# Patient Record
Sex: FEMALE | Race: WHITE | NOT HISPANIC OR LATINO | Employment: FULL TIME | URBAN - METROPOLITAN AREA
[De-identification: names, ages, dates, MRNs, and addresses within clinical notes are randomized per-mention and may not be internally consistent; named-entity substitution may affect disease eponyms.]

---

## 2017-01-17 ENCOUNTER — OFFICE VISIT (OUTPATIENT)
Dept: MEDICAL GROUP | Facility: MEDICAL CENTER | Age: 22
End: 2017-01-17
Payer: COMMERCIAL

## 2017-01-17 VITALS
DIASTOLIC BLOOD PRESSURE: 76 MMHG | SYSTOLIC BLOOD PRESSURE: 106 MMHG | OXYGEN SATURATION: 99 % | HEART RATE: 80 BPM | TEMPERATURE: 97.6 F | BODY MASS INDEX: 20.57 KG/M2 | HEIGHT: 66 IN | WEIGHT: 128 LBS

## 2017-01-17 DIAGNOSIS — Z30.09 COUNSELING FOR BIRTH CONTROL REGARDING INTRAUTERINE DEVICE (IUD): ICD-10-CM

## 2017-01-17 DIAGNOSIS — N92.0 MENORRHAGIA WITH REGULAR CYCLE: ICD-10-CM

## 2017-01-17 DIAGNOSIS — M25.562 ACUTE PAIN OF LEFT KNEE: ICD-10-CM

## 2017-01-17 DIAGNOSIS — G43.109 MIGRAINE WITH AURA AND WITHOUT STATUS MIGRAINOSUS, NOT INTRACTABLE: ICD-10-CM

## 2017-01-17 PROCEDURE — 99214 OFFICE O/P EST MOD 30 MIN: CPT | Performed by: NURSE PRACTITIONER

## 2017-01-17 RX ORDER — SUMATRIPTAN 50 MG/1
50 TABLET, FILM COATED ORAL
Qty: 9 TAB | Refills: 3
Start: 2017-01-17 | End: 2017-07-19

## 2017-01-17 NOTE — MR AVS SNAPSHOT
"        Kelvin Malagon   2017 11:20 AM   Office Visit   MRN: [id number]    Department:  [address]   Dept Phone:  [phone]    Description:  Female : 1995   Provider:  LARRY Weiner           Reason for Visit     Medication Problem med refilled at a higher dose and would like to talk about bc      Allergies as of 2017     Allergen Noted Reactions    Bactrim 2008       Egg White [Albumin] 2016       Unknown knows through allergy testing      You were diagnosed with     Migraine with aura and without status migrainosus, not intractable   [052797]       Acute pain of left knee   [6699533]       Menorrhagia with regular cycle   [842599]       Counseling for birth control regarding intrauterine device (IUD)   [3168837]         Vital Signs     Blood Pressure Pulse Temperature Height Weight Body Mass Index    106/76 mmHg 80 36.4 °C (97.6 °F) 1.676 m (5' 5.98\") 58.06 kg (128 lb) 20.67 kg/m2    Oxygen Saturation Last Menstrual Period Breastfeeding? Smoking Status          99% 2016 No Never Smoker         Basic Information     Date Of Birth Sex Race Ethnicity Preferred Language    1995 Female White Non- English      Problem List              ICD-10-CM Priority Class Noted - Resolved    Blood clotting disorder (CMS-HCC) D68.9   10/16/2013 - Present    Lactose intolerance E73.9   2014 - Present    Migraine without status migrainosus, not intractable G43.909   2016 - Present    Food allergy Z91.018   2016 - Present      Health Maintenance        Date Due Completion Dates    IMM HEP B VACCINE (1 of 3 - Primary Series) 1995 ---    IMM HEP A VACCINE (1 of 2 - Standard Series) 10/10/1996 ---    IMM VARICELLA (CHICKENPOX) VACCINE (1 of 2 - 2 Dose Adolescent Series) 10/10/2008 ---    IMM MENINGOCOCCAL VACCINE (MCV4) (1 of 1) 10/10/2011 ---    IMM INFLUENZA (1) 2016 ---    PAP SMEAR 10/10/2016 ---    IMM DTaP/Tdap/Td Vaccine (2 - Td) " 5/27/2018 5/27/2008            Current Immunizations     HPV Quadrivalent Vaccine (GARDASIL) 12/4/2008, 8/1/2008, 5/27/2008    Tdap Vaccine 5/27/2008      Below and/or attached are the medications your provider expects you to take. Review all of your home medications and newly ordered medications with your provider and/or pharmacist. Follow medication instructions as directed by your provider and/or pharmacist. Please keep your medication list with you and share with your provider. Update the information when medications are discontinued, doses are changed, or new medications (including over-the-counter products) are added; and carry medication information at all times in the event of emergency situations     Allergies:  BACTRIM - (reactions not documented)     EGG WHITE - (reactions not documented)               Medications  Valid as of: January 17, 2017 -  2:05 PM    Generic Name Brand Name Tablet Size Instructions for use    SUMAtriptan Succinate (Tab) IMITREX 50 MG Take 1 Tab by mouth Once PRN for Migraine for up to 1 dose.        Topiramate (Tab) TOPAMAX 50 MG Take 1 Tab by mouth 2 times a day.        .                 Medicines prescribed today were sent to:     [facility] [address] [place] [address] [place], [place] - [address] AT [facility]. & [place]    [address] [place] [address]    Phone: [phone] Fax: [phone]    Open 24 Hours?: No      Medication refill instructions:       If your prescription bottle indicates you have medication refills left, it is not necessary to call your provider’s office. Please contact your pharmacy and they will refill your medication.    If your prescription bottle indicates you do not have any refills left, you may request refills at any time through one of the following ways: The online leaselock system (except Urgent Care), by calling your provider’s office, or by asking your pharmacy to contact your provider’s office with a refill request. Medication  refills are processed only during regular business hours and may not be available until the next business day. Your provider may request additional information or to have a follow-up visit with you prior to refilling your medication.   *Please Note: Medication refills are assigned a new Rx number when refilled electronically. Your pharmacy may indicate that no refills were authorized even though a new prescription for the same medication is available at the pharmacy. Please request the medicine by name with the pharmacy before contacting your provider for a refill.        Referral     A referral request has been sent to our patient care coordination department. Please allow 3-5 business days for us to process this request and contact you either by phone or mail. If you do not hear from us by the 5th business day, please call us at [phone].           Ionix Medical Access Code: [email or web address]  Expires: 2/16/2017  2:05 PM    Ionix Medical  A secure, online tool to manage your health information     [employer]’s Ionix Medical® is a secure, online tool that connects you to your personalized health information from the privacy of your home -- day or night - making it very easy for you to manage your healthcare. Once the activation process is completed, you can even access your medical information using the Ionix Medical nga, which is available for free in the Apple Nga store or Google Play store.     Ionix Medical provides the following levels of access (as shown below):   My Chart Features   [facility] Doctor [facility]  Urgent  Care Non-[facility]  Primary Care  Doctor   Email your healthcare team securely and privately 24/7 X X X    Manage appointments: schedule your next appointment; view details of past/upcoming appointments X      Request prescription refills. X      View recent personal medical records, including lab and immunizations X X X X   View health record, including health history, allergies,  medications X X X X   Read reports about your outpatient visits, procedures, consult and ER notes X X X X   See your discharge summary, which is a recap of your hospital and/or ER visit that includes your diagnosis, lab results, and care plan. X X       How to register for StoredIQ:  1. Go to  [email or web address].  2. Click on the Sign Up Now box, which takes you to the New Member Sign Up page. You will need to provide the following information:  a. Enter your StoredIQ Access Code exactly as it appears at the top of this page. (You will not need to use this code after you’ve completed the sign-up process. If you do not sign up before the expiration date, you must request a new code.)   b. Enter your date of birth.   c. Enter your home email address.   d. Click Submit, and follow the next screen’s instructions.  3. Create a StoredIQ ID. This will be your StoredIQ login ID and cannot be changed, so think of one that is secure and easy to remember.  4. Create a StoredIQ password. You can change your password at any time.  5. Enter your Password Reset Question and Answer. This can be used at a later time if you forget your password.   6. Enter your e-mail address. This allows you to receive e-mail notifications when new information is available in StoredIQ.  7. Click Sign Up. You can now view your health information.    For assistance activating your StoredIQ account, call [phone]

## 2017-01-17 NOTE — PROGRESS NOTES
"Chief Complaint   Patient presents with   • Medication Problem     med refilled at a higher dose and would like to talk about bc       HPI  Laquita is a 21-year-old established female here to follow-up on a few things:  #1-migraines: Chronic issue. Doing really well with Topamax twice daily and Imitrex for abortive therapy. She states that she has rare migraines as long as she remembers to take her Imitrex. Denies any negative side effects of Topamax or Imitrex. She received a prescription for 100 mg Imitrex and states that she only needs to take 50 mg.  #2-contraception: She is requesting some form of contraception. She is sexually active. She also has very heavy menstrual periods that last up to 2 weeks and cause abdominal cramping - describes as severely painful. Her mother has a blood clotting disorder, we are not certain of exactly which one. The patient states that she has also tested positive for one form of the blood clotting disorder that her mother has.  #3-describes acute onset left knee pain for the past week. Has a history of meniscal tear to her right knee, followed by Dr. Rodrigez.  Denies any injuries or trauma. States her knee just started popping, appearing swollen and feeling weak. 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 She denies any other associated symptoms. She is wearing a knee brace which helps.      Past medical, surgical, family, and social history is reviewed and updated in Epic chart by me today.   Medications and allergies reviewed and updated in Epic chart by me today.     ROS:   As documented in history of present illness above    Exam:  Blood pressure 106/76, pulse 80, temperature 36.4 °C (97.6 °F), height 1.676 m (5' 5.98\"), weight 58.06 kg (128 lb), last menstrual period 12/24/2016, SpO2 99 %, not currently breastfeeding.  Constitutional: Alert, no distress, plus 3 vital signs  Skin:  Warm, dry, no rashes invisible areas  Eye: Equal, round and reactive, " conjunctiva clear  ENMT: Lips without lesions, good dentition, oropharynx clear    Respiratory: Unlabored respiration, lungs clear to auscultation, no wheezes, no rhonchi  Cardiovascular: Normal rate and rhythm  Musculoskeletal: She does have full range of motion to her left knee although it appears slightly swollen without any bruising. No locking or laxity appreciated. She has some tenderness to her patellar tendon area.  Psych: Alert, pleasant, well-groomed, normal affect    A/P:  1. Migraine with aura and without status migrainosus, not intractable  -Stable with daily Topamax for preventative therapy. Changed her Imitrex milligrams to 50 mg. Continue same.  - sumatriptan (IMITREX) 50 MG Tab; Take 1 Tab by mouth Once PRN for Migraine for up to 1 dose.  Dispense: 9 Tab; Refill: 3    2. Acute pain of left knee  -Discussed rest, ice, elevation and quadriceps as well as hamstring strengthening exercises. She'll continue to wear her brace. Encouraged her to make a appointment with Dr. Rodrigez within the next 2-3 weeks if her knee pains not improving.    3. Menorrhagia with regular cycle  -She was referred to gynecology for possible placement of a copper IUD because of her personal and family history of blood clotting disorder. She will check with her mom regarding exact diagnoses of particular blood clotting disorder.  - REFERRAL TO GYNECOLOGY    4. Counseling for birth control regarding intrauterine device (IUD)  - REFERRAL TO GYNECOLOGY

## 2017-01-24 ENCOUNTER — OFFICE VISIT (OUTPATIENT)
Dept: URGENT CARE | Facility: PHYSICIAN GROUP | Age: 22
End: 2017-01-24
Payer: COMMERCIAL

## 2017-01-24 VITALS
SYSTOLIC BLOOD PRESSURE: 110 MMHG | WEIGHT: 126 LBS | HEIGHT: 66 IN | TEMPERATURE: 97.9 F | DIASTOLIC BLOOD PRESSURE: 64 MMHG | RESPIRATION RATE: 16 BRPM | BODY MASS INDEX: 20.25 KG/M2 | HEART RATE: 80 BPM | OXYGEN SATURATION: 99 %

## 2017-01-24 DIAGNOSIS — M25.562 ACUTE PAIN OF LEFT KNEE: ICD-10-CM

## 2017-01-24 PROCEDURE — 99214 OFFICE O/P EST MOD 30 MIN: CPT | Performed by: PHYSICIAN ASSISTANT

## 2017-01-24 NOTE — MR AVS SNAPSHOT
"        Kelvin Malagon   2017 6:15 PM   Office Visit   MRN: [id number]    Department:  [facility]   Dept Phone:  [phone]    Description:  Female : 1995   Provider:  Alondra Sawyer PA-C           Reason for Visit     Knee Pain L knee pain, feels like its going to dislocate      Allergies as of 2017     Allergen Noted Reactions    Bactrim 2008       Egg White [Albumin] 2016       Unknown knows through allergy testing      Vital Signs     Blood Pressure Pulse Temperature Respirations Height Weight    110/64 mmHg 80 36.6 °C (97.9 °F) 16 1.676 m (5' 5.98\") 57.153 kg (126 lb)    Body Mass Index Oxygen Saturation Last Menstrual Period Smoking Status          20.35 kg/m2 99% 2016 Never Smoker         Basic Information     Date Of Birth Sex Race Ethnicity Preferred Language    1995 Female White Non- English      Problem List              ICD-10-CM Priority Class Noted - Resolved    Blood clotting disorder (CMS-HCC) D68.9   10/16/2013 - Present    Lactose intolerance E73.9   2014 - Present    Migraine without status migrainosus, not intractable G43.909   2016 - Present    Food allergy Z91.018   2016 - Present      Health Maintenance        Date Due Completion Dates    IMM HEP B VACCINE (1 of 3 - Primary Series) 1995 ---    IMM HEP A VACCINE (1 of 2 - Standard Series) 10/10/1996 ---    IMM VARICELLA (CHICKENPOX) VACCINE (1 of 2 - 2 Dose Adolescent Series) 10/10/2008 ---    IMM MENINGOCOCCAL VACCINE (MCV4) (1 of 1) 10/10/2011 ---    IMM INFLUENZA (1) 2016 ---    PAP SMEAR 10/10/2016 ---    IMM DTaP/Tdap/Td Vaccine (2 - Td) 2018            Current Immunizations     HPV Quadrivalent Vaccine (GARDASIL) 2008, 2008, 2008    Tdap Vaccine 2008      Below and/or attached are the medications your provider expects you to take. Review all of your home medications and newly ordered medications with your provider and/or " pharmacist. Follow medication instructions as directed by your provider and/or pharmacist. Please keep your medication list with you and share with your provider. Update the information when medications are discontinued, doses are changed, or new medications (including over-the-counter products) are added; and carry medication information at all times in the event of emergency situations     Allergies:  BACTRIM - (reactions not documented)     EGG WHITE - (reactions not documented)               Medications  Valid as of: January 24, 2017 -  7:00 PM    Generic Name Brand Name Tablet Size Instructions for use    SUMAtriptan Succinate (Tab) IMITREX 50 MG Take 1 Tab by mouth Once PRN for Migraine for up to 1 dose.        Topiramate (Tab) TOPAMAX 50 MG Take 1 Tab by mouth 2 times a day.        .                 Medicines prescribed today were sent to:     [facility] [address] [place] [address] [place], [place] - [address] AT [facility] & [place]    [address] [facility] [address]    Phone: [phone] Fax: [phone]    Open 24 Hours?: No      Medication refill instructions:       If your prescription bottle indicates you have medication refills left, it is not necessary to call your provider’s office. Please contact your pharmacy and they will refill your medication.    If your prescription bottle indicates you do not have any refills left, you may request refills at any time through one of the following ways: The online 5 examples system (except Urgent Care), by calling your provider’s office, or by asking your pharmacy to contact your provider’s office with a refill request. Medication refills are processed only during regular business hours and may not be available until the next business day. Your provider may request additional information or to have a follow-up visit with you prior to refilling your medication.   *Please Note: Medication refills are assigned a new Rx number when refilled electronically.  Your pharmacy may indicate that no refills were authorized even though a new prescription for the same medication is available at the pharmacy. Please request the medicine by name with the pharmacy before contacting your provider for a refill.           Evoz Access Code: [email or web address]  Expires: 2/16/2017  2:05 PM    Evoz  A secure, online tool to manage your health information     [employer]’s Evoz® is a secure, online tool that connects you to your personalized health information from the privacy of your home -- day or night - making it very easy for you to manage your healthcare. Once the activation process is completed, you can even access your medical information using the Evoz nga, which is available for free in the Apple Nga store or Google Play store.     Evoz provides the following levels of access (as shown below):   My Chart Features   [facility] Doctor [facility]  Specialists [facility]  Urgent  Care Non-[facility]  Primary Care  Doctor   Email your healthcare team securely and privately 24/7 X X X    Manage appointments: schedule your next appointment; view details of past/upcoming appointments X      Request prescription refills. X      View recent personal medical records, including lab and immunizations X X X X   View health record, including health history, allergies, medications X X X X   Read reports about your outpatient visits, procedures, consult and ER notes X X X X   See your discharge summary, which is a recap of your hospital and/or ER visit that includes your diagnosis, lab results, and care plan. X X       How to register for Evoz:  1. Go to  [email or web address].  2. Click on the Sign Up Now box, which takes you to the New Member Sign Up page. You will need to provide the following information:  a. Enter your Evoz Access Code exactly as it appears at the top of this page. (You will not need to use this code after you’ve completed the sign-up process. If you  do not sign up before the expiration date, you must request a new code.)   b. Enter your date of birth.   c. Enter your home email address.   d. Click Submit, and follow the next screen’s instructions.  3. Create a BioLeap ID. This will be your BioLeap login ID and cannot be changed, so think of one that is secure and easy to remember.  4. Create a BioLeap password. You can change your password at any time.  5. Enter your Password Reset Question and Answer. This can be used at a later time if you forget your password.   6. Enter your e-mail address. This allows you to receive e-mail notifications when new information is available in BioLeap.  7. Click Sign Up. You can now view your health information.    For assistance activating your BioLeap account, call [phone]

## 2017-01-25 ASSESSMENT — ENCOUNTER SYMPTOMS
MUSCULOSKELETAL NEGATIVE: 1
DIZZINESS: 0
FALLS: 0
NAUSEA: 0
BACK PAIN: 0
CHILLS: 0
FEVER: 0
DIARRHEA: 0
SHORTNESS OF BREATH: 0
VOMITING: 0
HEADACHES: 0
ABDOMINAL PAIN: 0
MYALGIAS: 0
NUMBNESS: 0

## 2017-01-25 NOTE — PROGRESS NOTES
"Subjective:      Kelvin Malagon is a 21 y.o. female who presents with Knee Pain            Knee Pain  The current episode started 1 to 4 weeks ago (10 days). The problem has been unchanged. Pertinent negatives include no abdominal pain, chest pain, chills, fever, headaches, myalgias, nausea, numbness or vomiting. The symptoms are aggravated by walking. She has tried NSAIDs for the symptoms. The treatment provided mild relief.   Pt reports knee pain that started about 10 days. Pt believes it is due to walking on all the snow on the ground. States she does not know when she injured the knee, but noticed it gradually getting more painful throughout the day. Since she first noticed the pain, she has started wearing a knee brace, which helps with the pain. She states it the knee feels unstable like it is going to give out if she doesn't wear the brace. States the pain is mostly localized to the medial and anterior aspect of the joint. Denies previous injuries or surgeries. Reports the pain at 6/10. She has been icing, elevating, and taking nsaids since the pain started, without much improvement. States she is already established with ortho for surgery she had on her right knee, and has an appointment with them next week.     Review of Systems   Constitutional: Negative for fever and chills.   Respiratory: Negative for shortness of breath.    Cardiovascular: Negative for chest pain.   Gastrointestinal: Negative for nausea, vomiting, abdominal pain and diarrhea.   Genitourinary: Negative.    Musculoskeletal: Negative.  Negative for myalgias, back pain, joint pain and falls.   Neurological: Negative for dizziness, numbness and headaches.          Objective:     /64 mmHg  Pulse 80  Temp(Src) 36.6 °C (97.9 °F)  Resp 16  Ht 1.676 m (5' 5.98\")  Wt 57.153 kg (126 lb)  BMI 20.35 kg/m2  SpO2 99%  LMP 12/24/2016     Physical Exam   Constitutional: She is oriented to person, place, and time. She appears well-developed " and well-nourished. No distress.   HENT:   Head: Normocephalic and atraumatic.   Eyes: Pupils are equal, round, and reactive to light.   Neck: Normal range of motion.   Cardiovascular: Normal rate, regular rhythm and normal heart sounds.    Pulmonary/Chest: Effort normal and breath sounds normal. No respiratory distress. She has no wheezes.   Musculoskeletal:        Left knee: She exhibits decreased range of motion and MCL laxity. She exhibits no swelling, no effusion, no ecchymosis, no erythema and no bony tenderness. Tenderness found. Medial joint line and MCL tenderness noted.   Unable to flex knee past 90 degrees.      Neurological: She is alert and oriented to person, place, and time.   Skin: Skin is warm and dry. She is not diaphoretic.   Psychiatric: She has a normal mood and affect. Her behavior is normal.   Nursing note and vitals reviewed.         PMH:  has a past medical history of Allergy; ASTHMA; and Clotting disorder (CMS-[facility]). She also has no past medical history of Diabetes.  MEDS:   Current outpatient prescriptions:   •  sumatriptan (IMITREX) 50 MG Tab, Take 1 Tab by mouth Once PRN for Migraine for up to 1 dose., Disp: 9 Tab, Rfl: 3  •  topiramate (TOPAMAX) 50 MG tablet, Take 1 Tab by mouth 2 times a day., Disp: 60 Tab, Rfl: 5  ALLERGIES:   Allergies   Allergen Reactions   • Bactrim    • Egg White [Albumin]      Unknown knows through allergy testing     SURGHX:   Past Surgical History   Procedure Laterality Date   • Knee arthroscopy  1/8/2009     Performed by SHAD CORDOBA at SURGERY [facility]   • Synovectomy  1/8/2009     Performed by SHAD CORDOBA at SURGERY [facility]     SOCHX:  reports that she has never smoked. She has never used smokeless tobacco. She reports that she does not drink alcohol or use illicit drugs.  FH: family history includes Non-contributory in her father and mother.       Assessment/Plan:     1. Acute pain of left knee  - Advised that an X-ray is not  indicated at this times  - Encouraged to continue RICE therapy  - Continue OTC Ibuprofen 400-600 mg q4-6 hours as needed for pain   - Take with food  - Follow up with ortho next week, advised she will most likely need an MRI to determine the cause of her symptoms

## 2017-05-28 ENCOUNTER — HOSPITAL ENCOUNTER (EMERGENCY)
Facility: MEDICAL CENTER | Age: 22
End: 2017-05-29
Attending: EMERGENCY MEDICINE
Payer: COMMERCIAL

## 2017-05-28 DIAGNOSIS — G43.011 INTRACTABLE MIGRAINE WITHOUT AURA AND WITH STATUS MIGRAINOSUS: ICD-10-CM

## 2017-05-28 DIAGNOSIS — R11.2 NAUSEA AND VOMITING, INTRACTABILITY OF VOMITING NOT SPECIFIED, UNSPECIFIED VOMITING TYPE: ICD-10-CM

## 2017-05-28 PROCEDURE — 94760 N-INVAS EAR/PLS OXIMETRY 1: CPT

## 2017-05-28 PROCEDURE — 99284 EMERGENCY DEPT VISIT MOD MDM: CPT

## 2017-05-28 RX ORDER — KETOROLAC TROMETHAMINE 30 MG/ML
30 INJECTION, SOLUTION INTRAMUSCULAR; INTRAVENOUS ONCE
Status: COMPLETED | OUTPATIENT
Start: 2017-05-29 | End: 2017-05-29

## 2017-05-28 RX ORDER — SODIUM CHLORIDE 9 MG/ML
1000 INJECTION, SOLUTION INTRAVENOUS ONCE
Status: COMPLETED | OUTPATIENT
Start: 2017-05-29 | End: 2017-05-29

## 2017-05-28 RX ORDER — DIPHENHYDRAMINE HYDROCHLORIDE 50 MG/ML
50 INJECTION INTRAMUSCULAR; INTRAVENOUS ONCE
Status: COMPLETED | OUTPATIENT
Start: 2017-05-29 | End: 2017-05-29

## 2017-05-28 RX ORDER — SODIUM CHLORIDE 9 MG/ML
INJECTION, SOLUTION INTRAVENOUS CONTINUOUS
Status: DISCONTINUED | OUTPATIENT
Start: 2017-05-29 | End: 2017-05-29 | Stop reason: HOSPADM

## 2017-05-28 RX ORDER — METOCLOPRAMIDE HYDROCHLORIDE 5 MG/ML
10 INJECTION INTRAMUSCULAR; INTRAVENOUS ONCE
Status: COMPLETED | OUTPATIENT
Start: 2017-05-29 | End: 2017-05-29

## 2017-05-28 ASSESSMENT — PAIN SCALES - GENERAL
PAINLEVEL_OUTOF10: 8
PAINLEVEL_OUTOF10: 6

## 2017-05-28 NOTE — ED AVS SNAPSHOT
Home Care Instructions                                                                                                                Kelvin Malagon   MRN: [id number]    Department:  [facility], Emergency Dept   Date of Visit:  5/28/2017            [facility], Emergency Dept    [address]    [place] [address]    Phone:  [phone]      You were seen by     Estefania Trevino D.O.      Your Diagnosis Was     Intractable migraine without aura and with status migrainosus     G43.011       These are the medications you received during your hospitalization from 05/28/2017 2244 to 05/29/2017 0219     Date/Time Order Dose Route Action    05/29/2017 0008 NS infusion 1,000 mL 1,000 mL Intravenous New Bag    05/29/2017 0118 NS infusion 1,000 mL Intravenous New Bag    05/29/2017 0017 ketorolac (TORADOL) injection 30 mg 30 mg Intravenous Given    05/29/2017 0020 metoclopramide (REGLAN) injection 10 mg 10 mg Intravenous Given    05/29/2017 0010 diphenhydrAMINE (BENADRYL) injection 50 mg 50 mg Intravenous Given    05/29/2017 0138 ketorolac (TORADOL) injection 30 mg 30 mg Intravenous Given      Follow-up Information     1. Follow up with LARRY Weiner. Schedule an appointment as soon as possible for a visit in 2 days.    Specialty:  Family Medicine    Why:  As needed, If symptoms worsen    Contact information    [address] [address]  [place] [address] [phone]        Medication Information     Review all of your home medications and newly ordered medications with your primary doctor and/or pharmacist as soon as possible. Follow medication instructions as directed by your doctor and/or pharmacist.     Please keep your complete medication list with you and share with your physician. Update the information when medications are discontinued, doses are changed, or new medications (including over-the-counter products) are added; and carry medication  information at all times in the event of emergency situations.               Medication List      START taking these medications        Instructions    Morning Afternoon Evening Bedtime    ondansetron 4 MG Tbdp   Commonly known as:  ZOFRAN ODT        Take 1 Tab by mouth every 8 hours as needed for Nausea/Vomiting.   Dose:  4 mg                          ASK your doctor about these medications        Instructions    Morning Afternoon Evening Bedtime    sumatriptan 50 MG Tabs   Commonly known as:  IMITREX        Take 1 Tab by mouth Once PRN for Migraine for up to 1 dose.   Dose:  50 mg                        topiramate 50 MG tablet   Commonly known as:  TOPAMAX        Take 1 Tab by mouth 2 times a day.   Dose:  50 mg                             Where to Get Your Medications      These medications were sent to [facility] [address]  [place] [address] [employer] [address] [facility] & [address]  [address] [place] [address]     Phone:  [phone]    - ondansetron 4 MG Tbdp            Procedures and tests performed during your visit     Cardiac Monitoring    IV Saline Lock    Pulse Ox        Discharge Instructions       Migraine Headache  A migraine headache is an intense, throbbing pain on one or both sides of your head. A migraine can last for 30 minutes to several hours.  CAUSES   The exact cause of a migraine headache is not always known. However, a migraine may be caused when nerves in the brain become irritated and release chemicals that cause inflammation. This causes pain.  Certain things may also trigger migraines, such as:  · Alcohol.  · Smoking.  · Stress.  · Menstruation.  · Aged cheeses.  · Foods or drinks that contain nitrates, glutamate, aspartame, or tyramine.  · Lack of sleep.  · Chocolate.  · Caffeine.  · Hunger.  · Physical exertion.  · Fatigue.  · Medicines used to treat chest pain (nitroglycerine), birth control pills, estrogen, and some blood pressure medicines.  SIGNS AND  SYMPTOMS  · Pain on one or both sides of your head.  · Pulsating or throbbing pain.  · Severe pain that prevents daily activities.  · Pain that is aggravated by any physical activity.  · Nausea, vomiting, or both.  · Dizziness.  · Pain with exposure to bright lights, loud noises, or activity.  · General sensitivity to bright lights, loud noises, or smells.  Before you get a migraine, you may get warning signs that a migraine is coming (aura). An aura may include:  · Seeing flashing lights.  · Seeing bright spots, halos, or zigzag lines.  · Having tunnel vision or blurred vision.  · Having feelings of numbness or tingling.  · Having trouble talking.  · Having muscle weakness.  DIAGNOSIS   A migraine headache is often diagnosed based on:  · Symptoms.  · Physical exam.  · A CT scan or MRI of your head. These imaging tests cannot diagnose migraines, but they can help rule out other causes of headaches.  TREATMENT  Medicines may be given for pain and nausea. Medicines can also be given to help prevent recurrent migraines.   HOME CARE INSTRUCTIONS  · Only take over-the-counter or prescription medicines for pain or discomfort as directed by your health care provider. The use of long-term narcotics is not recommended.  · Lie down in a dark, quiet room when you have a migraine.  · Keep a journal to find out what may trigger your migraine headaches. For example, write down:  ¨ What you eat and drink.  ¨ How much sleep you get.  ¨ Any change to your diet or medicines.  · Limit alcohol consumption.  · Quit smoking if you smoke.  · Get 7-9 hours of sleep, or as recommended by your health care provider.  · Limit stress.  · Keep lights dim if bright lights bother you and make your migraines worse.  SEEK IMMEDIATE MEDICAL CARE IF:   · Your migraine becomes severe.  · You have a fever.  · You have a stiff neck.  · You have vision loss.  · You have muscular weakness or loss of muscle control.  · You start losing your balance or have  trouble walking.  · You feel faint or pass out.  · You have severe symptoms that are different from your first symptoms.  MAKE SURE YOU:   · Understand these instructions.  · Will watch your condition.  · Will get help right away if you are not doing well or get worse.     This information is not intended to replace advice given to you by your health care provider. Make sure you discuss any questions you have with your health care provider.     Document Released: 12/18/2006 Document Revised: 01/08/2016 Document Reviewed: 08/25/2014  "[employer]" Interactive Patient Education ©2016 "[employer]" Inc.      Increase clear liquids for the next 24 hours  Take her current medications as directed and return if any problems or changes.          Patient Information     Patient Information    Following emergency treatment: all patient requiring follow-up care must return either to a private physician or a clinic if your condition worsens before you are able to obtain further medical attention, please return to the emergency room.     Billing Information    At [facility], we work to make the billing process streamlined for our patients.  Our Representatives are here to answer any questions you may have regarding your hospital bill.  If you have insurance coverage and have supplied your insurance information to us, we will submit a claim to your insurer on your behalf.  Should you have any questions regarding your bill, we can be reached online or by phone as follows:  Online: You are able pay your bills online or live chat with our representatives about any billing questions you may have. We are here to help Monday - Friday from 8:00am to 7:30pm and 9:00am - 12:00pm on Saturdays.  Please visit [email or web address]  for more information.   Phone:  [phone] or [phone]    Please note that your emergency physician, surgeon, pathologist, radiologist, anesthesiologist, and other specialists are  not employed by [facility] and will therefore bill separately for their services.  Please contact them directly for any questions concerning their bills at the numbers below:     Emergency Physician Services:  [phone]  [facility]:  [phone]  [facility]:  [phone]  [facility]:  [phone]    1. Your final bill may vary from the amount quoted upon discharge if all procedures are not complete at that time, or if your doctor has additional procedures of which we are not aware. You will receive an additional bill if you return to the Emergency Department at [facility] for suture removal regardless of the facility of which the sutures were placed.     2. Please arrange for settlement of this account at the emergency registration.    3. All self-pay accounts are due in full at the time of treatment.  If you are unable to meet this obligation then payment is expected within 4-5 days.     4. If you have had radiology studies (CT, X-ray, Ultrasound, MRI), you have received a preliminary result during your emergency department visit. Please contact the radiology department [phone] to receive a copy of your final result. Please discuss the Final result with your primary physician or with the follow up physician provided.     Crisis Hotline:  [place] Crisis Hotline:  6-202-RYCYGZR or 1-604.665.6608  [place] Crisis Hotline:    1-385.520.2512 or 977-532-5677         ED Discharge Follow Up Questions    1. In order to provide you with very good care, we would like to follow up with a phone call in the next few days.  May we have your permission to contact you?     YES /  NO    2. What is the best phone number to call you? (       )_____-__________    3. What is the best time to call you?      Morning  /  Afternoon  /  Evening                   Patient Signature:  ____________________________________________________________    Date:   ____________________________________________________________

## 2017-05-28 NOTE — ED AVS SNAPSHOT
Topix Access Code: [email or web address]  Expires: 6/28/2017  2:14 AM    Your email address is not on file at [facility].  Email Addresses are required for you to sign up for Topix, please contact [phone] to verify your personal information and to provide your email address prior to attempting to register for Topix.    Kelvin Malagon  [address] [place] [address]    Topix  A secure, online tool to manage your health information     [facility]’s Topix® is a secure, online tool that connects you to your personalized health information from the privacy of your home -- day or night - making it very easy for you to manage your healthcare. Once the activation process is completed, you can even access your medical information using the Topix nga, which is available for free in the Apple Nga store or Google Play store.     To learn more about Topix, visit [email or web address]    There are two levels of access available (as shown below):   My Chart Features  [facility] Primary Care Doctor [facility]  Specialists [facility]  Urgent  Care Non-[facility] Primary Care Doctor   Email your healthcare team securely and privately 24/7 X X X    Manage appointments: schedule your next appointment; view details of past/upcoming appointments X      Request prescription refills. X      View recent personal medical records, including lab and immunizations X X X X   View health record, including health history, allergies, medications X X X X   Read reports about your outpatient visits, procedures, consult and ER notes X X X X   See your discharge summary, which is a recap of your hospital and/or ER visit that includes your diagnosis, lab results, and care plan X X  X     How to register for CollabRxt:  Once your e-mail address has been verified, follow the following steps to sign up for Topix.     1. Go to  [email or web address]  2. Click on the Sign Up Now box, which takes you to the New Member Sign Up page. You will  need to provide the following information:  a. Enter your Onlineprinters Access Code exactly as it appears at the top of this page. (You will not need to use this code after you’ve completed the sign-up process. If you do not sign up before the expiration date, you must request a new code.)   b. Enter your date of birth.   c. Enter your home email address.   d. Click Submit, and follow the next screen’s instructions.  3. Create a Silicor Materialst ID. This will be your Onlineprinters login ID and cannot be changed, so think of one that is secure and easy to remember.  4. Create a Onlineprinters password. You can change your password at any time.  5. Enter your Password Reset Question and Answer. This can be used at a later time if you forget your password.   6. Enter your e-mail address. This allows you to receive e-mail notifications when new information is available in Onlineprinters.  7. Click Sign Up. You can now view your health information.    For assistance activating your Onlineprinters account, call [phone]

## 2017-05-28 NOTE — ED AVS SNAPSHOT
5/29/2017    Kelvin Malagon  [address] [facility] [address]    Dear Kelvin:    Critical access hospital wants to ensure your discharge home is safe and you or your loved ones have had all of your questions answered regarding your care after you leave the hospital.    Below is a list of resources and contact information should you have any questions regarding your hospital stay, follow-up instructions, or active medical symptoms.    Questions or Concerns Regarding… Contact   Medical Questions Related to Your Discharge  (7 days a week, 8am-5pm) Contact a Nurse Care Coordinator   [phone]   Medical Questions Not Related to Your Discharge  (24 hours a day / 7 days a week)  Contact the Nurse Health Line   [phone]    Medications or Discharge Instructions Refer to your discharge packet   or contact your [facility] Primary Care Provider   [phone]   Follow-up Appointment(s) Schedule your appointment via the grafter   or contact Scheduling [phone]   Billing Review your statement via the grafter  or contact Billing [phone]   Medical Records Review your records via the grafter   or contact Medical Records [phone]     You may receive a telephone call within two days of discharge. This call is to make certain you understand your discharge instructions and have the opportunity to have any questions answered. You can also easily access your medical information, test results and upcoming appointments via the the grafter free online health management tool. You can learn more and sign up at Discoverables/the grafter. For assistance setting up your the grafter account, please call [phone].    Once again, we want to ensure your discharge home is safe and that you have a clear understanding of any next steps in your care. If you have any questions or concerns, please do not hesitate to contact us, we are here for you. Thank you for choosing [facility] for your healthcare needs.    Sincerely,    Your [facility] Healthcare Team

## 2017-05-29 VITALS
WEIGHT: 134.7 LBS | DIASTOLIC BLOOD PRESSURE: 59 MMHG | TEMPERATURE: 99.1 F | OXYGEN SATURATION: 98 % | SYSTOLIC BLOOD PRESSURE: 112 MMHG | HEART RATE: 69 BPM | RESPIRATION RATE: 27 BRPM | BODY MASS INDEX: 21.65 KG/M2 | HEIGHT: 66 IN

## 2017-05-29 PROCEDURE — 96374 THER/PROPH/DIAG INJ IV PUSH: CPT

## 2017-05-29 PROCEDURE — 700105 HCHG RX REV CODE 258: Performed by: EMERGENCY MEDICINE

## 2017-05-29 PROCEDURE — 700111 HCHG RX REV CODE 636 W/ 250 OVERRIDE (IP): Performed by: EMERGENCY MEDICINE

## 2017-05-29 PROCEDURE — 96375 TX/PRO/DX INJ NEW DRUG ADDON: CPT

## 2017-05-29 PROCEDURE — 96361 HYDRATE IV INFUSION ADD-ON: CPT

## 2017-05-29 PROCEDURE — 96376 TX/PRO/DX INJ SAME DRUG ADON: CPT

## 2017-05-29 RX ORDER — ONDANSETRON 4 MG/1
4 TABLET, ORALLY DISINTEGRATING ORAL EVERY 8 HOURS PRN
Qty: 10 TAB | Refills: 0 | Status: SHIPPED | OUTPATIENT
Start: 2017-05-29 | End: 2018-05-26

## 2017-05-29 RX ORDER — KETOROLAC TROMETHAMINE 30 MG/ML
30 INJECTION, SOLUTION INTRAMUSCULAR; INTRAVENOUS ONCE
Status: COMPLETED | OUTPATIENT
Start: 2017-05-29 | End: 2017-05-29

## 2017-05-29 RX ADMIN — METOCLOPRAMIDE 10 MG: 5 INJECTION, SOLUTION INTRAMUSCULAR; INTRAVENOUS at 00:20

## 2017-05-29 RX ADMIN — SODIUM CHLORIDE 1000 ML: 9 INJECTION, SOLUTION INTRAVENOUS at 00:08

## 2017-05-29 RX ADMIN — KETOROLAC TROMETHAMINE 30 MG: 30 INJECTION, SOLUTION INTRAMUSCULAR at 01:38

## 2017-05-29 RX ADMIN — KETOROLAC TROMETHAMINE 30 MG: 30 INJECTION, SOLUTION INTRAMUSCULAR at 00:17

## 2017-05-29 RX ADMIN — DIPHENHYDRAMINE HYDROCHLORIDE 50 MG: 50 INJECTION, SOLUTION INTRAMUSCULAR; INTRAVENOUS at 00:10

## 2017-05-29 RX ADMIN — SODIUM CHLORIDE 1000 ML: 9 INJECTION, SOLUTION INTRAVENOUS at 01:18

## 2017-05-29 ASSESSMENT — PAIN SCALES - GENERAL: PAINLEVEL_OUTOF10: 1

## 2017-05-29 NOTE — ED NOTES
Pt bib family with c/o of migraine that started at 1930 Saturday. Pt reports taking her medication with no relief.

## 2017-05-29 NOTE — DISCHARGE INSTRUCTIONS
Migraine Headache  A migraine headache is an intense, throbbing pain on one or both sides of your head. A migraine can last for 30 minutes to several hours.  CAUSES   The exact cause of a migraine headache is not always known. However, a migraine may be caused when nerves in the brain become irritated and release chemicals that cause inflammation. This causes pain.  Certain things may also trigger migraines, such as:  · Alcohol.  · Smoking.  · Stress.  · Menstruation.  · Aged cheeses.  · Foods or drinks that contain nitrates, glutamate, aspartame, or tyramine.  · Lack of sleep.  · Chocolate.  · Caffeine.  · Hunger.  · Physical exertion.  · Fatigue.  · Medicines used to treat chest pain (nitroglycerine), birth control pills, estrogen, and some blood pressure medicines.  SIGNS AND SYMPTOMS  · Pain on one or both sides of your head.  · Pulsating or throbbing pain.  · Severe pain that prevents daily activities.  · Pain that is aggravated by any physical activity.  · Nausea, vomiting, or both.  · Dizziness.  · Pain with exposure to bright lights, loud noises, or activity.  · General sensitivity to bright lights, loud noises, or smells.  Before you get a migraine, you may get warning signs that a migraine is coming (aura). An aura may include:  · Seeing flashing lights.  · Seeing bright spots, halos, or zigzag lines.  · Having tunnel vision or blurred vision.  · Having feelings of numbness or tingling.  · Having trouble talking.  · Having muscle weakness.  DIAGNOSIS   A migraine headache is often diagnosed based on:  · Symptoms.  · Physical exam.  · A CT scan or MRI of your head. These imaging tests cannot diagnose migraines, but they can help rule out other causes of headaches.  TREATMENT  Medicines may be given for pain and nausea. Medicines can also be given to help prevent recurrent migraines.   HOME CARE INSTRUCTIONS  · Only take over-the-counter or prescription medicines for pain or discomfort as directed by your  health care provider. The use of long-term narcotics is not recommended.  · Lie down in a dark, quiet room when you have a migraine.  · Keep a journal to find out what may trigger your migraine headaches. For example, write down:  ¨ What you eat and drink.  ¨ How much sleep you get.  ¨ Any change to your diet or medicines.  · Limit alcohol consumption.  · Quit smoking if you smoke.  · Get 7-9 hours of sleep, or as recommended by your health care provider.  · Limit stress.  · Keep lights dim if bright lights bother you and make your migraines worse.  SEEK IMMEDIATE MEDICAL CARE IF:   · Your migraine becomes severe.  · You have a fever.  · You have a stiff neck.  · You have vision loss.  · You have muscular weakness or loss of muscle control.  · You start losing your balance or have trouble walking.  · You feel faint or pass out.  · You have severe symptoms that are different from your first symptoms.  MAKE SURE YOU:   · Understand these instructions.  · Will watch your condition.  · Will get help right away if you are not doing well or get worse.     This information is not intended to replace advice given to you by your health care provider. Make sure you discuss any questions you have with your health care provider.     Document Released: 12/18/2006 Document Revised: 01/08/2016 Document Reviewed: 08/25/2014  SwitchNote Interactive Patient Education ©2016 [employer].      Increase clear liquids for the next 24 hours  Take her current medications as directed and return if any problems or changes.

## 2017-05-29 NOTE — ED PROVIDER NOTES
ED Provider Note    CHIEF COMPLAINT  Chief Complaint   Patient presents with   • Migraine       HPI  Kelvin Malagon is a 21 y.o. female who presents tonight with her mother with a chief complaint of migraine headache that she has had for over 27 hours. She has tried taking her home medications of Topamax and Imitrex without success. She is now nauseated and vomiting. She denies any fever, chills, head trauma. She states this is typical of her migraines and just has lasted a lot longer. She states she normally can abort the headaches with her medications but it's not working this time. She denies any blurred or double vision.    REVIEW OF SYSTEMS  See HPI for further details. All other system reviews are negative.    PAST MEDICAL HISTORY  Past Medical History   Diagnosis Date   • Allergy    • ASTHMA      child -cifuentes.  controlled without meds since 6 yrs old   • Clotting disorder ([facility])      patient unsure of the specifics       FAMILY HISTORY  Family History   Problem Relation Age of Onset   • Non-contributory Mother    • Non-contributory Father        SOCIAL HISTORY  Social History     Social History   • Marital Status: Single     Spouse Name: N/A   • Number of Children: N/A   • Years of Education: N/A     Social History Main Topics   • Smoking status: Never Smoker    • Smokeless tobacco: Never Used   • Alcohol Use: No   • Drug Use: No   • Sexual Activity: No     Other Topics Concern   • None     Social History Narrative       SURGICAL HISTORY  Past Surgical History   Procedure Laterality Date   • Knee arthroscopy  1/8/2009     Performed by SHAD CORDOBA at SUR[facility]ERY [facility]   • Synovectomy  1/8/2009     Performed by SHAD CORDOBA at SURGERY [facility]       CURRENT MEDICATIONS  See nurses notes    ALLERGIES  Allergies   Allergen Reactions   • Bactrim    • Egg White [Albumin]      Unknown knows through allergy testing       PHYSICAL EXAM  VITAL SIGNS: /59 mmHg  Pulse 69  Temp(Src)  "37.3 °C (99.1 °F)  Resp 27  Ht 1.676 m (5' 6\")  Wt 61.1 kg (134 lb 11.2 oz)  BMI 21.75 kg/m2  SpO2 98%    Constitutional: Patient is well developed, well nourished in moderate distress from her headache.  HENT: Normocephalic, Oropharynx moist without erythema, nose normal with no drainage.   Eyes: PERRL, EOMI, Conjunctiva without erythema.   Neck: Supple with no anterior cervical adenopathy. Normal range of motion in flexion, extension and lateral rotation. No tenderness along the bony prominences or paraspinal muscles.  Lymphatic: No lymphadenopathy noted.   Cardiovascular: Normal heart rate and rhythm. No murmur.  Thorax & Lungs: Clear and equal breath sounds with good excursion. No respiratory distress, no wheezing.  Abdomen: Bowel sounds normal in all four quadrants. Soft,nontender, no rebound , guarding.  Skin: Pale warm, Dry, No rashes.   Back: No cervical, thoracic, or lumbosacral tenderness.   Extremities: Peripheral pulses 4/4 No edema, No tenderness  Musculoskeletal: Normal range of motion in all major joints. No tenderness to palpation.  Neurologic: Alert & oriented x 3, Normal motor function, Normal sensory function, No lateralizing or focal deficits noted. DTR's 4/4 bilaterally.  Psychiatric: Affect normal, Judgment normal, Mood normal.    COURSE & MEDICAL DECISION MAKING  Pertinent Labs & Imaging studies reviewed. (See chart for details)  Patient received an IV of normal saline with Toradol, Reglan, Benadryl and was about 80% improved. I gave her a redo dose of Toradol and she is feeling remarkably better. She states her pain level was down to a 1 out of 10. Plan will be to give her a prescription for Zofran oral dissolving tablets. She is to continue her current medications and follow up with her primary care doctor this week as needed. She can return if elevated fever, uncontrolled vomiting or worsening pain. She is stable upon discharge    FINAL IMPRESSION  1. Acute migraine cephalgia  2. " Nausea with vomiting  3.         Electronically signed by: Estefania Trevino, 5/29/2017 2:08 AM

## 2017-05-29 NOTE — ED NOTES
Pt given discharge instructions; verbalized understanding of instructions. Pt advised that a prescription was sent to her pharmacy.  Ambulated out with mother

## 2017-06-07 ENCOUNTER — OFFICE VISIT (OUTPATIENT)
Dept: MEDICAL GROUP | Facility: LAB | Age: 22
End: 2017-06-07
Payer: COMMERCIAL

## 2017-06-07 VITALS
RESPIRATION RATE: 12 BRPM | WEIGHT: 135 LBS | BODY MASS INDEX: 21.69 KG/M2 | TEMPERATURE: 98.5 F | HEIGHT: 66 IN | OXYGEN SATURATION: 98 % | HEART RATE: 82 BPM | DIASTOLIC BLOOD PRESSURE: 70 MMHG | SYSTOLIC BLOOD PRESSURE: 100 MMHG

## 2017-06-07 DIAGNOSIS — G43.109 MIGRAINE WITH AURA AND WITHOUT STATUS MIGRAINOSUS, NOT INTRACTABLE: ICD-10-CM

## 2017-06-07 DIAGNOSIS — Z91.09 ENVIRONMENTAL ALLERGIES: ICD-10-CM

## 2017-06-07 PROCEDURE — 99214 OFFICE O/P EST MOD 30 MIN: CPT | Performed by: NURSE PRACTITIONER

## 2017-06-07 RX ORDER — PROPRANOLOL HYDROCHLORIDE 20 MG/1
20 TABLET ORAL 2 TIMES DAILY
Qty: 60 TAB | Refills: 5 | Status: SHIPPED | OUTPATIENT
Start: 2017-06-07 | End: 2018-05-26

## 2017-06-07 NOTE — PROGRESS NOTES
"Chief Complaint   Patient presents with   • Migraine     F/V on migraine, pt states meds are not longer helping her, she had to go to ER  on 5/28, with acute migraine        HPI  Laquita is a 22 yo est female here with return of migraines, chronic issue to the since she was a child.  Started with increasing frequency, duration and severity of headaches 1.5 mo ago.  No new stressors - work / school same.  Sleeping well.  No falls or trauma.  Menses are monthly. Taking topiramate 50 mg bid which was previously working well to prevent migraines.  imitrex has stopped helping abort her migraines.  Had to go to ER 5/28 b/c of 28 hour migraine - went away with meds in ER and sent home with zofran which helps.  Generalized HA 4-5 x per week and migraine at least once weekly only on right side of head with nausea and photo/ phonophobia.    Environmental allergies: Chronic issue. Not currently well controlled. Requesting a prescription of Claritin D12 hour which has been helpful for her but she can only get a certain quantity at her pharmacy. Describes her allergy symptoms as congestion, runny nose, sneezing and itchy eyes. Occasional coughing. She does feel that her allergies worsen her migraines.    Past medical, surgical, family, and social history is reviewed and updated in Epic chart by me today.   Medications and allergies reviewed and updated in Epic chart by me today.     ROS:   As documented in history of present illness above    Exam:  Blood pressure 100/70, pulse 82, temperature 36.9 °C (98.5 °F), resp. rate 12, height 1.676 m (5' 5.98\"), weight 61.236 kg (135 lb), SpO2 98 %.  Constitutional: Alert, no distress, plus 3 vital signs  Skin:  Warm, dry, no rashes invisible areas  Eye: Equal, round and reactive, conjunctiva clear  ENMT: Lips without lesions, good dentition, oropharynx clear    Neck: Trachea midline, no masses, no thyromegaly  Respiratory: Unlabored respiration, lungs clear to auscultation, no wheezes, no " rhonchi  Cardiovascular: Normal rate and rhythm  Psych: Alert, pleasant, well-groomed, normal affect    A/P:  1. Migraine with aura and without status migrainosus, not intractable  -if imitrex does not work again, change to relpax or maxalt.   -Add on propanolol 20 mg twice daily. Continue Topamax. Increase water intake, sleep at night and exercise. Discussed certain migraine triggers with the patient. Follow-up 6 weeks with headache diary and discussed that we may potentially be able to reduce her propanolol, eventually getting rid of it with better control of her allergies.   - propranolol (INDERAL) 20 MG Tab; Take 1 Tab by mouth 2 times a day.  Dispense: 60 Tab; Refill: 5    2. Environmental allergies  - loratadine/pseudo SR (CLARITIN D) 5-120 MG TABLET SR 12 HR; Take 1 Tab by mouth 2 times a day.  Dispense: 60 Tab; Refill: 5    Side effects of all medications prescribed today were discussed with the patient including how to take the medications and proper dosage. Discussed repercussions of not taking the medications as prescribed. Instructed to call the office should she have any negative side effects or problems with the medications.

## 2017-06-07 NOTE — MR AVS SNAPSHOT
"        Kelvin Malagon   2017 9:20 AM   Office Visit   MRN: [id number]    Department:  [facility]   Dept Phone:  [phone]    Description:  Female : 1995   Provider:  LARRY Weiner           Reason for Visit     Migraine F/V on migraine, pt states meds are not longer helping her, she had to go to ER  on , with acute migraine       Allergies as of 2017     Allergen Noted Reactions    Bactrim 2008       Egg White [Albumin] 2016       Unknown knows through allergy testing      You were diagnosed with     Migraine with aura and without status migrainosus, not intractable   [324844]       Environmental allergies   [006683]         Vital Signs     Blood Pressure Pulse Temperature Respirations Height Weight    100/70 mmHg 82 36.9 °C (98.5 °F) 12 1.676 m (5' 5.98\") 61.236 kg (135 lb)    Body Mass Index Oxygen Saturation Smoking Status             21.80 kg/m2 98% Never Smoker          Basic Information     Date Of Birth Sex Race Ethnicity Preferred Language    1995 Female White Non- English      Your appointments     2017  9:00 AM   Established Patient with LARRY Weiner   [facility] (--)    [address] [place] [id number] [phone]           You will be receiving a confirmation call a few days before your appointment from our automated call confirmation system.              Problem List              ICD-10-CM Priority Class Noted - Resolved    Blood clotting disorder (CMS-HCC) D68.9   10/16/2013 - Present    Lactose intolerance E73.9   2014 - Present    Migraine without status migrainosus, not intractable G43.909   2016 - Present    Food allergy Z91.018   2016 - Present      Health Maintenance        Date Due Completion Dates    IMM HEP B VACCINE (1 of 3 - Primary Series) 1995 ---    IMM HEP A VACCINE (1 of 2 - Standard Series) 10/10/1996 ---    IMM VARICELLA " (CHICKENPOX) VACCINE (1 of 2 - 2 Dose Adolescent Series) 10/10/2008 ---    IMM MENINGOCOCCAL VACCINE (MCV4) (1 of 1) 10/10/2011 ---    PAP SMEAR 10/10/2016 ---    IMM DTaP/Tdap/Td Vaccine (2 - Td) 5/27/2018 5/27/2008            Current Immunizations     HPV Quadrivalent Vaccine (GARDASIL) 12/4/2008, 8/1/2008, 5/27/2008    Tdap Vaccine 5/27/2008      Below and/or attached are the medications your provider expects you to take. Review all of your home medications and newly ordered medications with your provider and/or pharmacist. Follow medication instructions as directed by your provider and/or pharmacist. Please keep your medication list with you and share with your provider. Update the information when medications are discontinued, doses are changed, or new medications (including over-the-counter products) are added; and carry medication information at all times in the event of emergency situations     Allergies:  BACTRIM - (reactions not documented)     EGG WHITE - (reactions not documented)               Medications  Valid as of: June 07, 2017 -  9:51 AM    Generic Name Brand Name Tablet Size Instructions for use    Loratadine-Pseudoephedrine (TABLET SR 12 HR) CLARITIN D 5-120 MG Take 1 Tab by mouth 2 times a day.        Ondansetron (TABLET DISPERSIBLE) ZOFRAN ODT 4 MG Take 1 Tab by mouth every 8 hours as needed for Nausea/Vomiting.        Propranolol HCl (Tab) INDERAL 20 MG Take 1 Tab by mouth 2 times a day.        SUMAtriptan Succinate (Tab) IMITREX 50 MG Take 1 Tab by mouth Once PRN for Migraine for up to 1 dose.        Topiramate (Tab) TOPAMAX 50 MG Take 1 Tab by mouth 2 times a day.        .                 Medicines prescribed today were sent to:     [id number] - [place] - [address] AT [facility] [address]    [address] [address]    Phone: [phone] Fax: [phone]    Open 24 Hours?: No    [id number] - [place] - [address] [facility]     [address] [facility] [address]    Phone: [phone] Fax: [phone]    Open 24 Hours?: No      Medication refill instructions:       If your prescription bottle indicates you have medication refills left, it is not necessary to call your provider’s office. Please contact your pharmacy and they will refill your medication.    If your prescription bottle indicates you do not have any refills left, you may request refills at any time through one of the following ways: The online [facility] [facility] [employer] system (except Urgent Care), by calling your provider’s office, or by asking your pharmacy to contact your provider’s office with a refill request. Medication refills are processed only during regular business hours and may not be available until the next business day. Your provider may request additional information or to have a follow-up visit with you prior to refilling your medication.   *Please Note: Medication refills are assigned a new Rx number when refilled electronically. Your pharmacy may indicate that no refills were authorized even though a new prescription for the same medication is available at the pharmacy. Please request the medicine by name with the pharmacy before contacting your provider for a refill.           [facility] [facility] [employer] Access Code: Activation code not generated  Current [facility] [facility] [employer] Status: Active

## 2017-06-18 ENCOUNTER — OFFICE VISIT (OUTPATIENT)
Dept: URGENT CARE | Facility: PHYSICIAN GROUP | Age: 22
End: 2017-06-18
Payer: COMMERCIAL

## 2017-06-18 ENCOUNTER — HOSPITAL ENCOUNTER (OUTPATIENT)
Dept: RADIOLOGY | Facility: MEDICAL CENTER | Age: 22
End: 2017-06-18
Attending: FAMILY MEDICINE
Payer: COMMERCIAL

## 2017-06-18 VITALS
RESPIRATION RATE: 14 BRPM | SYSTOLIC BLOOD PRESSURE: 118 MMHG | WEIGHT: 130 LBS | TEMPERATURE: 98.8 F | HEART RATE: 62 BPM | DIASTOLIC BLOOD PRESSURE: 82 MMHG | BODY MASS INDEX: 20.99 KG/M2 | OXYGEN SATURATION: 99 %

## 2017-06-18 DIAGNOSIS — M79.671 RIGHT FOOT PAIN: ICD-10-CM

## 2017-06-18 DIAGNOSIS — S90.454A: Primary | ICD-10-CM

## 2017-06-18 PROCEDURE — 73660 X-RAY EXAM OF TOE(S): CPT | Mod: RT

## 2017-06-18 PROCEDURE — 90715 TDAP VACCINE 7 YRS/> IM: CPT | Performed by: FAMILY MEDICINE

## 2017-06-18 PROCEDURE — 90471 IMMUNIZATION ADMIN: CPT | Performed by: FAMILY MEDICINE

## 2017-06-18 PROCEDURE — 10121 INC&RMVL FB SUBQ TISS COMP: CPT | Performed by: FAMILY MEDICINE

## 2017-06-18 PROCEDURE — L9900 O&P SUPPLY/ACCESSORY/SERVICE: HCPCS | Performed by: FAMILY MEDICINE

## 2017-06-18 RX ORDER — HYDROCODONE BITARTRATE AND ACETAMINOPHEN 7.5; 325 MG/1; MG/1
1 TABLET ORAL EVERY 8 HOURS PRN
Qty: 20 TAB | Refills: 0 | Status: SHIPPED | OUTPATIENT
Start: 2017-06-18 | End: 2018-05-26

## 2017-06-18 RX ORDER — CIPROFLOXACIN 500 MG/1
500 TABLET, FILM COATED ORAL 2 TIMES DAILY
Qty: 10 TAB | Refills: 0 | Status: SHIPPED | OUTPATIENT
Start: 2017-06-18 | End: 2017-06-23

## 2017-06-18 RX ORDER — IBUPROFEN 800 MG/1
800 TABLET ORAL EVERY 8 HOURS PRN
Qty: 20 TAB | Refills: 3 | Status: SHIPPED | OUTPATIENT
Start: 2017-06-18 | End: 2018-05-26

## 2017-06-18 ASSESSMENT — ENCOUNTER SYMPTOMS
DIZZINESS: 0
FEVER: 0
FOCAL WEAKNESS: 0
CHILLS: 0

## 2017-06-18 NOTE — PROGRESS NOTES
Subjective:      Kelvin Malagon is a 21 y.o. female who presents with Foot Pain    Chief Complaint   Patient presents with   • Foot Pain     kicked a shrub last night, right foot pain        - This is a very pleasant 21 y.o. female with complaints of Rt big toe wound/pain. Unsure last td.           ALLERGIES:  Bactrim and Egg white     PMH:  Past Medical History   Diagnosis Date   • Allergy    • ASTHMA      child -cifuentes.  controlled without meds since 6 yrs old   • Clotting disorder (CMS-HCC)      patient unsure of the specifics        MEDS:    Current outpatient prescriptions:   •  ciprofloxacin (CIPRO) 500 MG Tab, Take 1 Tab by mouth 2 times a day for 5 days., Disp: 10 Tab, Rfl: 0  •  ibuprofen (MOTRIN) 800 MG Tab, Take 1 Tab by mouth every 8 hours as needed., Disp: 20 Tab, Rfl: 3  •  hydrocodone-acetaminophen (NORCO) 7.5-325 MG per tablet, Take 1 Tab by mouth every 8 hours as needed., Disp: 20 Tab, Rfl: 0  •  loratadine/pseudo SR (CLARITIN D) 5-120 MG TABLET SR 12 HR, Take 1 Tab by mouth 2 times a day., Disp: 60 Tab, Rfl: 5  •  propranolol (INDERAL) 20 MG Tab, Take 1 Tab by mouth 2 times a day., Disp: 60 Tab, Rfl: 5  •  ondansetron (ZOFRAN ODT) 4 MG TABLET DISPERSIBLE, Take 1 Tab by mouth every 8 hours as needed for Nausea/Vomiting., Disp: 10 Tab, Rfl: 0  •  sumatriptan (IMITREX) 50 MG Tab, Take 1 Tab by mouth Once PRN for Migraine for up to 1 dose., Disp: 9 Tab, Rfl: 3  •  topiramate (TOPAMAX) 50 MG tablet, Take 1 Tab by mouth 2 times a day., Disp: 60 Tab, Rfl: 5    ** I have documented what I find to be significant in regards to past medical, social, family and surgical history  in my HPI or under PMH/PSH/FH review section, otherwise it is contributory **             HPI    Review of Systems   Constitutional: Negative for fever and chills.   Neurological: Negative for dizziness and focal weakness.          Objective:     /82 mmHg  Pulse 62  Temp(Src) 37.1 °C (98.8 °F)  Resp 14  Wt 58.968 kg (130 lb)   SpO2 99%     Physical Exam   Constitutional: She appears well-developed. No distress.   HENT:   Head: Normocephalic and atraumatic.   Eyes: Conjunctivae are normal.   Neck: Neck supple.   Cardiovascular: Regular rhythm.    No murmur heard.  Pulmonary/Chest: Effort normal. No respiratory distress.   Neurological: She is alert. She exhibits normal muscle tone.   Skin: Skin is warm and dry.   Psychiatric: She has a normal mood and affect. Judgment normal.   Nursing note and vitals reviewed.  Rt foot: some superficial abrasions over dorsal foot and great toe. Small PW great toe w/ ?FB. Full srom ankle/foot/toe.             Assessment/Plan:         1. Foreign body of skin of toe, right, initial encounter  ciprofloxacin (CIPRO) 500 MG Tab    ibuprofen (MOTRIN) 800 MG Tab    hydrocodone-acetaminophen (NORCO) 7.5-325 MG per tablet   2. Right foot pain  DX-TOE(S) 2+ RIGHT    TDAP VACCINE =>6YO IM     Xray: no acute findings by MY READ. RADIOLOGY READ PENDING.     * Rt big toe: 2cc lido w/o used for digital block, 0.3cc lido w/ used over PW site. #11 scalpel used to make 0.7cm incision through dermis down to sub-q to get better access to FB. FB removed w/ splinter forceps.     Wound cleaned/dressed  Post-op shoe  Rest/elevate        Dx & d/c instructions discussed w/ patient and/or family members. Follow up w/ Prvt Dr or here in 2 days if not getting better, sooner if needed,  ER if worse and UC/PCP unavailable.        Possible side effects (i.e. Rash, GI upset/constipation, sedation, elevation of BP or sugars) of any medications given discussed.

## 2017-06-18 NOTE — MR AVS SNAPSHOT
Kelvin Malagon   2017 9:15 AM   Office Visit   MRN: [id number]    Department:  [facility]   Dept Phone:  [phone]    Description:  Female : 1995   Provider:  Juan Carlos Irizarry M.D.           Reason for Visit     Foot Pain kicked a shrub last night, right foot pain      Allergies as of 2017     Allergen Noted Reactions    Bactrim 2008       Egg White [Albumin] 2016       Unknown knows through allergy testing      You were diagnosed with     Foreign body of skin of toe, right, initial encounter   [3312111]  -  Primary     Right foot pain   [[id number]]         Vital Signs     Blood Pressure Pulse Temperature Respirations Weight Oxygen Saturation    118/82 mmHg 62 37.1 °C (98.8 °F) 14 58.968 kg (130 lb) 99%    Smoking Status                   Never Smoker            Basic Information     Date Of Birth Sex Race Ethnicity Preferred Language    1995 Female White Non- English      Your appointments     2017  9:00 AM   Established Patient with LARRY Weiner   [facility] (--)    [address] [address] [id number] [phone]           You will be receiving a confirmation call a few days before your appointment from our automated call confirmation system.              Problem List              ICD-10-CM Priority Class Noted - Resolved    Blood clotting disorder (CMS-HCC) D68.9   10/16/2013 - Present    Lactose intolerance E73.9   2014 - Present    Migraine without status migrainosus, not intractable G43.909   2016 - Present    Food allergy Z91.018   2016 - Present      Health Maintenance        Date Due Completion Dates    IMM HEP B VACCINE (1 of 3 - Primary Series) 1995 ---    IMM HEP A VACCINE (1 of 2 - Standard Series) 10/10/1996 ---    IMM VARICELLA (CHICKENPOX) VACCINE (1 of 2 - 2 Dose Adolescent Series) 10/10/2008 ---    IMM MENINGOCOCCAL VACCINE (MCV4) (1 of 1) 10/10/2011 ---       PAP SMEAR 10/10/2016 ---    IMM DTaP/Tdap/Td Vaccine (2 - Td) 5/27/2018 5/27/2008            Current Immunizations     HPV Quadrivalent Vaccine (GARDASIL) 12/4/2008, 8/1/2008, 5/27/2008    Tdap Vaccine 6/18/2017, 5/27/2008      Below and/or attached are the medications your provider expects you to take. Review all of your home medications and newly ordered medications with your provider and/or pharmacist. Follow medication instructions as directed by your provider and/or pharmacist. Please keep your medication list with you and share with your provider. Update the information when medications are discontinued, doses are changed, or new medications (including over-the-counter products) are added; and carry medication information at all times in the event of emergency situations     Allergies:  BACTRIM - (reactions not documented)     EGG WHITE - (reactions not documented)               Medications  Valid as of: June 18, 2017 - 11:12 AM    Generic Name Brand Name Tablet Size Instructions for use    Ciprofloxacin HCl (Tab) CIPRO 500 MG Take 1 Tab by mouth 2 times a day for 5 days.        Hydrocodone-Acetaminophen (Tab) NORCO 7.5-325 MG Take 1 Tab by mouth every 8 hours as needed.        Ibuprofen (Tab) MOTRIN 800 MG Take 1 Tab by mouth every 8 hours as needed.        Loratadine-Pseudoephedrine (TABLET SR 12 HR) CLARITIN D 5-120 MG Take 1 Tab by mouth 2 times a day.        Mupirocin (Ointment) BACTROBAN 2 % Apply 1 Application to affected area(s) 2 times a day.        Ondansetron (TABLET DISPERSIBLE) ZOFRAN ODT 4 MG Take 1 Tab by mouth every 8 hours as needed for Nausea/Vomiting.        Propranolol HCl (Tab) INDERAL 20 MG Take 1 Tab by mouth 2 times a day.        SUMAtriptan Succinate (Tab) IMITREX 50 MG Take 1 Tab by mouth Once PRN for Migraine for up to 1 dose.        Topiramate (Tab) TOPAMAX 50 MG Take 1 Tab by mouth 2 times a day.        .                 Medicines prescribed today were sent to:     [id number] - [place] - [address] AT [facility] OF [address]    [address] [place] [address]    Phone: [phone] Fax: [phone]    Open 24 Hours?: No    [id number] - [place] - [address] [facility]    [address] [facility] [place] [address]    Phone: [phone] Fax: [phone]    Open 24 Hours?: No      Medication refill instructions:       If your prescription bottle indicates you have medication refills left, it is not necessary to call your provider’s office. Please contact your pharmacy and they will refill your medication.    If your prescription bottle indicates you do not have any refills left, you may request refills at any time through one of the following ways: The online [facility] system (except Urgent Care), by calling your provider’s office, or by asking your pharmacy to contact your provider’s office with a refill request. Medication refills are processed only during regular business hours and may not be available until the next business day. Your provider may request additional information or to have a follow-up visit with you prior to refilling your medication.   *Please Note: Medication refills are assigned a new Rx number when refilled electronically. Your pharmacy may indicate that no refills were authorized even though a new prescription for the same medication is available at the pharmacy. Please request the medicine by name with the pharmacy before contacting your provider for a refill.        Your To Do List     Future Labs/Procedures Complete By Expires    DX-TOE(S) 2+ RIGHT  As directed 6/18/2018         [facility] Access Code: Activation code not generated  Current [facility] Status: Active

## 2017-06-18 NOTE — Clinical Note
June 18, 2017         Patient: Kelvin Malagon   YOB: 1995   Date of Visit: 6/18/2017           To Whom it May Concern:    Kelvin Malagon was seen in my clinic on 6/18/2017. She may return to work today with use of post-op shoe due to toe injury. May use post-op shoe up to 1 week.     If you have any questions or concerns, please don't hesitate to call.        Sincerely,           Juan Carlos Irizarry M.D.  Electronically Signed

## 2017-07-19 ENCOUNTER — OFFICE VISIT (OUTPATIENT)
Dept: MEDICAL GROUP | Facility: LAB | Age: 22
End: 2017-07-19
Payer: COMMERCIAL

## 2017-07-19 VITALS
SYSTOLIC BLOOD PRESSURE: 92 MMHG | BODY MASS INDEX: 21.98 KG/M2 | TEMPERATURE: 98.8 F | RESPIRATION RATE: 12 BRPM | HEIGHT: 66 IN | WEIGHT: 136.8 LBS | DIASTOLIC BLOOD PRESSURE: 60 MMHG

## 2017-07-19 DIAGNOSIS — G43.109 MIGRAINE WITH AURA AND WITHOUT STATUS MIGRAINOSUS, NOT INTRACTABLE: ICD-10-CM

## 2017-07-19 PROCEDURE — 99213 OFFICE O/P EST LOW 20 MIN: CPT | Performed by: NURSE PRACTITIONER

## 2017-07-19 RX ORDER — ELETRIPTAN HYDROBROMIDE 20 MG/1
20 TABLET, FILM COATED ORAL
Qty: 10 TAB | Refills: 3 | Status: SHIPPED
Start: 2017-07-19 | End: 2018-01-28 | Stop reason: SDUPTHER

## 2017-07-19 NOTE — PROGRESS NOTES
"Chief Complaint   Patient presents with   • Migraine     F/V on migraines, pt states meds work some of the time        HPI\  Laquita is a 21-year-old established female here to follow-up on migraines:  #1-Migraine without status migrainosus, not intractable  Here to f/u on chronic migraines.  Has had migraines since 7 yrs old.  At our last visit propanolol was added on as preventative therapy but she feels has made improvement in terms of decreasing the frequency of her severe migraines. Denies any negative side effects of propanolol with the exception of occasional dizziness when she goes from sitting to standing. Has been charting migraine frequency - 6/18 she had a migraine that she describes as sharp knives in-between eyes / back of head (imitrex helped), 6/23/2017 (imitrex helped).  Went camping the last week of June and had a moderate headache every other day - only utilized motrin.  7/6/2017 - (imitrex helped after a few hours).  Reports mild headaches over the past week but not a migraine.   Taking propranolol 20 mg twice daily and Topamax 50 mg bid as preventative therapy.  Has not seen neurology since she was a child - interested in this.       Past medical, surgical, family, and social history is reviewed and updated in Epic chart by me today.   Medications and allergies reviewed and updated in Epic chart by me today.     ROS:   As documented in history of present illness above    Exam:  Blood pressure 92/60, temperature 37.1 °C (98.8 °F), resp. rate 12, height 1.676 m (5' 5.98\"), weight 62.052 kg (136 lb 12.8 oz).  Gen. appears healthy in no distress   Skin appropriate for sex and age   HEENT unremarkable   Lungs clear   Heart regular   Neuro gait and station normal   Psych appropriate      A/P:  1. Migraine with aura and without status migrainosus, not intractable  -Trial of changing her Imitrex to Relpax as it does appear that her abortive therapy is taking a bit longer. Refer to neurology but discussed " that this may take several months to be seen there. She'll continue propanolol and Topamax as she is tolerating these well. Discussed importance of her water intake, exercise and avoiding migraine triggers.  - REFERRAL TO NEUROLOGY  - eletriptan (RELPAX) 20 MG Tab; Take 1 Tab by mouth Once PRN for Migraine for up to 1 dose.  Dispense: 10 Tab; Refill: 3

## 2017-07-19 NOTE — ASSESSMENT & PLAN NOTE
Here to f/u on chronic migraines.  Has had migraines since 7 yrs old.  Has been charting migraine frequency - 6/18 in-between eyes / back of head (imitrex helped), 6/23/2017 (imitrex helped).  Went camping the last week of June and had a moderate headache every other day - only utilized motrin.  7/6/2017 - (imitrex helped after a few hours).  Reports mild headaches over the past week but not a migraine.    Taking propranolol 20 mg twice daily and Topamax 50 mg bid as preventative therapy.  Occasional associated dizziness.

## 2017-07-19 NOTE — MR AVS SNAPSHOT
"        Kelvin Malagon   2017 9:00 AM   Office Visit   MRN: [id number]    Department:  [facility]   Dept Phone:  [phone]    Description:  Female : 1995   Provider:  LARRY Weiner           Reason for Visit     Migraine F/V on migraines, pt states meds work some of the time       Allergies as of 2017     Allergen Noted Reactions    Bactrim 2008       Egg White [Albumin] 2016       Unknown knows through allergy testing      You were diagnosed with     Migraine with aura and without status migrainosus, not intractable   [[id number]]         Vital Signs     Blood Pressure Temperature Respirations Height Weight Body Mass Index    92/60 mmHg 37.1 °C (98.8 °F) 12 1.676 m (5' 5.98\") 62.052 kg (136 lb 12.8 oz) 22.09 kg/m2    Smoking Status                   Never Smoker            Basic Information     Date Of Birth Sex Race Ethnicity Preferred Language    1995 Female White Non- English      Problem List              ICD-10-CM Priority Class Noted - Resolved    Blood clotting disorder (CMS-HCC) D68.9   10/16/2013 - Present    Lactose intolerance E73.9   2014 - Present    Migraine without status migrainosus, not intractable G43.909   2016 - Present    Food allergy Z91.018   2016 - Present      Health Maintenance        Date Due Completion Dates    IMM HEP B VACCINE (1 of 3 - Primary Series) 1995 ---    IMM HEP A VACCINE (1 of 2 - Standard Series) 10/10/1996 ---    IMM VARICELLA (CHICKENPOX) VACCINE (1 of 2 - 2 Dose Adolescent Series) 10/10/2008 ---    IMM MENINGOCOCCAL VACCINE (MCV4) (1 of 1) 10/10/2011 ---    PAP SMEAR 10/10/2016 ---    IMM INFLUENZA (1) 2017 ---    IMM DTaP/Tdap/Td Vaccine (3 - Td) 2027, 2008            Current Immunizations     HPV Quadrivalent Vaccine (GARDASIL) 2008, 2008, 2008    Tdap Vaccine 2017, 2008      Below and/or attached are the medications your provider expects " you to take. Review all of your home medications and newly ordered medications with your provider and/or pharmacist. Follow medication instructions as directed by your provider and/or pharmacist. Please keep your medication list with you and share with your provider. Update the information when medications are discontinued, doses are changed, or new medications (including over-the-counter products) are added; and carry medication information at all times in the event of emergency situations     Allergies:  BACTRIM - (reactions not documented)     EGG WHITE - (reactions not documented)               Medications  Valid as of: July 19, 2017 -  9:26 AM    Generic Name Brand Name Tablet Size Instructions for use    Eletriptan Hydrobromide (Tab) RELPAX 20 MG Take 1 Tab by mouth Once PRN for Migraine for up to 1 dose.        Hydrocodone-Acetaminophen (Tab) NORCO 7.5-325 MG Take 1 Tab by mouth every 8 hours as needed.        Ibuprofen (Tab) MOTRIN 800 MG Take 1 Tab by mouth every 8 hours as needed.        Loratadine-Pseudoephedrine (TABLET SR 12 HR) CLARITIN D 5-120 MG Take 1 Tab by mouth 2 times a day.        Mupirocin (Ointment) BACTROBAN 2 % Apply 1 Application to affected area(s) 2 times a day.        Ondansetron (TABLET DISPERSIBLE) ZOFRAN ODT 4 MG Take 1 Tab by mouth every 8 hours as needed for Nausea/Vomiting.        Propranolol HCl (Tab) INDERAL 20 MG Take 1 Tab by mouth 2 times a day.        Topiramate (Tab) TOPAMAX 50 MG Take 1 Tab by mouth 2 times a day.        .                 Medicines prescribed today were sent to:     [facility] [address] [facility] [place] - [address] AT [facility] [address] [facility] [facility]    [address] [facility] [address]    Phone: [phone] Fax: [phone]    Open 24 Hours?: No    [id number] - [place], [place] - [address]    [address] [facility] [address]    Phone: [phone] Fax: [phone]    Open 24 Hours?: No      Medication refill  instructions:       If your prescription bottle indicates you have medication refills left, it is not necessary to call your provider’s office. Please contact your pharmacy and they will refill your medication.    If your prescription bottle indicates you do not have any refills left, you may request refills at any time through one of the following ways: The online echoecho system (except Urgent Care), by calling your provider’s office, or by asking your pharmacy to contact your provider’s office with a refill request. Medication refills are processed only during regular business hours and may not be available until the next business day. Your provider may request additional information or to have a follow-up visit with you prior to refilling your medication.   *Please Note: Medication refills are assigned a new Rx number when refilled electronically. Your pharmacy may indicate that no refills were authorized even though a new prescription for the same medication is available at the pharmacy. Please request the medicine by name with the pharmacy before contacting your provider for a refill.        Referral     A referral request has been sent to our patient care coordination department. Please allow 3-5 business days for us to process this request and contact you either by phone or mail. If you do not hear from us by the 5th business day, please call us at [phone].           echoecho Access Code: Activation code not generated  Current echoecho Status: Active

## 2017-09-01 DIAGNOSIS — G43.909 MIGRAINE WITHOUT STATUS MIGRAINOSUS, NOT INTRACTABLE, UNSPECIFIED MIGRAINE TYPE: ICD-10-CM

## 2017-09-01 RX ORDER — TOPIRAMATE 50 MG/1
TABLET, FILM COATED ORAL
Qty: 60 TAB | Refills: 0 | Status: SHIPPED | OUTPATIENT
Start: 2017-09-01 | End: 2017-09-24 | Stop reason: SDUPTHER

## 2017-09-01 NOTE — TELEPHONE ENCOUNTER
Was the patient seen in the last year in this department? Yes     Does patient have an active prescription for medications requested? No     Received Request Via: Pharmacy     Last visit:7/19/17

## 2017-11-16 ENCOUNTER — OFFICE VISIT (OUTPATIENT)
Dept: URGENT CARE | Facility: PHYSICIAN GROUP | Age: 22
End: 2017-11-16
Payer: COMMERCIAL

## 2017-11-16 VITALS
HEART RATE: 80 BPM | BODY MASS INDEX: 25.55 KG/M2 | DIASTOLIC BLOOD PRESSURE: 74 MMHG | WEIGHT: 159 LBS | HEIGHT: 66 IN | RESPIRATION RATE: 16 BRPM | OXYGEN SATURATION: 96 % | SYSTOLIC BLOOD PRESSURE: 112 MMHG | TEMPERATURE: 98.1 F

## 2017-11-16 DIAGNOSIS — M25.532 LEFT WRIST PAIN: ICD-10-CM

## 2017-11-16 PROCEDURE — 99213 OFFICE O/P EST LOW 20 MIN: CPT | Performed by: FAMILY MEDICINE

## 2017-11-16 ASSESSMENT — ENCOUNTER SYMPTOMS
WEIGHT LOSS: 0
ROS SKIN COMMENTS: NO ABRASION OR LACERATION

## 2017-11-16 NOTE — PROGRESS NOTES
"Subjective:      Kelvin Malagon is a 22 y.o. female who presents with Wrist Pain (left wrist pain, no injury x 3 days )            Recurrent problem. 4 days dorsal wrist pain suspects due to waxing and waning ganglion cyst. No new trauma. Pain is worse with extension. No fever. No muscle weakness or sensory loss.           Review of Systems   Constitutional: Negative for malaise/fatigue and weight loss.   Skin:        No abrasion or laceration       .  Medications, Allergies, and current problem list reviewed today in Epic       Objective:     /74   Pulse 80   Temp 36.7 °C (98.1 °F)   Resp 16   Ht 1.676 m (5' 6\")   Wt 72.1 kg (159 lb)   LMP 10/26/2017   SpO2 96%   BMI 25.66 kg/m²      Physical Exam   Constitutional: She appears well-developed and well-nourished. No distress.   HENT:   Head: Normocephalic and atraumatic.   Musculoskeletal:        Arms:  Neurological:   Speech is clear. Patient is appropriate and cooperative.                 Assessment/Plan:     1. Left wrist pain     Recurrent/chronic  History of ganglion cyst    Differential diagnosis, natural history, supportive care, and indications for immediate follow-up discussed at length.   Splint as needed  F/u ortho    "

## 2017-11-16 NOTE — LETTER
November 16, 2017         Patient: Kelvin Malagon   YOB: 1995   Date of Visit: 11/16/2017           To Whom it May Concern:    Kelvin Malagon was seen in my clinic on 11/16/2017. Please allow left wrist splint as needed through 2017 or until discontinued by orthopedic surgeon.       Sincerely,           Manny Bryson M.D.  Electronically Signed

## 2017-11-25 ENCOUNTER — HOSPITAL ENCOUNTER (EMERGENCY)
Facility: MEDICAL CENTER | Age: 22
End: 2017-11-26
Attending: EMERGENCY MEDICINE
Payer: COMMERCIAL

## 2017-11-25 DIAGNOSIS — R51.9 ACUTE INTRACTABLE HEADACHE, UNSPECIFIED HEADACHE TYPE: ICD-10-CM

## 2017-11-25 PROCEDURE — 99284 EMERGENCY DEPT VISIT MOD MDM: CPT

## 2017-11-25 ASSESSMENT — PAIN SCALES - GENERAL: PAINLEVEL_OUTOF10: 8

## 2017-11-26 VITALS
RESPIRATION RATE: 14 BRPM | BODY MASS INDEX: 24.7 KG/M2 | OXYGEN SATURATION: 98 % | HEART RATE: 77 BPM | SYSTOLIC BLOOD PRESSURE: 116 MMHG | DIASTOLIC BLOOD PRESSURE: 79 MMHG | HEIGHT: 66 IN | WEIGHT: 153.66 LBS | TEMPERATURE: 98 F

## 2017-11-26 PROCEDURE — 96374 THER/PROPH/DIAG INJ IV PUSH: CPT

## 2017-11-26 PROCEDURE — 700105 HCHG RX REV CODE 258: Performed by: EMERGENCY MEDICINE

## 2017-11-26 PROCEDURE — 99284 EMERGENCY DEPT VISIT MOD MDM: CPT

## 2017-11-26 PROCEDURE — 700111 HCHG RX REV CODE 636 W/ 250 OVERRIDE (IP): Performed by: EMERGENCY MEDICINE

## 2017-11-26 PROCEDURE — 96375 TX/PRO/DX INJ NEW DRUG ADDON: CPT

## 2017-11-26 RX ORDER — DIPHENHYDRAMINE HYDROCHLORIDE 50 MG/ML
25 INJECTION INTRAMUSCULAR; INTRAVENOUS ONCE
Status: COMPLETED | OUTPATIENT
Start: 2017-11-26 | End: 2017-11-26

## 2017-11-26 RX ORDER — KETOROLAC TROMETHAMINE 30 MG/ML
30 INJECTION, SOLUTION INTRAMUSCULAR; INTRAVENOUS ONCE
Status: COMPLETED | OUTPATIENT
Start: 2017-11-26 | End: 2017-11-26

## 2017-11-26 RX ORDER — SODIUM CHLORIDE 9 MG/ML
1000 INJECTION, SOLUTION INTRAVENOUS ONCE
Status: COMPLETED | OUTPATIENT
Start: 2017-11-26 | End: 2017-11-26

## 2017-11-26 RX ORDER — METOCLOPRAMIDE HYDROCHLORIDE 5 MG/ML
10 INJECTION INTRAMUSCULAR; INTRAVENOUS ONCE
Status: COMPLETED | OUTPATIENT
Start: 2017-11-26 | End: 2017-11-26

## 2017-11-26 RX ADMIN — DIPHENHYDRAMINE HYDROCHLORIDE 25 MG: 50 INJECTION, SOLUTION INTRAMUSCULAR; INTRAVENOUS at 00:38

## 2017-11-26 RX ADMIN — SODIUM CHLORIDE 1000 ML: 9 INJECTION, SOLUTION INTRAVENOUS at 00:37

## 2017-11-26 RX ADMIN — KETOROLAC TROMETHAMINE 30 MG: 30 INJECTION, SOLUTION INTRAMUSCULAR at 00:37

## 2017-11-26 RX ADMIN — METOCLOPRAMIDE 10 MG: 5 INJECTION, SOLUTION INTRAMUSCULAR; INTRAVENOUS at 00:38

## 2017-11-26 ASSESSMENT — PAIN SCALES - GENERAL
PAINLEVEL_OUTOF10: 2
PAINLEVEL_OUTOF10: 8

## 2017-11-26 NOTE — ED PROVIDER NOTES
ED Provider Note    CHIEF COMPLAINT  Chief Complaint   Patient presents with   • Headache       HPI  Kelvin Malagon is a 22 y.o. female who presentsComplaining of headache. She had taken Relpax at the onset and tried to sleep in the past that has aborted the headache. She does state this is same type of headache although worse than some. She was seen in the emergency room 6 months ago with the similar presentation at which time she was given IV fluids and several IV medications that are resolved the headache. There are no other unusual symptoms    REVIEW OF SYSTEMS  See HPI for further details.     PAST MEDICAL HISTORY  Past Medical History:   Diagnosis Date   • Allergy    • ASTHMA     child -cifuentes.  controlled without meds since 6 yrs old   • Clotting disorder (CMS-[facility])     patient unsure of the specifics       FAMILY HISTORY  Family History   Problem Relation Age of Onset   • Non-contributory Mother    • Non-contributory Father        SOCIAL HISTORY  Social History     Social History   • Marital status: Single     Spouse name: N/A   • Number of children: N/A   • Years of education: N/A     Social History Main Topics   • Smoking status: Never Smoker   • Smokeless tobacco: Never Used   • Alcohol use No   • Drug use: No   • Sexual activity: No     Other Topics Concern   • Not on file     Social History Narrative   • No narrative on file       SURGICAL HISTORY  Past Surgical History:   Procedure Laterality Date   • KNEE ARTHROSCOPY  1/8/2009    Performed by SHAD CORDOBA at SURGERY [facility]S   • SYNOVECTOMY  1/8/2009    [facility]       CURRENT MEDICATIONS  Home Medications    **Home medications have not yet been reviewed for this encounter**         ALLERGIES  Allergies   Allergen Reactions   • Bactrim    • Egg White [Albumin]      Unknown knows through allergy testing       PHYSICAL EXAM  VITAL SIGNS: /79   Pulse 84   Temp 36.7 °C (98 °F)   Resp 16   " Ht 1.676 m (5' 6\")   Wt 69.7 kg (153 lb 10.6 oz)   SpO2 99%   BMI 24.80 kg/m²      Constitutional: Well developed, Well nourished,Mild distress, Non-toxic appearance.   HENT: Normocephalic, Atraumatic, Bilateral external ears normal, mucous membranes moist, No oral exudates, Nose normal.   Eyes: PERRLA,  Conjunctiva and lids normal, No discharge.      Neck supple  Abdomen: Bowel sounds normal, Soft, No tenderness, No masses, No pulsatile masses. No guarding.  Skin: Warm, Dry, No erythema, No rash.   Back: No tenderness, No CVA tenderness.  Extremities: Intact distal pulses, No edema, No tenderness, No cyanosis, No clubbing.   Musculoskeletal: Good range of motion in all major joints. No tenderness to palpation or major deformities, or injuries noted.   Neurologic: Alert & oriented x 3, Normal motor function, Normal sensory function, No focal deficits noted.            COURSE & MEDICAL DECISION MAKING  Pertinent Labs & Imaging studies reviewed. (See chart for details)  We'll give IV fluids and Reglan and Benadryl Toradol which is what she received a few months with a similar presentation in which resolved the pain. Patient was feeling remarkably better after the medications and will be discharged home to continue her home medications and follow up with her primary care doctor this week for recheck. Return if any elevated fever or worsening symptoms. She is stable and improved.    FINAL IMPRESSION  1. Acute migraine headache  2.   3.       Electronically signed by: Everett Mondragon, 11/26/2017 12:06 AM    Patient's care was transferred to me at shift change pending clinical improvement. She has been rechecked and is resting comfortably with her headache resolved. Her vital signs are stable and she will be discharged in stable and improved condition.  Estefania Trevino DO  1:47 am  "

## 2017-11-26 NOTE — ED NOTES
Patient stated she feels much better. Patient provided printed discharge instructions which included signs and symptoms to look out for, why to return to ER, and other follow up appointments to make. Patient stated they had no further questions or concerns at this time. Patient discharged to home with mother. Patient ambulated out of ER with stable gait.

## 2017-11-26 NOTE — ED NOTES
Patient felt anxious after Reglan given, but symptoms subsided with in 5 mins. Mother stated she felt like this last time she had the medication.

## 2017-11-26 NOTE — DISCHARGE INSTRUCTIONS
General Headache Without Cause  A general headache is pain or discomfort felt around the head or neck area. The cause may not be found.   HOME CARE   · Keep all doctor visits.  · Only take medicines as told by your doctor.  · Lie down in a dark, quiet room when you have a headache.  · Keep a journal to find out if certain things bring on headaches. For example, write down:  ¨ What you eat and drink.  ¨ How much sleep you get.  ¨ Any change to your diet or medicines.  · Relax by getting a massage or doing other relaxing activities.  · Put ice or heat packs on the head and neck area as told by your doctor.  · Lessen stress.  · Sit up straight. Do not tighten (tense) your muscles.  · Quit smoking if you smoke.  · Lessen how much alcohol you drink.  · Lessen how much caffeine you drink, or stop drinking caffeine.  · Eat and sleep on a regular schedule.  · Get 7 to 9 hours of sleep, or as told by your doctor.  · Keep lights dim if bright lights bother you or make your headaches worse.  GET HELP RIGHT AWAY IF:   · Your headache becomes really bad.  · You have a fever.  · You have a stiff neck.  · You have trouble seeing.  · Your muscles are weak, or you lose muscle control.  · You lose your balance or have trouble walking.  · You feel like you will pass out (faint), or you pass out.  · You have really bad symptoms that are different than your first symptoms.  · You have problems with the medicines given to you by your doctor.  · Your medicines do not work.  · Your headache feels different than the other headaches.  · You feel sick to your stomach (nauseous) or throw up (vomit).     This information is not intended to replace advice given to you by your health care provider. Make sure you discuss any questions you have with your health care provider.     Document Released: 09/26/2009 Document Revised: 05/03/2016 Document Reviewed: 12/07/2012  Elsevier Interactive Patient Education ©2016 [employer]

## 2017-11-26 NOTE — ED NOTES
Patient has headache and nausea since yesterday, feels like headache she has head before. Patient tried her medications but did not help.

## 2017-11-26 NOTE — ED NOTES
Head ache for approx 36 hours, usually can take her relapax and it gets better after sleeping, woke up today with headache still present.

## 2017-11-28 ENCOUNTER — SUPERVISING PHYSICIAN REVIEW (OUTPATIENT)
Dept: URGENT CARE | Facility: CLINIC | Age: 22
End: 2017-11-28

## 2017-11-28 ENCOUNTER — APPOINTMENT (OUTPATIENT)
Dept: RADIOLOGY | Facility: IMAGING CENTER | Age: 22
End: 2017-11-28
Attending: PHYSICIAN ASSISTANT
Payer: COMMERCIAL

## 2017-11-28 ENCOUNTER — OFFICE VISIT (OUTPATIENT)
Dept: URGENT CARE | Facility: CLINIC | Age: 22
End: 2017-11-28
Payer: COMMERCIAL

## 2017-11-28 VITALS
WEIGHT: 152 LBS | RESPIRATION RATE: 20 BRPM | TEMPERATURE: 97.6 F | BODY MASS INDEX: 24.43 KG/M2 | OXYGEN SATURATION: 99 % | HEIGHT: 66 IN | DIASTOLIC BLOOD PRESSURE: 80 MMHG | SYSTOLIC BLOOD PRESSURE: 120 MMHG | HEART RATE: 78 BPM

## 2017-11-28 DIAGNOSIS — M25.532 PAIN AND SWELLING OF LEFT WRIST: ICD-10-CM

## 2017-11-28 DIAGNOSIS — M25.532 PAIN AND SWELLING OF LEFT WRIST: Primary | ICD-10-CM

## 2017-11-28 DIAGNOSIS — M25.432 PAIN AND SWELLING OF LEFT WRIST: Primary | ICD-10-CM

## 2017-11-28 DIAGNOSIS — M25.432 PAIN AND SWELLING OF LEFT WRIST: ICD-10-CM

## 2017-11-28 PROCEDURE — 73110 X-RAY EXAM OF WRIST: CPT | Mod: TC,LT | Performed by: PHYSICIAN ASSISTANT

## 2017-11-28 PROCEDURE — 99213 OFFICE O/P EST LOW 20 MIN: CPT | Performed by: PHYSICIAN ASSISTANT

## 2017-11-28 NOTE — PATIENT INSTRUCTIONS
RICE for Routine Care of Injuries  The routine care of many injuries includes rest, ice, compression, and elevation (RICE). The RICE strategy is often recommended for injuries to soft tissues, such as a muscle strain, ligament injuries, bruises, and overuse injuries. It can also be used for some bony injuries. Using the RICE strategy can help to relieve pain, lessen swelling, and enable your body to heal.  Rest  Rest is required to allow your body to heal. This usually involves reducing your normal activities and avoiding use of the injured part of your body. Generally, you can return to your normal activities when you are comfortable and have been given permission by your health care provider.  Ice  Icing your injury helps to keep the swelling down, and it lessens pain. Do not apply ice directly to your skin.  · Put ice in a plastic bag.  · Place a towel between your skin and the bag.  · Leave the ice on for 20 minutes, 2-3 times a day.  Do this for as long as you are directed by your health care provider.  Compression  Compression means putting pressure on the injured area. Compression helps to keep swelling down, gives support, and helps with discomfort. Compression may be done with an elastic bandage. If an elastic bandage has been applied, follow these general tips:  · Remove and reapply the bandage every 3-4 hours or as directed by your health care provider.  · Make sure the bandage is not wrapped too tightly, because this can cut off circulation. If part of your body beyond the bandage becomes blue, numb, cold, swollen, or more painful, your bandage is most likely too tight. If this occurs, remove your bandage and reapply it more loosely.  · See your health care provider if the bandage seems to be making your problems worse rather than better.  Elevation  Elevation means keeping the injured area raised. This helps to lessen swelling and decrease pain. If possible, your injured area should be elevated at or  above the level of your heart or the center of your chest.  WHEN SHOULD I SEEK MEDICAL CARE?  You should seek medical care if:  · Your pain and swelling continue.  · Your symptoms are getting worse rather than improving.  These symptoms may indicate that further evaluation or further X-rays are needed. Sometimes, X-rays may not show a small broken bone (fracture) until a number of days later. Make a follow-up appointment with your health care provider.  WHEN SHOULD I SEEK IMMEDIATE MEDICAL CARE?  You should seek immediate medical care if:  · You have sudden severe pain at or below the area of your injury.  · You have redness or increased swelling around your injury.  · You have tingling or numbness at or below the area of your injury that does not improve after you remove the elastic bandage.     This information is not intended to replace advice given to you by your health care provider. Make sure you discuss any questions you have with your health care provider.     Document Released: 04/01/2002 Document Revised: 12/23/2014 Document Reviewed: 11/25/2015  [employer] Interactive Patient Education ©2016 Elsevier Inc.

## 2017-11-28 NOTE — PROGRESS NOTES
Subjective:      Pt is a 22 y.o. female who presents with Wrist Pain (Couple wks left wrist pain .)            HPI  Pt notes 2 weeks of new mass/swelling and worsening 36 hrs of left wrist swelling with a lateral mass next to the ulna with no known trauma or injury. Pt has not taken any Rx medications for this condition. Pt states the pain is a 3-4/10, aching in nature and worse at night. Pt denies CP, SOB, NVD, paresthesias, headaches, dizziness, change in vision, hives, or other joint pain. The pt's medication list, problem list, and allergies have been evaluated and reviewed during today's visit.    PMH:  Past Medical History:   Diagnosis Date   • Allergy    • ASTHMA     child -cifuentes.  controlled without meds since 6 yrs old   • Clotting disorder (CMS-[facility])     patient unsure of the specifics       PSH:  Past Surgical History:   Procedure Laterality Date   • KNEE ARTHROSCOPY  1/8/2009    Performed by SHAD CORDOBA at SURGERY [facility]   • SYNOVECTOMY  1/8/2009    Performed by SHAD CORDOBA at SURGERY [facility] ORS       Fam Hx:    family history includes Non-contributory in her father and mother.  Family Status   Relation Status   • Mother Alive   • Father Alive   • Sister Alive   • Brother Alive   • Maternal Grandmother Alive       Soc HX:  Social History     Social History   • Marital status: Single     Spouse name: N/A   • Number of children: N/A   • Years of education: N/A     Occupational History   • Not on file.     Social History Main Topics   • Smoking status: Never Smoker   • Smokeless tobacco: Never Used   • Alcohol use No   • Drug use: No   • Sexual activity: No     Other Topics Concern   • Not on file     Social History Narrative   • No narrative on file         Medications:    Current Outpatient Prescriptions:   •  topiramate (TOPAMAX) 50 MG tablet, TAKE 1 TABLET BY MOUTH TWICE DAILY, Disp: 60 Tab, Rfl: 5  •  eletriptan (RELPAX) 20 MG Tab, Take 1 Tab by mouth Once PRN for Migraine  "for up to 1 dose., Disp: 10 Tab, Rfl: 3  •  ibuprofen (MOTRIN) 800 MG Tab, Take 1 Tab by mouth every 8 hours as needed., Disp: 20 Tab, Rfl: 3  •  propranolol (INDERAL) 20 MG Tab, Take 1 Tab by mouth 2 times a day., Disp: 60 Tab, Rfl: 5  •  hydrocodone-acetaminophen (NORCO) 7.5-325 MG per tablet, Take 1 Tab by mouth every 8 hours as needed., Disp: 20 Tab, Rfl: 0  •  mupirocin (BACTROBAN) 2 % Ointment, Apply 1 Application to affected area(s) 2 times a day., Disp: 15 g, Rfl: 1  •  loratadine/pseudo SR (CLARITIN D) 5-120 MG TABLET SR 12 HR, Take 1 Tab by mouth 2 times a day., Disp: 60 Tab, Rfl: 5  •  ondansetron (ZOFRAN ODT) 4 MG TABLET DISPERSIBLE, Take 1 Tab by mouth every 8 hours as needed for Nausea/Vomiting., Disp: 10 Tab, Rfl: 0      Allergies:  Bactrim and Egg white [albumin]    ROS  Constitutional: Negative for fever, chills and malaise/fatigue.   HENT: Negative for congestion and sore throat.    Eyes: Negative for blurred vision, double vision and photophobia.   Respiratory: Negative for cough and shortness of breath.    Cardiovascular: Negative for chest pain and palpitations.   Gastrointestinal: Negative for heartburn, nausea, vomiting, abdominal pain, diarrhea and constipation.   Genitourinary: Negative for dysuria and flank pain.   Musculoskeletal: POS for left wrist joint pain and myalgias.   Skin: Negative for itching and rash.   Neurological: Negative for dizziness, tingling and headaches.   Endo/Heme/Allergies: Does not bruise/bleed easily.   Psychiatric/Behavioral: Negative for depression. The patient is not nervous/anxious.           Objective:     /80   Pulse 78   Temp 36.4 °C (97.6 °F)   Resp 20   Ht 1.676 m (5' 6\")   Wt 68.9 kg (152 lb)   SpO2 99%   BMI 24.53 kg/m²      Physical Exam   Musculoskeletal:        Left wrist: She exhibits tenderness, bony tenderness, swelling and deformity. She exhibits normal range of motion, no effusion, no crepitus and no laceration.        Arms:        " Constitutional: PT is oriented to person, place, and time. PT appears well-developed and well-nourished. No distress.   HENT:   Head: Normocephalic and atraumatic.   Mouth/Throat: Oropharynx is clear and moist. No oropharyngeal exudate.   Eyes: Conjunctivae normal and EOM are normal. Pupils are equal, round, and reactive to light.   Neck: Normal range of motion. Neck supple. No thyromegaly present.   Cardiovascular: Normal rate, regular rhythm, normal heart sounds and intact distal pulses.  Exam reveals no gallop and no friction rub.    No murmur heard.  Pulmonary/Chest: Effort normal and breath sounds normal. No respiratory distress. PT has no wheezes. PT has no rales. Pt exhibits no tenderness.   Abdominal: Soft. Bowel sounds are normal. PT exhibits no distension and no mass. There is no tenderness. There is no rebound and no guarding.   Neurological: PT is alert and oriented to person, place, and time. PT has normal reflexes. No cranial nerve deficit.   Skin: Skin is warm and dry. No rash noted. PT is not diaphoretic. No erythema.       Psychiatric: PT has a normal mood and affect. PT behavior is normal. Judgment and thought content normal.     Rads:  Narrative     11/28/2017 12:38 PM    HISTORY/REASON FOR EXAM:  Atraumatic Pain/Swelling/Deformity      TECHNIQUE/EXAM DESCRIPTION AND NUMBER OF VIEWS:  4 views of the LEFT wrist.    COMPARISON:  Plain films of the left hand 12/14/2013    FINDINGS:  There is no evidence of fracture or dislocation. Alignment is maintained.  No osseous erosion is identified.   Impression     No evidence of acute fracture or dislocation.      Reading Provider Reading Date   Sena Russell M.D. Nov 28, 2017      Signing Provider Signing Date Signing Time   Sena Russell M.D. Nov 28, 2017 12:50 PM          Assessment/Plan:     1. Pain and swelling of left wrist    - DX-WRIST-COMPLETE 3+ LEFT; Future    Likely hematoma from contusion to that area but pt wishes to see  specialist  Referral sports medicine  RICE therapy discussed  Gentle ROM exercises discussed  WBAT left wrist  Ice/heat therapy discussed  NSAIDs for pain control  Continue left wrist splint (pt's own)  Rest, fluids encouraged.  AVS with medical info given.  Pt was in full understanding and agreement with the plan.  Follow-up as needed if symptoms worsen or fail to improve.

## 2018-01-28 DIAGNOSIS — G43.109 MIGRAINE WITH AURA AND WITHOUT STATUS MIGRAINOSUS, NOT INTRACTABLE: ICD-10-CM

## 2018-01-29 RX ORDER — ELETRIPTAN HYDROBROMIDE 20 MG/1
TABLET, FILM COATED ORAL
Qty: 10 TAB | Refills: 3 | Status: SHIPPED | OUTPATIENT
Start: 2018-01-29 | End: 2018-05-26

## 2018-05-26 ENCOUNTER — OFFICE VISIT (OUTPATIENT)
Dept: URGENT CARE | Facility: PHYSICIAN GROUP | Age: 23
End: 2018-05-26
Payer: COMMERCIAL

## 2018-05-26 VITALS
HEART RATE: 83 BPM | HEIGHT: 66 IN | OXYGEN SATURATION: 97 % | BODY MASS INDEX: 27.61 KG/M2 | DIASTOLIC BLOOD PRESSURE: 70 MMHG | WEIGHT: 171.8 LBS | SYSTOLIC BLOOD PRESSURE: 120 MMHG | TEMPERATURE: 98.5 F

## 2018-05-26 DIAGNOSIS — H66.001 ACUTE SUPPURATIVE OTITIS MEDIA OF RIGHT EAR WITHOUT SPONTANEOUS RUPTURE OF TYMPANIC MEMBRANE, RECURRENCE NOT SPECIFIED: ICD-10-CM

## 2018-05-26 PROCEDURE — 99214 OFFICE O/P EST MOD 30 MIN: CPT | Performed by: PHYSICIAN ASSISTANT

## 2018-05-26 RX ORDER — AMOXICILLIN AND CLAVULANATE POTASSIUM 875; 125 MG/1; MG/1
1 TABLET, FILM COATED ORAL 2 TIMES DAILY
Qty: 20 TAB | Refills: 0 | Status: SHIPPED | OUTPATIENT
Start: 2018-05-26 | End: 2018-06-11

## 2018-05-26 RX ORDER — ACETAMINOPHEN 500 MG
500-1000 TABLET ORAL EVERY 6 HOURS PRN
COMMUNITY
End: 2018-06-11

## 2018-05-26 RX ORDER — IBUPROFEN 800 MG/1
800 TABLET ORAL EVERY 8 HOURS PRN
Qty: 30 TAB | Refills: 0 | Status: SHIPPED | OUTPATIENT
Start: 2018-05-26 | End: 2019-01-24

## 2018-05-26 NOTE — LETTER
May 26, 2018         Patient: Kelvin Malagon   YOB: 1995   Date of Visit: 5/26/2018           To Whom it May Concern:    Kelvin Malagon was seen in my clinic on 5/26/2018. She is to be off work tomorrow to recover.     If you have any questions or concerns, please don't hesitate to call.        Sincerely,           Joana Voss P.A.-C.  Electronically Signed

## 2018-05-26 NOTE — PROGRESS NOTES
Chief Complaint   Patient presents with   • Earache     poss ear infection, bilateral ear pain, mostly R ear, feels like under water, x3 days        HISTORY OF PRESENT ILLNESS: Patient is a 22 y.o. female who presents today for 3 days of worsening right ear pressure, fullness, hearing decrease and pain.   She notes that the pain was worse waking up this morning and has decreased slightly.  She notes some sore throat and sensation of swollen glands on the right.  Some slight coughing that makes her throat hurt. Has had tactile fevers and chills.   She usually takes Sudafed and this helps however this time it has not      Patient Active Problem List    Diagnosis Date Noted   • Food allergy 09/22/2016   • Migraine without status migrainosus, not intractable 08/25/2016   • Lactose intolerance 07/25/2014   • Blood clotting disorder (HCC) 10/16/2013       Allergies:Bactrim and Egg white [albumin]    Current Outpatient Prescriptions Ordered in [facility]   Medication Sig Dispense Refill   • Pseudoephedrine-APAP-DM (DAYQUIL PO) Take  by mouth.     • acetaminophen (TYLENOL) 500 MG Tab Take 500-1,000 mg by mouth every 6 hours as needed.     • amoxicillin-clavulanate (AUGMENTIN) 875-125 MG Tab Take 1 Tab by mouth 2 times a day. 20 Tab 0   • ibuprofen (MOTRIN) 800 MG Tab Take 1 Tab by mouth every 8 hours as needed. 30 Tab 0     No current Epic-ordered facility-administered medications on file.        Past Medical History:   Diagnosis Date   • Allergy    • ASTHMA     child -cifuentes.  controlled without meds since 6 yrs old   • Clotting disorder (HCC)     patient unsure of the specifics       Social History   Substance Use Topics   • Smoking status: Never Smoker   • Smokeless tobacco: Never Used   • Alcohol use Yes      Comment: Occ       Family Status   Relation Status   • Mother Alive   • Father Alive   • Sister Alive   • Brother Alive   • Maternal Grandmother Alive     Family History   Problem Relation Age of Onset   • Non-contributory  "Mother    • Non-contributory Father        ROS:  Review of Systems   Constitutional: SEE HPI  HENT: SEE HPI  Eyes: Negative for blurred vision.   Respiratory: Negative for cough, sputum production, shortness of breath and wheezing.    Cardiovascular: Negative for chest pain, palpitations, orthopnea and leg swelling.   Gastrointestinal: Negative for heartburn, nausea, vomiting and abdominal pain.       Exam:  Blood pressure 120/70, pulse 83, temperature 36.9 °C (98.5 °F), height 1.676 m (5' 6\"), weight 77.9 kg (171 lb 12.8 oz), SpO2 97 %.  General:  Well nourished, well developed female in NAD  Eyes: PERRLA, EOM within normal limits, no conjunctival injection, no scleral icterus, visual fields and acuity grossly intact.  Ears: Normal shape and symmetry, no tenderness, no discharge. External canals are without any significant edema or erythema. TM right bulging, erythematous suppurative effusion.  Left WNL. Gross auditory acuity is intact.  No mastoid tenderness, no periauricular lymphadenopathy or tenderness  Nose: Symmetrical, sinuses without tenderness, clear rhinorrhea.    Mouth: reasonable hygiene, no erythema exudates or tonsillar enlargement.  Neck: no masses, range of motion within normal limits, no tracheal deviation. No lymphadenopathy  Pulmonary: Normal respiratory effort, no wheezes, crackles, or rhonchi.  Cardiovascular: regular rate and rhythm without murmurs, rubs, or gallops.  Skin: No visible rashes or lesion. Warm, pink, dry.   Extremities: no clubbing, cyanosis, or edema.  Neuro: A&O x 3. Speech normal/clear.  Normal gait.         Assessment/Plan:  1. Acute suppurative otitis media of right ear without spontaneous rupture of tympanic membrane, recurrence not specified  amoxicillin-clavulanate (AUGMENTIN) 875-125 MG Tab    ibuprofen (MOTRIN) 800 MG Tab      -consistent with bacterial otitis media.   -fluids emphasized. Alternating Tylenol/Motrin prn pain/inflammation/fever  -ok to continue Sudafed " prn.   -RTC precautions discussed such as worsening  despite abx, worsening fevers.       Supportive care, differential diagnoses, and indications for immediate follow-up discussed with patient.   Pathogenesis of diagnosis discussed including typical length and natural progression.   Instructed to return to clinic or nearest emergency department for any change in condition, further concerns, or worsening of symptoms.  Patient states understanding of the plan of care and discharge instructions.        Joana Voss P.A.-C.

## 2018-06-04 ENCOUNTER — OFFICE VISIT (OUTPATIENT)
Dept: URGENT CARE | Facility: PHYSICIAN GROUP | Age: 23
End: 2018-06-04
Payer: COMMERCIAL

## 2018-06-04 VITALS
BODY MASS INDEX: 27.8 KG/M2 | OXYGEN SATURATION: 97 % | SYSTOLIC BLOOD PRESSURE: 120 MMHG | RESPIRATION RATE: 12 BRPM | TEMPERATURE: 98.1 F | HEART RATE: 76 BPM | DIASTOLIC BLOOD PRESSURE: 68 MMHG | WEIGHT: 173 LBS | HEIGHT: 66 IN

## 2018-06-04 DIAGNOSIS — L50.9 URTICARIA: ICD-10-CM

## 2018-06-04 PROCEDURE — 99213 OFFICE O/P EST LOW 20 MIN: CPT | Performed by: EMERGENCY MEDICINE

## 2018-06-04 RX ORDER — EPINEPHRINE 0.3 MG/.3ML
0.3 INJECTION SUBCUTANEOUS ONCE
Qty: 0.3 ML | Refills: 0 | Status: SHIPPED | OUTPATIENT
Start: 2018-06-04 | End: 2018-06-04

## 2018-06-04 RX ORDER — METHYLPREDNISOLONE 4 MG/1
4 TABLET ORAL DAILY
Qty: 1 KIT | Refills: 0 | Status: SHIPPED | OUTPATIENT
Start: 2018-06-04 | End: 2018-06-11

## 2018-06-04 RX ORDER — HYDROXYZINE PAMOATE 25 MG/1
25 CAPSULE ORAL 3 TIMES DAILY PRN
Qty: 15 CAP | Refills: 0 | Status: SHIPPED | OUTPATIENT
Start: 2018-06-04 | End: 2018-06-11

## 2018-06-04 ASSESSMENT — PAIN SCALES - GENERAL: PAINLEVEL: NO PAIN

## 2018-06-04 NOTE — LETTER
June 4, 2018        Kelvin Malagon  [address] [facility] [address]        Dear Kelvin:    Please ask for the next 1-2 days off from work for medical reasons.    If you have any questions or concerns, please don't hesitate to call.        Sincerely,        Jose Montez M.D.    Electronically Signed

## 2018-06-04 NOTE — PROGRESS NOTES
Subjective:      Kelvin Malagon is a 22 y.o. female who presents with Rash (Rashes/itchiness on ribs, legs, arms,and inner thighs x1day )            HPI  Pt with a diffuse puritic rash to her groin, upper arms for the past few hours, has been on Amoxicillin for the past 9 days for BOM. Patient no longer having symptoms of otitis media has no stridor no shortness of breath is having diffuse rash localized to her upper extremities.    Allergies   Allergen Reactions   • Bactrim    • Egg White [Albumin]      Unknown knows through allergy testing   • Pcn [Penicillins]      Urticarial rash     Social History     Social History   • Marital status: Single     Spouse name: N/A   • Number of children: N/A   • Years of education: N/A     Occupational History   • Not on file.     Social History Main Topics   • Smoking status: Never Smoker   • Smokeless tobacco: Never Used   • Alcohol use Yes      Comment: Occ   • Drug use: No   • Sexual activity: No     Other Topics Concern   • Not on file     Social History Narrative   • No narrative on file     Past Medical History:   Diagnosis Date   • Allergy    • ASTHMA     child -cifuentes.  controlled without meds since 6 yrs old   • Clotting disorder (HCC)     patient unsure of the specifics     Current Outpatient Prescriptions on File Prior to Visit   Medication Sig Dispense Refill   • Pseudoephedrine-APAP-DM (DAYQUIL PO) Take  by mouth.     • acetaminophen (TYLENOL) 500 MG Tab Take 500-1,000 mg by mouth every 6 hours as needed.     • amoxicillin-clavulanate (AUGMENTIN) 875-125 MG Tab Take 1 Tab by mouth 2 times a day. 20 Tab 0   • ibuprofen (MOTRIN) 800 MG Tab Take 1 Tab by mouth every 8 hours as needed. 30 Tab 0     No current facility-administered medications on file prior to visit.      Family History   Problem Relation Age of Onset   • Non-contributory Mother    • Non-contributory Father      Review of Systems   Constitutional: Negative for fever.   HENT: Positive for ear pain. Negative  "for congestion and sinus pain.    Eyes: Negative for discharge and redness.   Respiratory: Negative for shortness of breath.    Cardiovascular: Negative for chest pain.   Gastrointestinal: Negative for abdominal pain, diarrhea, nausea and vomiting.   Musculoskeletal: Negative for neck pain.   Skin: Positive for itching and rash.   Neurological: Negative for sensory change.          Objective:     /68   Pulse 76   Temp 36.7 °C (98.1 °F)   Resp 12   Ht 1.676 m (5' 6\")   Wt 78.5 kg (173 lb)   LMP 05/21/2018   SpO2 97%   Breastfeeding? No   BMI 27.92 kg/m²      Physical Exam   Constitutional: She appears well-developed and well-nourished. No distress.       HENT:   Head: Normocephalic and atraumatic.   Right Ear: External ear normal.   Bilateral fluid behind both eardrums no inflammation or erythema.   Eyes: Conjunctivae are normal. Left eye exhibits no discharge.   Cardiovascular: Normal rate.    No murmur heard.  Pulmonary/Chest: No stridor. No respiratory distress. She has no wheezes.   Abdominal: She exhibits no distension. There is no tenderness.   Musculoskeletal: Normal range of motion.   Skin: Skin is warm and dry. There is erythema.   She has diffuse bilateral symmetric rash localized to her upper half of each extremity. See diagram.   Psychiatric: She has a normal mood and affect.               Assessment/Plan:     1. Urticaria    2. Serous otitis     Patient will be placed on Medrol Dosepak as well as hydroxyzine for pruritus. She's been given an EpiPen to have in case of recurrence she's aware if she uses she needs to notify 911 emergently.    I am recommending the patient initiate/ continue hydration efforts including the use of a vaporizer/humidifier/ netti pot. I also recommend the pt, initiate Mucinex and Sudafed or Dayquil... If the patient's condition exacerbates with worsening dysphagia, shortness of breath, uncontrolled fever, headache or chest pressure he/she will return immediately " to the urgent care or go to  the emergency department for further evaluation.    Jose Montez    - MethylPREDNISolone (MEDROL DOSEPAK) 4 MG Tablet Therapy Pack; Take 1 Tab by mouth every day.  Dispense: 1 Kit; Refill: 0  - hydrOXYzine pamoate (VISTARIL) 25 MG Cap; Take 1 Cap by mouth 3 times a day as needed for Itching.  Dispense: 15 Cap; Refill: 0  - EPINEPHrine (EPIPEN 2-ESTEBAN) 0.3 MG/0.3ML Solution Auto-injector solution for injection; 0.3 mL by Intramuscular route Once for 1 dose.  Dispense: 0.3 mL; Refill: 0

## 2018-06-05 ASSESSMENT — ENCOUNTER SYMPTOMS
SHORTNESS OF BREATH: 0
EYE REDNESS: 0
DIARRHEA: 0
FEVER: 0
ABDOMINAL PAIN: 0
NAUSEA: 0
SINUS PAIN: 0
SENSORY CHANGE: 0
EYE DISCHARGE: 0
VOMITING: 0
NECK PAIN: 0

## 2018-06-11 ENCOUNTER — OFFICE VISIT (OUTPATIENT)
Dept: MEDICAL GROUP | Facility: LAB | Age: 23
End: 2018-06-11
Payer: COMMERCIAL

## 2018-06-11 VITALS
TEMPERATURE: 99.5 F | HEART RATE: 96 BPM | BODY MASS INDEX: 27.8 KG/M2 | DIASTOLIC BLOOD PRESSURE: 62 MMHG | RESPIRATION RATE: 12 BRPM | WEIGHT: 173 LBS | HEIGHT: 66 IN | SYSTOLIC BLOOD PRESSURE: 110 MMHG | OXYGEN SATURATION: 98 %

## 2018-06-11 DIAGNOSIS — H66.001 ACUTE SUPPURATIVE OTITIS MEDIA OF RIGHT EAR WITHOUT SPONTANEOUS RUPTURE OF TYMPANIC MEMBRANE, RECURRENCE NOT SPECIFIED: ICD-10-CM

## 2018-06-11 PROCEDURE — 99214 OFFICE O/P EST MOD 30 MIN: CPT | Performed by: NURSE PRACTITIONER

## 2018-06-11 RX ORDER — CLINDAMYCIN HYDROCHLORIDE 300 MG/1
300 CAPSULE ORAL 3 TIMES DAILY
Qty: 21 CAP | Refills: 0 | Status: SHIPPED | OUTPATIENT
Start: 2018-06-11 | End: 2018-06-18

## 2018-06-11 ASSESSMENT — PATIENT HEALTH QUESTIONNAIRE - PHQ9: CLINICAL INTERPRETATION OF PHQ2 SCORE: 0

## 2018-06-11 NOTE — PROGRESS NOTES
"Chief Complaint   Patient presents with   • Otalgia     pt went to  & was given meds for ear infection, she has allergic reaction to meds, both ears still have pain        HPI  Laquita is a 21 yo est female here with c/o 2.5 weeks of bilateral ear pain, congestion and not feeling well - new issue to me today.  She presented to urgent care approximately 3 days after her symptoms started and was told that she had an ear infection.  Given augmentin and rx motrin - had rash all over by day 9 - returned to  and was placed on medrol pack with hydroxyzine at night - rash resolved.  Now feeling worn down and still has pain to left ear.  Taking mucinex D without improvement.  No other associated symptoms such as shortness of breath, wheezing, fevers or chills.  Caregiver for elderly patients.    Nonsmoker.        Past medical, surgical, family, and social history is reviewed and updated in Epic chart by me today.   Medications and allergies reviewed and updated in Epic chart by me today.     ROS:   As documented in history of present illness above    Exam:  Blood pressure 110/62, pulse 96, temperature 37.5 °C (99.5 °F), resp. rate 12, height 1.676 m (5' 6\"), weight 78.5 kg (173 lb), last menstrual period 05/21/2018, SpO2 98 %.  Constitutional: Alert, no distress, plus 3 vital signs  Skin:  Warm, dry, no rashes invisible areas  Eye: Equal, round and reactive, conjunctiva clear  ENMT: Her right tympanic membrane is erythematous and bulging but not perforated.  Left tympanic membrane is translucent and without any abnormalities.  No sinus tenderness.  Oropharynx is slightly injected without exudate. No tonsillar enlargement.    Neck: Mild anterior cervical, nontender lymphadenopathy  Respiratory: Unlabored respiration, lungs clear to auscultation, no wheezes, no rhonchi  Cardiovascular: Normal rate and rhythm, no murmur, no edema  Psych: Alert, pleasant, well-groomed, normal affect        A/P:  1. Acute suppurative otitis " media of right ear without spontaneous rupture of tympanic membrane, recurrence not specified  -Start clindamycin 300 mg 3 times daily for 7 days.  Discussed Flonase, appropriate amounts of ibuprofen and high water intake.  Follow-up within a week if not resolved, sooner with worsening.

## 2018-08-06 ENCOUNTER — OFFICE VISIT (OUTPATIENT)
Dept: URGENT CARE | Facility: PHYSICIAN GROUP | Age: 23
End: 2018-08-06
Payer: COMMERCIAL

## 2018-08-06 VITALS
HEART RATE: 90 BPM | DIASTOLIC BLOOD PRESSURE: 56 MMHG | BODY MASS INDEX: 28.45 KG/M2 | WEIGHT: 177 LBS | SYSTOLIC BLOOD PRESSURE: 110 MMHG | HEIGHT: 66 IN | OXYGEN SATURATION: 96 % | TEMPERATURE: 98.6 F

## 2018-08-06 DIAGNOSIS — H66.004 RECURRENT ACUTE SUPPURATIVE OTITIS MEDIA OF RIGHT EAR WITHOUT SPONTANEOUS RUPTURE OF TYMPANIC MEMBRANE: ICD-10-CM

## 2018-08-06 PROCEDURE — 99214 OFFICE O/P EST MOD 30 MIN: CPT | Performed by: FAMILY MEDICINE

## 2018-08-06 RX ORDER — EPINEPHRINE 1 MG/ML(1)
0.5 AMPUL (ML) INJECTION ONCE
Status: ON HOLD | COMMUNITY
End: 2023-01-06

## 2018-08-06 RX ORDER — CLINDAMYCIN HYDROCHLORIDE 300 MG/1
300 CAPSULE ORAL 3 TIMES DAILY
Qty: 21 CAP | Refills: 0 | Status: SHIPPED | OUTPATIENT
Start: 2018-08-06 | End: 2018-08-13

## 2018-08-08 ASSESSMENT — ENCOUNTER SYMPTOMS
SINUS PAIN: 0
MYALGIAS: 0
WEIGHT LOSS: 0
HEADACHES: 0
COUGH: 0
EYE DISCHARGE: 0

## 2018-08-08 NOTE — PROGRESS NOTES
"Subjective:      Kelvin Malagon is a 22 y.o. female who presents with Otalgia (right ear pain,  x 3days)            3 days right ear pain.  No drainage.  No fever.  She does have a past medical history of recurrent otitis media.  Hearing is muffled.  Mild sore throat.  No recent swimming.  No trauma/barotrauma.  Minimal relief with OTC medications.  Symptoms are moderate and progressively worse.  No aggravating or alleviating factors.        Review of Systems   Constitutional: Negative for malaise/fatigue and weight loss.   HENT: Negative for sinus pain.    Eyes: Negative for discharge.   Respiratory: Negative for cough.    Musculoskeletal: Negative for joint pain and myalgias.   Skin: Negative for itching and rash.   Neurological: Negative for headaches.       .  Medications, Allergies, and current problem list reviewed today in Epic     Objective:     /56   Pulse 90   Temp 37 °C (98.6 °F)   Ht 1.676 m (5' 6\")   Wt 80.3 kg (177 lb)   SpO2 96%   BMI 28.57 kg/m²      Physical Exam   Constitutional: She is oriented to person, place, and time. She appears well-developed and well-nourished. No distress.   HENT:   Head: Normocephalic and atraumatic.   Left Ear: External ear normal.   Right TM red and bulging.  Light reflex is preserved.   Eyes: Conjunctivae are normal.   Neck: Neck supple.   Cardiovascular: Normal rate, regular rhythm and normal heart sounds.    Pulmonary/Chest: Effort normal and breath sounds normal.   Lymphadenopathy:     She has no cervical adenopathy.   Neurological: She is alert and oriented to person, place, and time.   Skin: Skin is warm and dry.               Assessment/Plan:     1. Recurrent acute suppurative otitis media of right ear without spontaneous rupture of tympanic membrane    - clindamycin (CLEOCIN) 300 MG Cap; Take 1 Cap by mouth 3 times a day for 7 days.  Dispense: 21 Cap; Refill: 0  - REFERRAL TO ENT    Differential diagnosis, natural history, supportive care, and " indications for immediate follow-up discussed at length.

## 2018-10-24 ENCOUNTER — HOSPITAL ENCOUNTER (OUTPATIENT)
Dept: LAB | Facility: MEDICAL CENTER | Age: 23
End: 2018-10-24
Attending: NURSE PRACTITIONER
Payer: COMMERCIAL

## 2018-10-24 ENCOUNTER — OFFICE VISIT (OUTPATIENT)
Dept: MEDICAL GROUP | Facility: LAB | Age: 23
End: 2018-10-24
Payer: COMMERCIAL

## 2018-10-24 VITALS
TEMPERATURE: 98.7 F | BODY MASS INDEX: 27.97 KG/M2 | WEIGHT: 174 LBS | SYSTOLIC BLOOD PRESSURE: 108 MMHG | HEART RATE: 80 BPM | HEIGHT: 66 IN | RESPIRATION RATE: 12 BRPM | DIASTOLIC BLOOD PRESSURE: 66 MMHG | OXYGEN SATURATION: 97 %

## 2018-10-24 DIAGNOSIS — F41.9 ANXIETY: ICD-10-CM

## 2018-10-24 DIAGNOSIS — R63.5 WEIGHT GAIN: ICD-10-CM

## 2018-10-24 DIAGNOSIS — F32.89 OTHER DEPRESSION: ICD-10-CM

## 2018-10-24 DIAGNOSIS — R51.9 GENERALIZED HEADACHES: ICD-10-CM

## 2018-10-24 LAB
BASOPHILS # BLD AUTO: 0.6 % (ref 0–1.8)
BASOPHILS # BLD: 0.03 K/UL (ref 0–0.12)
EOSINOPHIL # BLD AUTO: 0.06 K/UL (ref 0–0.51)
EOSINOPHIL NFR BLD: 1.1 % (ref 0–6.9)
ERYTHROCYTE [DISTWIDTH] IN BLOOD BY AUTOMATED COUNT: 41.2 FL (ref 35.9–50)
HCT VFR BLD AUTO: 42.7 % (ref 37–47)
HGB BLD-MCNC: 14 G/DL (ref 12–16)
IMM GRANULOCYTES # BLD AUTO: 0.01 K/UL (ref 0–0.11)
IMM GRANULOCYTES NFR BLD AUTO: 0.2 % (ref 0–0.9)
LYMPHOCYTES # BLD AUTO: 1.93 K/UL (ref 1–4.8)
LYMPHOCYTES NFR BLD: 35.9 % (ref 22–41)
MCH RBC QN AUTO: 27.9 PG (ref 27–33)
MCHC RBC AUTO-ENTMCNC: 32.8 G/DL (ref 33.6–35)
MCV RBC AUTO: 85.1 FL (ref 81.4–97.8)
MONOCYTES # BLD AUTO: 0.49 K/UL (ref 0–0.85)
MONOCYTES NFR BLD AUTO: 9.1 % (ref 0–13.4)
NEUTROPHILS # BLD AUTO: 2.86 K/UL (ref 2–7.15)
NEUTROPHILS NFR BLD: 53.1 % (ref 44–72)
NRBC # BLD AUTO: 0 K/UL
NRBC BLD-RTO: 0 /100 WBC
PLATELET # BLD AUTO: 287 K/UL (ref 164–446)
PMV BLD AUTO: 10.7 FL (ref 9–12.9)
RBC # BLD AUTO: 5.02 M/UL (ref 4.2–5.4)
WBC # BLD AUTO: 5.4 K/UL (ref 4.8–10.8)

## 2018-10-24 PROCEDURE — 85025 COMPLETE CBC W/AUTO DIFF WBC: CPT

## 2018-10-24 PROCEDURE — 36415 COLL VENOUS BLD VENIPUNCTURE: CPT

## 2018-10-24 PROCEDURE — 80053 COMPREHEN METABOLIC PANEL: CPT

## 2018-10-24 PROCEDURE — 99214 OFFICE O/P EST MOD 30 MIN: CPT | Performed by: NURSE PRACTITIONER

## 2018-10-24 PROCEDURE — 84443 ASSAY THYROID STIM HORMONE: CPT

## 2018-10-24 RX ORDER — VENLAFAXINE HYDROCHLORIDE 37.5 MG/1
37.5 CAPSULE, EXTENDED RELEASE ORAL DAILY
Qty: 30 CAP | Refills: 0 | Status: SHIPPED | OUTPATIENT
Start: 2018-10-24 | End: 2018-11-14 | Stop reason: SDUPTHER

## 2018-10-24 RX ORDER — MELOXICAM 7.5 MG/1
7.5 TABLET ORAL DAILY
COMMUNITY
End: 2019-01-24

## 2018-10-24 NOTE — PROGRESS NOTES
"Chief Complaint   Patient presents with   • Headache     daily/ X 3 weeks stopped both maintenance meds       HPI  Laquita is a 23-year-old established female here with complaint of headaches, anxiety and depression.  Headaches have been a chronic issue for her for several years but fortunately improved with the addition of Topamax and propanolol last year.  Unfortunately she comes in today because her headaches returned about a month ago.  She is having a generalized headache everyday x the past 3-4 weeks.  Prior to 4 weeks ago, had one migraine in 3 months.  Stopped topamax and propranolol 4 mo ago b/c they made her feel dizzy and fatigued.    She also describes increase in her chronic anxiety and new onset depression.  Tells me that she has had anxiety since she was in middle school and has a family history of depression.  She is experiencing anxiety and depression with tearfulness and lack of desire to get out of bed which started about a month ago, seems to be worsening.  Denies any suicidal or homicidal ideations.  Denies any self-harm behaviors.   Lower appetite and libido.    Living with parents but moving out 11/28.   Grades are doing great and she loves school  Good relationship.      Past medical, surgical, family, and social history is reviewed and updated in Epic chart by me today.   Medications and allergies reviewed and updated in Epic chart by me today.     ROS:   As documented in history of present illness above    Exam:  Blood pressure 108/66, pulse 80, temperature 37.1 °C (98.7 °F), resp. rate 12, height 1.676 m (5' 6\"), weight 78.9 kg (174 lb), last menstrual period 10/06/2018, SpO2 97 %, not currently breastfeeding.  Constitutional: Alert, no distress, plus 3 vital signs  Skin:  Warm, dry, no rashes invisible areas  Eye: Equal, round and reactive, conjunctiva clear  ENMT: Lips without lesions, good dentition, oropharynx clear    Neck: Trachea midline, no masses, no thyromegaly  Respiratory: " Unlabored respiration, lungs clear to auscultation, no wheezes, no rhonchi  Cardiovascular: Normal rate and rhythm  Psych: Alert, pleasant, well-groomed, easily tearful.  Makes good eye contact.  No hallucinations and she is not delusional.  Neurologic: Alert and oriented. Cranial nerves II-XII intact, EOMs intact, no tongue deviation, PERRL, no facial asymmetry to motor or sensation, symmetric palate, normal finger-to-nose test, no pronator drift. No focal motor deficits. Symmetric reflexes. Normal station and gait, normal tandem walk. Coordination normal    A/P:  1. Anxiety  - TSH; Future  - CBC WITH DIFFERENTIAL; Future  - COMP METABOLIC PANEL; Future    2. Other depression  - TSH; Future  - CBC WITH DIFFERENTIAL; Future  - COMP METABOLIC PANEL; Future    3. Weight gain  -had bagel with cream cheese this morning  - TSH; Future  - CBC WITH DIFFERENTIAL; Future  - COMP METABOLIC PANEL; Future    Plan: Recommend labs as above.  Encouraged her to start venlafaxine which should help with anxiety, depression and possible migraine prevention.  Encouraged her to start exercising, eat healthy and see a therapist.  She declines a therapist referral.  She again denies any suicidal or homicidal ideations.  Discussed potential side effects of Effexor as well as length of onset efficacy.  Recommend she follow-up here in 3-1/2 weeks.

## 2018-10-25 LAB
ALBUMIN SERPL BCP-MCNC: 4.7 G/DL (ref 3.2–4.9)
ALBUMIN/GLOB SERPL: 1.3 G/DL
ALP SERPL-CCNC: 68 U/L (ref 30–99)
ALT SERPL-CCNC: 13 U/L (ref 2–50)
ANION GAP SERPL CALC-SCNC: 7 MMOL/L (ref 0–11.9)
AST SERPL-CCNC: 12 U/L (ref 12–45)
BILIRUB SERPL-MCNC: 0.5 MG/DL (ref 0.1–1.5)
BUN SERPL-MCNC: 14 MG/DL (ref 8–22)
CALCIUM SERPL-MCNC: 10 MG/DL (ref 8.5–10.5)
CHLORIDE SERPL-SCNC: 104 MMOL/L (ref 96–112)
CO2 SERPL-SCNC: 27 MMOL/L (ref 20–33)
CREAT SERPL-MCNC: 0.7 MG/DL (ref 0.5–1.4)
FASTING STATUS PATIENT QL REPORTED: NORMAL
GLOBULIN SER CALC-MCNC: 3.5 G/DL (ref 1.9–3.5)
GLUCOSE SERPL-MCNC: 83 MG/DL (ref 65–99)
POTASSIUM SERPL-SCNC: 4.1 MMOL/L (ref 3.6–5.5)
PROT SERPL-MCNC: 8.2 G/DL (ref 6–8.2)
SODIUM SERPL-SCNC: 138 MMOL/L (ref 135–145)
TSH SERPL DL<=0.005 MIU/L-ACNC: 1.47 UIU/ML (ref 0.38–5.33)

## 2018-11-14 ENCOUNTER — OFFICE VISIT (OUTPATIENT)
Dept: MEDICAL GROUP | Facility: LAB | Age: 23
End: 2018-11-14
Payer: COMMERCIAL

## 2018-11-14 VITALS
SYSTOLIC BLOOD PRESSURE: 98 MMHG | WEIGHT: 172 LBS | BODY MASS INDEX: 27.64 KG/M2 | DIASTOLIC BLOOD PRESSURE: 68 MMHG | HEIGHT: 66 IN | HEART RATE: 70 BPM | TEMPERATURE: 97.4 F | OXYGEN SATURATION: 96 % | RESPIRATION RATE: 12 BRPM

## 2018-11-14 DIAGNOSIS — G43.109 MIGRAINE WITH AURA AND WITHOUT STATUS MIGRAINOSUS, NOT INTRACTABLE: ICD-10-CM

## 2018-11-14 DIAGNOSIS — F32.A MILD DEPRESSION: ICD-10-CM

## 2018-11-14 DIAGNOSIS — F41.9 ANXIETY: ICD-10-CM

## 2018-11-14 PROCEDURE — 99214 OFFICE O/P EST MOD 30 MIN: CPT | Performed by: NURSE PRACTITIONER

## 2018-11-14 RX ORDER — ELETRIPTAN HYDROBROMIDE 20 MG/1
20 TABLET, FILM COATED ORAL
Qty: 9 TAB | Refills: 2 | Status: SHIPPED | OUTPATIENT
Start: 2018-11-14 | End: 2019-01-24

## 2018-11-14 RX ORDER — VENLAFAXINE HYDROCHLORIDE 75 MG/1
75 CAPSULE, EXTENDED RELEASE ORAL DAILY
Qty: 30 CAP | Refills: 5 | Status: SHIPPED | OUTPATIENT
Start: 2018-11-14 | End: 2019-02-13 | Stop reason: SDUPTHER

## 2018-11-14 NOTE — PROGRESS NOTES
"Chief Complaint   Patient presents with   • Anxiety     follow up       HPI  Laquita is a 23-year-old established female here to follow-up on her visit from October 24 in which we discussed headaches, anxiety and depression.  Prior to October she was only having one migraine every 3 months, these had increased in frequency prior to our last visit - occurring daily for 4 weeks.  She had stopped Topamax and propanolol over the summer because of dizziness and fatigue.  In terms of her anxiety and depression, she was feeling tearful, low motivation and just overall pretty anxious.  We did lab work and started venlafaxine.  She declined seeing a therapist at our last visit.  She tells me that her anxiety is dramatically improved with Effexor, depression is now mild but still present.  Her motivation has not really improved very much although she is doing well in school.  She is looking forward to moving into an apartment with her boyfriend in 2 weeks.  She is looking forward to the holidays.  She denies any suicidal or homicidal ideations.  She is having much more vivid dreams than she ever used to.  Her appetite is good.  She is exercising.  She is requesting a refill of Relpax which has been helpful for her in the past to abort migraines when a significantly more painful migraine comes on.  She denies any previous negative side effects of Relpax.      Past medical, surgical, family, and social history is reviewed and updated in Epic chart by me today.   Medications and allergies reviewed and updated in Epic chart by me today.     ROS:   Denies self harm.  No n/v/d.    Exam:  Blood pressure (!) 98/68, pulse 70, temperature 36.3 °C (97.4 °F), resp. rate 12, height 1.676 m (5' 6\"), weight 78 kg (172 lb), last menstrual period 10/31/2018, SpO2 96 %, not currently breastfeeding.  Gen. ; appears moderately overweight but otherwise healthy   Skin; appropriate for sex and age no significant lesions.   EYE general inspection " unremarkable  ENT; general inspection unremarkable   Lungs; clear to auscultation no rales no wheezes   Heart; regular rhythm  Neurologic; gait and station normal   Psych alert, oriented, appropriate    Assessment:  1. Migraine with aura and without status migrainosus, not intractable  venlafaxine XR (EFFEXOR XR) 75 MG CAPSULE SR 24 HR   2. Mild depression (HCC)  venlafaxine XR (EFFEXOR XR) 75 MG CAPSULE SR 24 HR   3. Anxiety  venlafaxine XR (EFFEXOR XR) 75 MG CAPSULE SR 24 HR     Plan / Medical Decision making:   Recommend increasing her Effexor to 75 mg in hopes of decreasing her headache frequency, improving her depression and motivation.  Her anxiety is doing well.  Refilled Relpax for abortive migraine therapy.  Discussed potential side effects of increasing her Effexor and reminded her of how to take Relpax.  Encouraged her to continue with good nutrition and daily physical activity.  Again she declines a referral to see a therapist.  I encouraged her to follow-up here in 3 months

## 2019-01-03 ENCOUNTER — OFFICE VISIT (OUTPATIENT)
Dept: URGENT CARE | Facility: PHYSICIAN GROUP | Age: 24
End: 2019-01-03
Payer: COMMERCIAL

## 2019-01-03 VITALS
WEIGHT: 170 LBS | TEMPERATURE: 97.9 F | BODY MASS INDEX: 27.32 KG/M2 | HEART RATE: 74 BPM | OXYGEN SATURATION: 98 % | RESPIRATION RATE: 16 BRPM | SYSTOLIC BLOOD PRESSURE: 112 MMHG | HEIGHT: 66 IN | DIASTOLIC BLOOD PRESSURE: 82 MMHG

## 2019-01-03 DIAGNOSIS — Z28.21 INFLUENZA VACCINATION DECLINED BY PATIENT: ICD-10-CM

## 2019-01-03 DIAGNOSIS — J06.9 UPPER RESPIRATORY TRACT INFECTION, UNSPECIFIED TYPE: ICD-10-CM

## 2019-01-03 DIAGNOSIS — J04.0 LARYNGITIS, ACUTE: ICD-10-CM

## 2019-01-03 PROCEDURE — 99214 OFFICE O/P EST MOD 30 MIN: CPT | Performed by: NURSE PRACTITIONER

## 2019-01-03 RX ORDER — FLUTICASONE PROPIONATE 50 MCG
2 SPRAY, SUSPENSION (ML) NASAL DAILY
Qty: 1 BOTTLE | Refills: 0 | Status: SHIPPED | OUTPATIENT
Start: 2019-01-03 | End: 2019-02-01

## 2019-01-03 RX ORDER — BENZONATATE 200 MG/1
200 CAPSULE ORAL 3 TIMES DAILY PRN
Qty: 30 CAP | Refills: 0 | Status: SHIPPED | OUTPATIENT
Start: 2019-01-03 | End: 2019-01-24

## 2019-01-03 ASSESSMENT — ENCOUNTER SYMPTOMS
RHINORRHEA: 0
HEADACHES: 1
WHEEZING: 0
HEARTBURN: 0
SORE THROAT: 1
HEMOPTYSIS: 0
SHORTNESS OF BREATH: 0
COUGH: 1
SWEATS: 0
CHILLS: 1
MYALGIAS: 0
FEVER: 1
WEIGHT LOSS: 0

## 2019-01-03 ASSESSMENT — COPD QUESTIONNAIRES: COPD: 0

## 2019-01-03 NOTE — PROGRESS NOTES
"Subjective:      Kelvin Malagon is a 23 y.o. female who presents with Cough (1.5 weeks// chest congestion// no voice)    Denies past medical, surgical or family history that is significant to today's problem.   RX or OTC medications-- reviewed with patient today.   Allergies   Allergen Reactions   • Bactrim    • Pcn [Penicillins]      Urticarial rash     Did not get an influenza vaccination this year.         Cough   This is a new problem. The current episode started 1 to 4 weeks ago. The problem has been gradually worsening. The problem occurs constantly. The cough is productive of purulent sputum and productive of sputum. Associated symptoms include chills, ear congestion, ear pain, a fever, headaches, nasal congestion and a sore throat. Pertinent negatives include no chest pain, heartburn, hemoptysis, myalgias, postnasal drip, rash, rhinorrhea, shortness of breath, sweats, weight loss or wheezing. Associated symptoms comments: Hoarse voice  . Nothing aggravates the symptoms. She has tried OTC cough suppressant (OTC decongestants, cold medications) for the symptoms. The treatment provided mild relief. There is no history of asthma, bronchiectasis, bronchitis, COPD, emphysema, environmental allergies or pneumonia.       Review of Systems   Constitutional: Positive for chills and fever. Negative for weight loss.   HENT: Positive for ear pain and sore throat. Negative for postnasal drip and rhinorrhea.    Respiratory: Positive for cough. Negative for hemoptysis, shortness of breath and wheezing.    Cardiovascular: Negative for chest pain.   Gastrointestinal: Negative for heartburn.   Musculoskeletal: Negative for myalgias.   Skin: Negative for rash.   Neurological: Positive for headaches.   Endo/Heme/Allergies: Negative for environmental allergies.          Objective:     /82 (BP Location: Left arm, Patient Position: Sitting)   Pulse 74   Temp 36.6 °C (97.9 °F) (Temporal)   Resp 16   Ht 1.676 m (5' 6\")   Wt " 77.1 kg (170 lb)   SpO2 98%   BMI 27.44 kg/m²      Physical Exam   Constitutional: She is oriented to person, place, and time. Vital signs are normal. She appears well-developed and well-nourished.  Non-toxic appearance. She does not have a sickly appearance. She does not appear ill. No distress.   HENT:   Head: Normocephalic.   Right Ear: Hearing, external ear and ear canal normal. Tympanic membrane is injected. Tympanic membrane is not erythematous. No middle ear effusion.   Left Ear: Hearing, external ear and ear canal normal. Tympanic membrane is injected. Tympanic membrane is not erythematous.  No middle ear effusion.   Nose: Rhinorrhea present. No mucosal edema. Right sinus exhibits no maxillary sinus tenderness and no frontal sinus tenderness. Left sinus exhibits no maxillary sinus tenderness and no frontal sinus tenderness.   Mouth/Throat: Uvula is midline. Posterior oropharyngeal erythema present. No oropharyngeal exudate, posterior oropharyngeal edema or tonsillar abscesses.   Eyes: Pupils are equal, round, and reactive to light.   Neck: Trachea normal, normal range of motion and full passive range of motion without pain. Neck supple.   Cardiovascular: Normal rate, regular rhythm and normal pulses.  PMI is not displaced.    Pulmonary/Chest: Effort normal and breath sounds normal. No accessory muscle usage. No tachypnea. No respiratory distress. She has no decreased breath sounds. She has no wheezes. She has no rhonchi. She has no rales.   Abdominal: Soft. Normal appearance. There is no tenderness.   Musculoskeletal: Normal range of motion.   Lymphadenopathy:     She has no cervical adenopathy.        Right: No supraclavicular adenopathy present.        Left: No supraclavicular adenopathy present.   Neurological: She is alert and oriented to person, place, and time. She has normal strength. Gait normal.   Skin: Skin is warm and dry. Capillary refill takes less than 2 seconds.   Psychiatric: She has a  normal mood and affect. Her speech is normal and behavior is normal.   Nursing note and vitals reviewed.              Assessment/Plan:     1. Upper respiratory tract infection, unspecified type  benzonatate (TESSALON) 200 MG capsule    fluticasone (FLONASE) 50 MCG/ACT nasal spray   2. Laryngitis, acute     3. Influenza vaccination declined by patient           Discussed that I felt this was viral in nature. Did not see any evidence of a bacterial process. Discussed natural progression and sx care.    Educated in natural progression of disease with supportive care and symptomatic relief of symptoms. Educated in s/s that would need further evaluation and management in ER, UC or by PCP. Follow up prn for new or worsening symptoms.   Keep well hydrated- Increase rest  Treat fever > 101.5 with OTC ibuprofen or acetaminophen. Follow Dosage and safety directions of the .   Educated in infection control practices.   Do not return to work until fever free for 24 hours.   OTC decongestant of choice. Follow manufacturers guidelines for dosing and safety precautions.   OTC antihistamine of choice. Follow manufactures dosing and safety guidelines.     Recommend influenza vaccination.  Patient declines it today.    Patient was in agreement with this treatment plan and seemed to understand without barriers. All questions were encouraged and answered

## 2019-01-24 ENCOUNTER — OFFICE VISIT (OUTPATIENT)
Dept: MEDICAL GROUP | Facility: LAB | Age: 24
End: 2019-01-24
Payer: COMMERCIAL

## 2019-01-24 VITALS
DIASTOLIC BLOOD PRESSURE: 74 MMHG | TEMPERATURE: 98.3 F | HEIGHT: 66 IN | WEIGHT: 173 LBS | RESPIRATION RATE: 12 BRPM | BODY MASS INDEX: 27.8 KG/M2 | SYSTOLIC BLOOD PRESSURE: 104 MMHG | OXYGEN SATURATION: 96 % | HEART RATE: 74 BPM

## 2019-01-24 DIAGNOSIS — J02.9 SORE THROAT: ICD-10-CM

## 2019-01-24 DIAGNOSIS — J01.00 ACUTE NON-RECURRENT MAXILLARY SINUSITIS: ICD-10-CM

## 2019-01-24 LAB
INT CON NEG: NEGATIVE
INT CON POS: POSITIVE
S PYO AG THROAT QL: NEGATIVE

## 2019-01-24 PROCEDURE — 99214 OFFICE O/P EST MOD 30 MIN: CPT | Performed by: NURSE PRACTITIONER

## 2019-01-24 PROCEDURE — 87880 STREP A ASSAY W/OPTIC: CPT | Performed by: NURSE PRACTITIONER

## 2019-01-24 RX ORDER — CLARITHROMYCIN 250 MG/1
250 TABLET, FILM COATED ORAL 2 TIMES DAILY
Qty: 14 TAB | Refills: 0 | Status: SHIPPED | OUTPATIENT
Start: 2019-01-24 | End: 2019-01-31

## 2019-01-24 ASSESSMENT — PATIENT HEALTH QUESTIONNAIRE - PHQ9: CLINICAL INTERPRETATION OF PHQ2 SCORE: 0

## 2019-01-24 NOTE — PROGRESS NOTES
"Chief Complaint   Patient presents with   • Pharyngitis     X 3 weeks/ no fever       HPI  Laquita is a 23-year-old established female here with complaint of sore throat, facial pressure, ear discomfort and congestion for the past 3 weeks -new issue to me today.  Tells me her symptoms initially involved a cough and not feeling well although the symptoms resolved about a week ago.  She feels her throat pain and sinus congestion is worsening.  She is producing very minimal mucus.  Non-smoker, no history of asthma and is not immunocompromised.  She has tried ibuprofen, Tylenol, Flonase, D and a lot of water intake without improvement.  Positive sick contacts at work.  Denies body aches or feeling extremely fatigued.    Past medical, surgical, family, and social history is reviewed and updated in Epic chart by me today.   Medications and allergies reviewed and updated in Epic chart by me today.     Review Of Systems  Skin: negative for rash, changing moles, abnormal pigmentation, hair or nail changes.  Gastrointestinal: Negative for abdominal pain, hemorrhoids, constipation or diarrhea, black stool or bloody stool  Genitourinary: negative for dysuria, frequency, incontinence  Musculoskeletal: negative for joint swelling and muscle pain/ soreness    Exam:  Blood pressure 104/74, pulse 74, temperature 36.8 °C (98.3 °F), resp. rate 12, height 1.676 m (5' 6\"), weight 78.5 kg (173 lb), last menstrual period 01/24/2019, SpO2 96 %, not currently breastfeeding.  Constitutional: Alert, no distress, plus 3 vital signs  Skin:  Warm, dry, no rashes invisible areas  Eye: Equal, round and reactive, conjunctiva clear  ENMT: Lips without lesions, good dentition, oropharynx clear    Neck: Trachea midline, no masses, no thyromegaly  Respiratory: Unlabored respiration, lungs clear to auscultation, no wheezes, no rhonchi  Cardiovascular: Normal rate and rhythm, no murmur, no edema  Abdomen: Soft, nontender, no masses or " hepatosplenomegaly  Psych: Alert, pleasant, well-groomed, normal affect    Assessment / Plan / Medical Decision makin. Acute non-recurrent maxillary sinusitis  -Suspect sinusitis related to duration and lack of improvement in symptoms.  Discontinued Flonase because it was not helpful and she may stay off of this as she did give it a 2-week trial.  Discussed high water intake, decongestants, NSAIDs and following up here in 10 days if symptoms have not resolved.  Discussed potential side effects of clarithromycin such as a metallic taste and GI upset.  - clarithromycin (BIAXIN) 250 MG Tab; Take 1 Tab by mouth 2 times a day for 7 days.  Dispense: 14 Tab; Refill: 0    2. Sore throat  -Negative point-of-care strep  - POCT Rapid Strep A

## 2019-02-01 ENCOUNTER — HOSPITAL ENCOUNTER (EMERGENCY)
Facility: MEDICAL CENTER | Age: 24
End: 2019-02-02
Attending: EMERGENCY MEDICINE
Payer: COMMERCIAL

## 2019-02-01 DIAGNOSIS — R20.2 PARESTHESIA: ICD-10-CM

## 2019-02-01 PROCEDURE — 96375 TX/PRO/DX INJ NEW DRUG ADDON: CPT

## 2019-02-01 PROCEDURE — 96374 THER/PROPH/DIAG INJ IV PUSH: CPT

## 2019-02-01 PROCEDURE — 99284 EMERGENCY DEPT VISIT MOD MDM: CPT

## 2019-02-01 PROCEDURE — 700111 HCHG RX REV CODE 636 W/ 250 OVERRIDE (IP): Performed by: EMERGENCY MEDICINE

## 2019-02-01 RX ORDER — LORAZEPAM 2 MG/ML
0.5 INJECTION INTRAMUSCULAR ONCE
Status: COMPLETED | OUTPATIENT
Start: 2019-02-02 | End: 2019-02-01

## 2019-02-01 RX ORDER — ONDANSETRON 2 MG/ML
4 INJECTION INTRAMUSCULAR; INTRAVENOUS ONCE
Status: COMPLETED | OUTPATIENT
Start: 2019-02-02 | End: 2019-02-01

## 2019-02-01 RX ADMIN — LORAZEPAM 0.5 MG: 2 INJECTION INTRAMUSCULAR; INTRAVENOUS at 23:57

## 2019-02-01 RX ADMIN — ONDANSETRON 4 MG: 2 INJECTION INTRAMUSCULAR; INTRAVENOUS at 23:57

## 2019-02-02 VITALS
OXYGEN SATURATION: 99 % | TEMPERATURE: 97.6 F | HEIGHT: 66 IN | DIASTOLIC BLOOD PRESSURE: 81 MMHG | WEIGHT: 179.23 LBS | HEART RATE: 75 BPM | SYSTOLIC BLOOD PRESSURE: 129 MMHG | BODY MASS INDEX: 28.81 KG/M2 | RESPIRATION RATE: 16 BRPM

## 2019-02-02 LAB
ALBUMIN SERPL BCP-MCNC: 4.2 G/DL (ref 3.2–4.9)
ALBUMIN/GLOB SERPL: 1.2 G/DL
ALP SERPL-CCNC: 74 U/L (ref 30–99)
ALT SERPL-CCNC: 7 U/L (ref 2–50)
ANION GAP SERPL CALC-SCNC: 9 MMOL/L (ref 0–11.9)
APPEARANCE UR: CLEAR
APTT PPP: 30.9 SEC (ref 24.7–36)
AST SERPL-CCNC: 12 U/L (ref 12–45)
BASOPHILS # BLD AUTO: 0.2 % (ref 0–1.8)
BASOPHILS # BLD: 0.02 K/UL (ref 0–0.12)
BILIRUB SERPL-MCNC: 0.3 MG/DL (ref 0.1–1.5)
BILIRUB UR QL STRIP.AUTO: NEGATIVE
BUN SERPL-MCNC: 18 MG/DL (ref 8–22)
CALCIUM SERPL-MCNC: 9.4 MG/DL (ref 8.5–10.5)
CHLORIDE SERPL-SCNC: 106 MMOL/L (ref 96–112)
CO2 SERPL-SCNC: 25 MMOL/L (ref 20–33)
COLOR UR: YELLOW
CREAT SERPL-MCNC: 0.79 MG/DL (ref 0.5–1.4)
EOSINOPHIL # BLD AUTO: 0 K/UL (ref 0–0.51)
EOSINOPHIL NFR BLD: 0 % (ref 0–6.9)
ERYTHROCYTE [DISTWIDTH] IN BLOOD BY AUTOMATED COUNT: 42.5 FL (ref 35.9–50)
GLOBULIN SER CALC-MCNC: 3.4 G/DL (ref 1.9–3.5)
GLUCOSE SERPL-MCNC: 83 MG/DL (ref 65–99)
GLUCOSE UR STRIP.AUTO-MCNC: NEGATIVE MG/DL
HCG UR QL: NEGATIVE
HCT VFR BLD AUTO: 41.4 % (ref 37–47)
HGB BLD-MCNC: 13.3 G/DL (ref 12–16)
IMM GRANULOCYTES # BLD AUTO: 0.02 K/UL (ref 0–0.11)
IMM GRANULOCYTES NFR BLD AUTO: 0.2 % (ref 0–0.9)
INR PPP: 1 (ref 0.87–1.13)
KETONES UR STRIP.AUTO-MCNC: NEGATIVE MG/DL
LEUKOCYTE ESTERASE UR QL STRIP.AUTO: NEGATIVE
LYMPHOCYTES # BLD AUTO: 3.6 K/UL (ref 1–4.8)
LYMPHOCYTES NFR BLD: 44.3 % (ref 22–41)
MCH RBC QN AUTO: 27.8 PG (ref 27–33)
MCHC RBC AUTO-ENTMCNC: 32.1 G/DL (ref 33.6–35)
MCV RBC AUTO: 86.4 FL (ref 81.4–97.8)
MICRO URNS: NORMAL
MONOCYTES # BLD AUTO: 0.53 K/UL (ref 0–0.85)
MONOCYTES NFR BLD AUTO: 6.5 % (ref 0–13.4)
NEUTROPHILS # BLD AUTO: 3.95 K/UL (ref 2–7.15)
NEUTROPHILS NFR BLD: 48.8 % (ref 44–72)
NITRITE UR QL STRIP.AUTO: NEGATIVE
NRBC # BLD AUTO: 0 K/UL
NRBC BLD-RTO: 0 /100 WBC
PH UR STRIP.AUTO: 5.5 [PH]
PLATELET # BLD AUTO: 297 K/UL (ref 164–446)
PMV BLD AUTO: 10.2 FL (ref 9–12.9)
POTASSIUM SERPL-SCNC: 3.8 MMOL/L (ref 3.6–5.5)
PROT SERPL-MCNC: 7.6 G/DL (ref 6–8.2)
PROT UR QL STRIP: NEGATIVE MG/DL
PROTHROMBIN TIME: 13.3 SEC (ref 12–14.6)
RBC # BLD AUTO: 4.79 M/UL (ref 4.2–5.4)
RBC UR QL AUTO: NEGATIVE
SODIUM SERPL-SCNC: 140 MMOL/L (ref 135–145)
SP GR UR REFRACTOMETRY: >=1.03
SP GR UR STRIP.AUTO: 1.03
UROBILINOGEN UR STRIP.AUTO-MCNC: 0.2 MG/DL
WBC # BLD AUTO: 8.1 K/UL (ref 4.8–10.8)

## 2019-02-02 PROCEDURE — 80053 COMPREHEN METABOLIC PANEL: CPT

## 2019-02-02 PROCEDURE — 85025 COMPLETE CBC W/AUTO DIFF WBC: CPT

## 2019-02-02 PROCEDURE — 85610 PROTHROMBIN TIME: CPT

## 2019-02-02 PROCEDURE — 81025 URINE PREGNANCY TEST: CPT

## 2019-02-02 PROCEDURE — 85730 THROMBOPLASTIN TIME PARTIAL: CPT

## 2019-02-02 PROCEDURE — 81003 URINALYSIS AUTO W/O SCOPE: CPT

## 2019-02-02 NOTE — ED TRIAGE NOTES
"Kelvin Malagon  23 y.o. female  Chief Complaint   Patient presents with   • Numbness     N/T x1 day in fingers and toes.    • Tingling   • Dizziness     x1 day       Pt ambulated to triage with steady gait for above complaint. Pt states the N/T has gotten worse over the past day, and now is almost like a \"burning.\"  Pt is alert and oriented, speaking in full sentences, follows commands and responds appropriately to questions. NAD. Resp are even and unlabored.     Pt returned to [employer], educated on triage process, and to inform staff of any changes or concerns.    Blood Pressure: 137/94, Pulse: 91, Respiration: 16, Temperature: 36.4 °C (97.6 °F), Height: 167.6 cm (5' 6\"), Weight: 81.3 kg (179 lb 3.7 oz), Pulse Oximetry: 98 %    "

## 2019-02-02 NOTE — ED PROVIDER NOTES
ED Provider Note    ED Provider Note      Primary care provider: LARRY Modi    CHIEF COMPLAINT  Chief Complaint   Patient presents with   • Numbness     N/T x1 day in fingers and toes.    • Tingling   • Dizziness     x1 day       HPI  Kelvin Malagon is a 23 y.o. female who presents to the Emergency Department with chief complaint of numbness and tingling.  Patient reports that throughout the day today she said some numbness tingling bilateral and she reports occasional muscular spasming in her legs as well.  No perioral numbness or tingling no altered vision feeling occasionally lightheaded/dizzy without description of any near syncope or vertigo no description of any spinning of the room she has no headache no neck pain no cough congestion chest pain shortness of breath no abdominal pain no constipation diarrhea dysuria hematuria melena hematochezia.  She does state that she was just recently treated with unknown antibiotic for sinus infection.  No chance of pregnancy at this time no urinary complaints no other symptoms or concerns.    REVIEW OF SYSTEMS  10 systems reviewed and otherwise negative, pertinent positives and negatives listed in the history of present illness.    PAST MEDICAL HISTORY   has a past medical history of Allergy; ASTHMA; and Clotting disorder (HCC).    SURGICAL HISTORY   has a past surgical history that includes knee arthroscopy (1/8/2009) and synovectomy (1/8/2009).    SOCIAL HISTORY  Social History   Substance Use Topics   • Smoking status: Never Smoker   • Smokeless tobacco: Never Used   • Alcohol use Yes      Comment: Occ      History   Drug Use No       FAMILY HISTORY  Non-Contributory    CURRENT MEDICATIONS  Home Medications     Reviewed by Kelsie Javier R.N. (Registered Nurse) on 02/01/19 at 2210  Med List Status: Partial   Medication Last Dose Status   EPINEPHrine HCl (EPINEPHRINE 1 MG/ML,1:1000,) 1 MG/ML Solution  Active   venlafaxine XR (EFFEXOR XR) 75 MG  "CAPSULE SR 24 HR  Active                ALLERGIES  Allergies   Allergen Reactions   • Bactrim    • Broccoli [Brassica Oleracea Italica]      \"upset stomach\"   • Crab      \"upset stomach\"   • Tuna      \"upset stomach\"   • Pcn [Penicillins]      Urticarial rash       PHYSICAL EXAM  VITAL SIGNS: /94   Pulse 91   Temp 36.4 °C (97.6 °F) (Temporal)   Resp 16   Ht 1.676 m (5' 6\")   Wt 81.3 kg (179 lb 3.7 oz)   LMP 01/24/2019   SpO2 98%   BMI 28.93 kg/m²   Pulse ox interpretation: I interpret this pulse ox as normal.  Constitutional: Alert and oriented x 3, no acute distress  HEENT: Atraumatic normocephalic, pupils are equal round reactive to light extraocular movements are intact. The nares is clear, external ears are normal, mouth shows moist mucous membranes  Neck: Supple, no JVD no tracheal deviation  Cardiovascular: Regular rate and rhythm no murmur rub or gallop 2+ pulses peripherally x4  Thorax & Lungs: No respiratory distress, no wheezes rales or rhonchi, No chest tenderness.   GI: Soft nontender nondistended positive bowel sounds, no peritoneal signs  Skin: Warm dry no acute rash or lesion  Musculoskeletal: Moving all extremities with full range and 5 of 5 strength, no acute  deformity  Neurologic: Cranial nerves III through XII are grossly intact, no sensory deficit, no cerebellar dysfunction   Psychiatric: Appropriate affect for situation at this time      DIAGNOSTIC STUDIES / PROCEDURES  LABS      Results for orders placed or performed during the hospital encounter of 02/01/19   CBC WITH DIFFERENTIAL   Result Value Ref Range    WBC 8.1 4.8 - 10.8 K/uL    RBC 4.79 4.20 - 5.40 M/uL    Hemoglobin 13.3 12.0 - 16.0 g/dL    Hematocrit 41.4 37.0 - 47.0 %    MCV 86.4 81.4 - 97.8 fL    MCH 27.8 27.0 - 33.0 pg    MCHC 32.1 (L) 33.6 - 35.0 g/dL    RDW 42.5 35.9 - 50.0 fL    Platelet Count 297 164 - 446 K/uL    MPV 10.2 9.0 - 12.9 fL    Neutrophils-Polys 48.80 44.00 - 72.00 %    Lymphocytes 44.30 (H) 22.00 - " 41.00 %    Monocytes 6.50 0.00 - 13.40 %    Eosinophils 0.00 0.00 - 6.90 %    Basophils 0.20 0.00 - 1.80 %    Immature Granulocytes 0.20 0.00 - 0.90 %    Nucleated RBC 0.00 /100 WBC    Neutrophils (Absolute) 3.95 2.00 - 7.15 K/uL    Lymphs (Absolute) 3.60 1.00 - 4.80 K/uL    Monos (Absolute) 0.53 0.00 - 0.85 K/uL    Eos (Absolute) 0.00 0.00 - 0.51 K/uL    Baso (Absolute) 0.02 0.00 - 0.12 K/uL    Immature Granulocytes (abs) 0.02 0.00 - 0.11 K/uL    NRBC (Absolute) 0.00 K/uL   COMP METABOLIC PANEL   Result Value Ref Range    Sodium 140 135 - 145 mmol/L    Potassium 3.8 3.6 - 5.5 mmol/L    Chloride 106 96 - 112 mmol/L    Co2 25 20 - 33 mmol/L    Anion Gap 9.0 0.0 - 11.9    Glucose 83 65 - 99 mg/dL    Bun 18 8 - 22 mg/dL    Creatinine 0.79 0.50 - 1.40 mg/dL    Calcium 9.4 8.5 - 10.5 mg/dL    AST(SGOT) 12 12 - 45 U/L    ALT(SGPT) 7 2 - 50 U/L    Alkaline Phosphatase 74 30 - 99 U/L    Total Bilirubin 0.3 0.1 - 1.5 mg/dL    Albumin 4.2 3.2 - 4.9 g/dL    Total Protein 7.6 6.0 - 8.2 g/dL    Globulin 3.4 1.9 - 3.5 g/dL    A-G Ratio 1.2 g/dL   PROTHROMBIN TIME   Result Value Ref Range    PT 13.3 12.0 - 14.6 sec    INR 1.00 0.87 - 1.13   APTT   Result Value Ref Range    APTT 30.9 24.7 - 36.0 sec   URINALYSIS,CULTURE IF INDICATED   Result Value Ref Range    Color Yellow     Character Clear     Specific Gravity 1.032 <1.035    Ph 5.5 5.0 - 8.0    Glucose Negative Negative mg/dL    Ketones Negative Negative mg/dL    Protein Negative Negative mg/dL    Bilirubin Negative Negative    Urobilinogen, Urine 0.2 Negative    Nitrite Negative Negative    Leukocyte Esterase Negative Negative    Occult Blood Negative Negative    Micro Urine Req see below    BETA-HCG QUALITATIVE URINE   Result Value Ref Range    Beta-Hcg Urine Negative Negative   REFRACTOMETER SG   Result Value Ref Range    Specific Gravity >=1.030    ESTIMATED GFR   Result Value Ref Range    GFR If African American >60 >60 mL/min/1.73 m 2    GFR If Non African American >60  ">60 mL/min/1.73 m 2       All labs reviewed by me.  COURSE & MEDICAL DECISION MAKING  Pertinent Labs & Imaging studies reviewed. (See chart for details)    10:52 PM - Patient seen and examined at bedside.       Patient noted to have slightly elevated blood pressure likely circumstantial secondary to presenting complaint. Referred to primary care physician for further evaluation.      Medical Decision Making: Patient does have a history of severe anxiety currently taking Effexor.  Labs as above are unremarkable.  Patient was given 1 dose of half a milligram of Ativan 4 mg of vision of all of her symptoms she still reports minor cramping in her extremities minor tingling however definitely improving.  Given instructions to return should she have any paresthesias worsening spasm cramping any other acute symptoms or changes otherwise follow-up with primary care at the next available time discharged home in stable and improved condition.    /94   Pulse 91   Temp 36.4 °C (97.6 °F) (Temporal)   Resp 16   Ht 1.676 m (5' 6\")   Wt 81.3 kg (179 lb 3.7 oz)   LMP 01/24/2019   SpO2 98%   BMI 28.93 kg/m²     Marlee Mora, A.P.N.  [id number] [address] [phone]    Schedule an appointment as soon as possible for a visit       [facility], Emergency Dept  [address] [facility] [address] [phone]    in 12-24 hours if symptoms persist,, immediately if symptoms worsen      New Prescriptions    No medications on file       FINAL IMPRESSION  1. Paresthesia Active          This dictation has been created using voice recognition software and/or scribes. The accuracy of the dictation is limited by the abilities of the software and the expertise of the scribes. I expect there may be some errors of grammar and possibly content. I made every attempt to manually correct the errors within my dictation. However, errors related to voice recognition software and/or " scribes may still exist and should be interpreted within the appropriate context.

## 2019-02-10 ENCOUNTER — HOSPITAL ENCOUNTER (OUTPATIENT)
Facility: MEDICAL CENTER | Age: 24
End: 2019-02-10
Attending: PHYSICIAN ASSISTANT
Payer: COMMERCIAL

## 2019-02-10 ENCOUNTER — OFFICE VISIT (OUTPATIENT)
Dept: URGENT CARE | Facility: PHYSICIAN GROUP | Age: 24
End: 2019-02-10
Payer: COMMERCIAL

## 2019-02-10 VITALS
BODY MASS INDEX: 28.77 KG/M2 | OXYGEN SATURATION: 97 % | HEIGHT: 66 IN | DIASTOLIC BLOOD PRESSURE: 74 MMHG | SYSTOLIC BLOOD PRESSURE: 118 MMHG | HEART RATE: 95 BPM | WEIGHT: 179 LBS | TEMPERATURE: 98.2 F

## 2019-02-10 DIAGNOSIS — N30.01 ACUTE CYSTITIS WITH HEMATURIA: ICD-10-CM

## 2019-02-10 DIAGNOSIS — R30.0 DYSURIA: ICD-10-CM

## 2019-02-10 DIAGNOSIS — R31.0 GROSS HEMATURIA: ICD-10-CM

## 2019-02-10 LAB
APPEARANCE UR: NORMAL
BILIRUB UR STRIP-MCNC: NEGATIVE MG/DL
COLOR UR AUTO: NORMAL
GLUCOSE UR STRIP.AUTO-MCNC: NEGATIVE MG/DL
INT CON NEG: NORMAL
INT CON POS: NORMAL
KETONES UR STRIP.AUTO-MCNC: NEGATIVE MG/DL
LEUKOCYTE ESTERASE UR QL STRIP.AUTO: NORMAL
NITRITE UR QL STRIP.AUTO: POSITIVE
PH UR STRIP.AUTO: 6 [PH] (ref 5–8)
POC URINE PREGNANCY TEST: NEGATIVE
PROT UR QL STRIP: 300 MG/DL
RBC UR QL AUTO: NORMAL
SP GR UR STRIP.AUTO: 1.03
UROBILINOGEN UR STRIP-MCNC: 0.2 MG/DL

## 2019-02-10 PROCEDURE — 87086 URINE CULTURE/COLONY COUNT: CPT

## 2019-02-10 PROCEDURE — 87077 CULTURE AEROBIC IDENTIFY: CPT

## 2019-02-10 PROCEDURE — 81002 URINALYSIS NONAUTO W/O SCOPE: CPT | Performed by: PHYSICIAN ASSISTANT

## 2019-02-10 PROCEDURE — 81025 URINE PREGNANCY TEST: CPT | Performed by: PHYSICIAN ASSISTANT

## 2019-02-10 PROCEDURE — 87186 SC STD MICRODIL/AGAR DIL: CPT

## 2019-02-10 PROCEDURE — 99214 OFFICE O/P EST MOD 30 MIN: CPT | Performed by: PHYSICIAN ASSISTANT

## 2019-02-10 RX ORDER — PHENAZOPYRIDINE HYDROCHLORIDE 200 MG/1
200 TABLET, FILM COATED ORAL 3 TIMES DAILY
Qty: 6 TAB | Refills: 0 | Status: SHIPPED | OUTPATIENT
Start: 2019-02-10 | End: 2019-02-12

## 2019-02-10 RX ORDER — NITROFURANTOIN 25; 75 MG/1; MG/1
100 CAPSULE ORAL EVERY 12 HOURS
Qty: 10 CAP | Refills: 0 | Status: SHIPPED | OUTPATIENT
Start: 2019-02-10 | End: 2019-02-15

## 2019-02-10 ASSESSMENT — ENCOUNTER SYMPTOMS
FLANK PAIN: 0
VOMITING: 0
CHILLS: 0
NAUSEA: 0

## 2019-02-10 NOTE — PROGRESS NOTES
"Subjective:   Kelvin Malagon is a 23 y.o. female who presents for UTI (x 2 days)        Dysuria    This is a new problem. The current episode started in the past 7 days. The problem occurs every urination. The problem has been gradually worsening. The quality of the pain is described as burning. There has been no fever. She is sexually active. There is no history of pyelonephritis. Associated symptoms include frequency and urgency. Pertinent negatives include no chills, flank pain, nausea or vomiting. Treatments tried: cranberry pills. The treatment provided mild relief. There is no history of kidney stones or recurrent UTIs.     Review of Systems   Constitutional: Negative for chills.   Gastrointestinal: Negative for nausea and vomiting.   Genitourinary: Positive for dysuria, frequency and urgency. Negative for flank pain.       PMH:  has a past medical history of Allergy; ASTHMA; and Clotting disorder (HCC). She also has no past medical history of Diabetes.  MEDS:   Current Outpatient Prescriptions:   •  nitrofurantoin monohydr macro (MACROBID) 100 MG Cap, Take 1 Cap by mouth every 12 hours for 5 days., Disp: 10 Cap, Rfl: 0  •  phenazopyridine (PYRIDIUM) 200 MG Tab, Take 1 Tab by mouth 3 times a day for 2 days., Disp: 6 Tab, Rfl: 0  •  venlafaxine XR (EFFEXOR XR) 75 MG CAPSULE SR 24 HR, Take 1 Cap by mouth every day., Disp: 30 Cap, Rfl: 5  •  EPINEPHrine HCl (EPINEPHRINE 1 MG/ML,1:1000,) 1 MG/ML Solution, 0.5 mg by Intramuscular route Once., Disp: , Rfl:   ALLERGIES:   Allergies   Allergen Reactions   • Bactrim    • Broccoli [Brassica Oleracea Italica]      \"upset stomach\"   • Crab      \"upset stomach\"   • Tuna      \"upset stomach\"   • Pcn [Penicillins]      Urticarial rash     SURGHX:   Past Surgical History:   Procedure Laterality Date   • KNEE ARTHROSCOPY  1/8/2009    Performed by SHAD CORDOBA at [facility] ORS   • SYNOVECTOMY  1/8/2009    Performed by SHAD CORDOBA at [facility] " "ORS     SOCHX:  reports that she has never smoked. She has never used smokeless tobacco. She reports that she drinks alcohol. She reports that she does not use drugs.  FH: Family history was reviewed, no pertinent findings to report   Objective:   /74   Pulse 95   Temp 36.8 °C (98.2 °F) (Temporal)   Ht 1.676 m (5' 6\")   Wt 81.2 kg (179 lb)   LMP 01/24/2019   SpO2 97%   BMI 28.89 kg/m²   Physical Exam   Constitutional: She appears well-developed and well-nourished.   HENT:   Head: Normocephalic and atraumatic.   Neck: Neck supple.   Pulmonary/Chest: Effort normal. No respiratory distress.   Abdominal: There is tenderness in the suprapubic area. There is no CVA tenderness.   Neurological: She is alert.   Skin: Skin is warm and dry.   Psychiatric: She has a normal mood and affect. Her behavior is normal. Thought content normal.   Vitals reviewed.        Assessment/Plan:   1. Acute cystitis with hematuria  - nitrofurantoin monohydr macro (MACROBID) 100 MG Cap; Take 1 Cap by mouth every 12 hours for 5 days.  Dispense: 10 Cap; Refill: 0  - phenazopyridine (PYRIDIUM) 200 MG Tab; Take 1 Tab by mouth 3 times a day for 2 days.  Dispense: 6 Tab; Refill: 0    2. Dysuria  - POCT Urinalysis  - POCT PREGNANCY  - Urine Culture; Future    3. Gross hematuria    UA and PE consistent with acute cystitis. Pyelonephritis unlikely as pt has no flank pain, CVA tenderness, N/V, F/C.     Macrobid 100mg, twice a day for 5 days.  Finish entire course of antibiotics even if symptoms resolve earlier.  Take a probiotic or eat Elana’s yogurt or kefir while taking the medication.  Drink 8, 8oz glasses of water every day.  If you have frequent UTIs or develop them after sexual intercourse try taking D-Mannose 1000mg, two or three times a day or for 3 days following intercourse.  If symptoms fail to improve in 48 hours, worsen or you develop fever, flank pain, nausea, vomiting, or other new symptoms return to the clinic immediately or go " the ER.    Differential diagnosis, natural history, supportive care, and indications for immediate follow-up discussed.

## 2019-02-13 ENCOUNTER — OFFICE VISIT (OUTPATIENT)
Dept: MEDICAL GROUP | Facility: LAB | Age: 24
End: 2019-02-13
Payer: COMMERCIAL

## 2019-02-13 VITALS
BODY MASS INDEX: 28.93 KG/M2 | WEIGHT: 180 LBS | HEART RATE: 98 BPM | HEIGHT: 66 IN | OXYGEN SATURATION: 98 % | SYSTOLIC BLOOD PRESSURE: 112 MMHG | TEMPERATURE: 98 F | RESPIRATION RATE: 12 BRPM | DIASTOLIC BLOOD PRESSURE: 62 MMHG

## 2019-02-13 DIAGNOSIS — G43.109 MIGRAINE WITH AURA AND WITHOUT STATUS MIGRAINOSUS, NOT INTRACTABLE: ICD-10-CM

## 2019-02-13 DIAGNOSIS — F41.9 ANXIETY: ICD-10-CM

## 2019-02-13 DIAGNOSIS — Z23 NEED FOR MENINGITIS VACCINATION: ICD-10-CM

## 2019-02-13 DIAGNOSIS — R20.2 PARESTHESIAS: ICD-10-CM

## 2019-02-13 DIAGNOSIS — F32.A MILD DEPRESSION: ICD-10-CM

## 2019-02-13 DIAGNOSIS — N39.0 ACUTE UTI: ICD-10-CM

## 2019-02-13 LAB
BACTERIA UR CULT: ABNORMAL
BACTERIA UR CULT: ABNORMAL
SIGNIFICANT IND 70042: ABNORMAL
SITE SITE: ABNORMAL
SOURCE SOURCE: ABNORMAL

## 2019-02-13 PROCEDURE — 99214 OFFICE O/P EST MOD 30 MIN: CPT | Mod: 25 | Performed by: NURSE PRACTITIONER

## 2019-02-13 PROCEDURE — 90471 IMMUNIZATION ADMIN: CPT | Performed by: NURSE PRACTITIONER

## 2019-02-13 PROCEDURE — 90621 MENB-FHBP VACC 2/3 DOSE IM: CPT | Performed by: NURSE PRACTITIONER

## 2019-02-13 RX ORDER — VENLAFAXINE HYDROCHLORIDE 150 MG/1
150 CAPSULE, EXTENDED RELEASE ORAL DAILY
Qty: 30 CAP | Refills: 5 | Status: SHIPPED | OUTPATIENT
Start: 2019-02-13 | End: 2019-08-13 | Stop reason: SDUPTHER

## 2019-02-13 NOTE — PROGRESS NOTES
"Chief Complaint   Patient presents with   • Anxiety     follow up meds   • ED Follow-Up     UTI       CARLA Coelho is a 23-year-old established female here with several issues:  #1- UTI:  Dx 2/10/2019 through UC b/c of symptoms beginning the day prior with hematuria and dysuria.  Only 15% better, complains of persistent dysuria, frequency and urgency -  is on day 3 of Macrobid.  A culture was sent but sensitivity is not back.  She is having occasional flank aching but no fevers, chills, nausea, vomiting or lower abdominal pain.  #2- anxiety : on 75 mg effexor daily.  Still having breakthrough anxiety about once weekly depending on what is going on in life.  Anxiety increases when she is by herself. Likes effexor b/c of no side effects such as nausea or chronic dizziness.  Migraines have improved as well since being on Effexor.  Has mild depression which she feels has improved.  #3- new onset tingling in hands and burning all over body since 2/1/2019 - went to ER for this- felt that she was lying on a bed with spikes.  Now having occasional burning to legs, tingling to hands / feet - spiked bed feeling has resolved.  No new routines / cosmetics / diets or OTC medication.  Mentions a family history of vitamin B12 deficiency in her maternal grandmother.  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  She has had MRIs and x-rays of her spine which do not show degenerative disc disease.  She is not having extremity weakness or easy falling.    Past medical, surgical, family, and social history is reviewed and updated in Epic chart by me today.   Medications and allergies reviewed and updated in Epic chart by me today.     ROS:   As documented in history of present illness above    Exam:  Blood pressure 112/62, pulse 98, temperature 36.7 °C (98 °F), resp. rate 12, height 1.676 m (5' 6\"), weight 81.6 kg (180 lb), last menstrual period 01/24/2019, SpO2 98 " %, not currently breastfeeding.  Constitutional: Alert, no distress, plus 3 vital signs  Skin:  Warm, dry, no rashes invisible areas  Eye: Equal, round and reactive, conjunctiva clear  ENMT: Lips without lesions  Respiratory: Unlabored respiration, lungs clear to auscultation, no wheezes, no rhonchi  Cardiovascular: Normal rate and rhythm.  Neurologic: Alert and oriented. Cranial nerves II-XII intact,  Symmetric reflexes. Normal station and gait.  Coordination normal  Psych: Alert, pleasant, well-groomed, normal affect    Assessment / Plan / Medical Decision makin. Migraine with aura and without status migrainosus, not intractable  -Improving.  She is seeing neurology tomorrow regarding her migraines.  - venlafaxine XR (EFFEXOR-XR) 150 MG extended-release capsule; Take 1 Cap by mouth every day.  Dispense: 30 Cap; Refill: 5    2. Mild depression (HCC)  -Improved.  - venlafaxine XR (EFFEXOR-XR) 150 MG extended-release capsule; Take 1 Cap by mouth every day.  Dispense: 30 Cap; Refill: 5    3. Anxiety  -Still having breakthrough anxiety at least weekly.  Recommend increasing venlafaxine to 150 mg daily and following up with me in 8 weeks.  - venlafaxine XR (EFFEXOR-XR) 150 MG extended-release capsule; Take 1 Cap by mouth every day.  Dispense: 30 Cap; Refill: 5    4. Paresthesias  -Uncertain etiology.  Reviewed all spinal imaging over the past 10 years with her which is within normal limits.  Discussed this could possibly be related to her anxiety.  Trial of B12 OTC.  Reviewed normal CBC.  + family hx of B12 deficiency in [facility].  Also encouraged her to discuss this with neurology tomorrow.    5. Acute UTI  -awaiting sensitivity.  Sensitivity has not returned by this evening, I let her know that I will be changing her antibiotic because of her persistent symptoms.  She will continue Macrobid for the day today and use Pyridium as needed.  She is drinking a lot of water and avoiding sugar.    6. Need for meningitis  vaccination  I have placed the below orders and discussed them with Dr. Sosa. the MA is performing the below orders under the direction of Dr. DOBBINS  - Meningococcal (IM) Group B

## 2019-02-14 ENCOUNTER — OFFICE VISIT (OUTPATIENT)
Dept: NEUROLOGY | Facility: MEDICAL CENTER | Age: 24
End: 2019-02-14
Payer: COMMERCIAL

## 2019-02-14 VITALS
HEIGHT: 66 IN | HEART RATE: 92 BPM | TEMPERATURE: 98.8 F | BODY MASS INDEX: 28.61 KG/M2 | SYSTOLIC BLOOD PRESSURE: 110 MMHG | DIASTOLIC BLOOD PRESSURE: 68 MMHG | OXYGEN SATURATION: 94 % | WEIGHT: 178 LBS

## 2019-02-14 DIAGNOSIS — G43.109 MIGRAINE WITH AURA AND WITHOUT STATUS MIGRAINOSUS, NOT INTRACTABLE: ICD-10-CM

## 2019-02-14 DIAGNOSIS — F41.9 ANXIETY: ICD-10-CM

## 2019-02-14 DIAGNOSIS — R20.2 PARESTHESIAS: ICD-10-CM

## 2019-02-14 DIAGNOSIS — G89.29 CHRONIC NONINTRACTABLE HEADACHE, UNSPECIFIED HEADACHE TYPE: ICD-10-CM

## 2019-02-14 DIAGNOSIS — R29.2 HYPERREFLEXIA: ICD-10-CM

## 2019-02-14 DIAGNOSIS — R51.9 CHRONIC NONINTRACTABLE HEADACHE, UNSPECIFIED HEADACHE TYPE: ICD-10-CM

## 2019-02-14 DIAGNOSIS — R20.8 DYSESTHESIA AFFECTING BOTH SIDES OF BODY: ICD-10-CM

## 2019-02-14 PROCEDURE — 99215 OFFICE O/P EST HI 40 MIN: CPT | Performed by: PSYCHIATRY & NEUROLOGY

## 2019-02-14 RX ORDER — DIAZEPAM 5 MG/1
5 TABLET ORAL EVERY 6 HOURS PRN
Qty: 1 TAB | Refills: 0 | Status: SHIPPED | OUTPATIENT
Start: 2019-02-14 | End: 2019-03-16

## 2019-02-14 ASSESSMENT — PATIENT HEALTH QUESTIONNAIRE - PHQ9
4. FEELING TIRED OR HAVING LITTLE ENERGY: MORE THAN HALF THE DAYS
2. FEELING DOWN, DEPRESSED, IRRITABLE, OR HOPELESS: SEVERAL DAYS
5. POOR APPETITE OR OVEREATING: SEVERAL DAYS
3. TROUBLE FALLING OR STAYING ASLEEP OR SLEEPING TOO MUCH: SEVERAL DAYS
8. MOVING OR SPEAKING SO SLOWLY THAT OTHER PEOPLE COULD HAVE NOTICED. OR THE OPPOSITE, BEING SO FIGETY OR RESTLESS THAT YOU HAVE BEEN MOVING AROUND A LOT MORE THAN USUAL: NOT AT ALL
1. LITTLE INTEREST OR PLEASURE IN DOING THINGS: SEVERAL DAYS
SUM OF ALL RESPONSES TO PHQ9 QUESTIONS 1 AND 2: 2
6. FEELING BAD ABOUT YOURSELF - OR THAT YOU ARE A FAILURE OR HAVE LET YOURSELF OR YOUR FAMILY DOWN: SEVERAL DAYS
7. TROUBLE CONCENTRATING ON THINGS, SUCH AS READING THE NEWSPAPER OR WATCHING TELEVISION: SEVERAL DAYS
SUM OF ALL RESPONSES TO PHQ QUESTIONS 1-9: 8
9. THOUGHTS THAT YOU WOULD BE BETTER OFF DEAD, OR OF HURTING YOURSELF: NOT AT ALL

## 2019-02-14 NOTE — PROGRESS NOTES
"CC: Migraine with Aura and Dysesthesias      HPI:    Kelvin Malagon is a 23 y.o. female who presents today in initial neurologic consultation for migraine headaches and dysesthesias. The patient was referred by their primary care provider, LARRY Modi.     Ms. Malagon tells me she has had headaches since she was 6 or 7 years old. She used to take ibuprofen and Coca-Cola, but this eventually lost efficacy. She had headaches throughout high school, but it was only as an adult that she tried medication. +photophobia, +phonophobia, motion sensitive. She has been to the hospital for her headaches 3 times in the last year. She had an adverse reaction to Reglan.     She was started on Topamax. Propranolol made her very dizzy and hypotensive. She stopped this and is now on Effexor for mild anxiety and depression. Her dose was recently increased to 150mg daily. This has been effective, but she has had two in the past 2 months. She will wake up with headaches.     She gets stress headaches which feel like a quick pinpoint in her temples which resolves quickly. She gets headaches that lead up to her migraines for which she takes Imitrex, otherwise, she gets full bown headache.     She gets what she calls \"rebound headaches\" the day after her migraines which feels like a hangover.     She takes excedrine prn before her headaches and ibuprofen as needed. She takes ibuprofen 600mg every morning for other \"aches and pains.\" She doesn't take it again unless she needs it.     The location of her headaches is always centered around her eyes and into the back of her head. The headaches that she awakens with stay til lunch time. Rebound headaches last a full 24 hour. The migraines also last a full 24 hours.     She gets spasms in her legs at night. She can be awake in bed and her legs begin moving. When she sleeps, her movements are also pronounced.     Two weeks ago, she awoke with tingling in her fingers and her feet. She " went to work and she had strange dysesthesias that felt like . She received ativan which took away the burning. She had blood working. No imaging was done. She spoke with her PCP. Her feet are no longer tingling, but her hands still are tingling. She denies any facial sensation changes.     In 2012, she got up in the morning and could not move her legs. A chiropractor told her that her muscles and ribs were so out of place. She had to use crutches to support herself. At this time there was associated pain throughout her spine. She had a lumbar spine MRI that was negative.       ROS:   Constitutional: No fevers or chills.  Eyes: No blurry vision or eye pain.  ENT: No dysphagia or hearing loss.  Respiratory: No cough or shortness of breath.  Cardiovascular: No chest pain or palpitations.  GI: No nausea, vomiting, +constipation/diarrhea chronically due to IBS.  : No urinary incontinence or dysuria.  Musculoskeletal: No joint swelling or arthralgias.  Skin: No skin rashes.  Neuro: + headaches, denies dizziness, or tremors.  Endocrine: No heat or cold intolerance. No polydipsia or polyuria.  Psych: No depression ,+ anxiety.  Heme/Lymph: No easy bruising or swollen lymph nodes.      Past Medical History:   Past Medical History:   Diagnosis Date   • Allergy    • Anxiety    • ASTHMA     child -cifuentes.  controlled without meds since 6 yrs old   • Clotting disorder (HCC)     patient unsure of the specifics (MTHFR) - heterozygous carrier    • Depression    • IBS (irritable bowel syndrome)    • Migraine        Past Surgical History:   Past Surgical History:   Procedure Laterality Date   • KNEE ARTHROSCOPY  1/8/2009    Performed by SHAD CORDOBA at SURGERY [facility]   • SYNOVECTOMY  1/8/2009    Performed by SHAD CORDOBA at [facility]       Social History:   Social History     Social History   • Marital status: Single     Spouse name: N/A   • Number of children: N/A   • Years of education: N/A  "    Occupational History   • Not on file.     Social History Main Topics   • Smoking status: Never Smoker   • Smokeless tobacco: Never Used   • Alcohol use Yes      Comment: Occ   • Drug use: No   • Sexual activity: Yes     Birth control/ protection: Condom     Other Topics Concern   • Not on file     Social History Narrative   • No narrative on file       Family History:   Family History   Problem Relation Age of Onset   • Non-contributory Mother    • Other Mother         MTHFR homozygous, migraine   • Non-contributory Father    • Alcohol/Drug Father    • Anxiety disorder Sister    • Other Brother         migraine   • Thyroid Maternal Grandmother         pernicious anemia       Allergies:   Allergies   Allergen Reactions   • Bactrim    • Broccoli [Brassica Oleracea Italica]      \"upset stomach\"   • Crab      \"upset stomach\"   • Tuna      \"upset stomach\"   • Pcn [Penicillins]      Urticarial rash       Physical Exam:     Ambulatory Vitals  Encounter Vitals  Temperature: 37.1 °C (98.8 °F)  Temp src: Temporal  Blood Pressure: 110/68  Pulse: 92  Pulse Oximetry: 94 %  Weight: 80.7 kg (178 lb)  Height: 167.6 cm (5' 6\")  BMI (Calculated): 28.73  Constitutional: Well-developed, well-nourished, good hygiene. Appears stated age.  Cardiovascular: RRR, with no murmurs, rubs or gallops. No carotid bruits.   Respiratory: Lungs CTA B/L, no W/R/R.   Abdomen: Soft Non-tender to Palpation. Non-distended.  Extremities: No peripheral edema, pedal pulses intact.   Skin: Warm, dry, intact. No rashes observed.  Eyes: Sclera anicteric   Funduscopic: Optic discs flat with no evidence of papilledema or pallor.   Neurologic:   Mental Status: Awake, alert, oriented x 3.   Speech: Fluent with normal prosody.   Memory: Able to recall recent and remote events accurately.    Concentration: Attentive. Able to focus on history and follow multi-step commands.              Fund of Knowledge: Appropriate   Cranial Nerves:    CN II: PERRL. No afferent " pupillary defect.    CN III, IV, VI: Good eye closure, EOMI.     CN V: Facial sensation intact and symmetric.     CN VII: No facial asymmetry.     CN VIII: Hearing intact.     CN IX and X: Palate elevates symmetrically. Normal gag reflex.    CN XI: Symmetric shoulder shrug.     CN XII: Tongue midline.    Sensory: Intact light touch, vibration and temperature.    Coordination: No evidence of past-pointing on finger to nose testing, no dysdiadochokinesia. Heel to shin intact.    DTR's: 3+ throughout without clonus.    Babinski: Toes equivocal bilaterally.   Romberg: Negative.   Movements: No tremors observed.   Musculoskeletal:    Strength: 5/5 in upper and lower extremities bilaterally.   Gait: Steady, narrow based.    Tone: Normal bulk and tone.   Joints: No swelling.     Labs:  Results for MIGUEL COURTNYE (MRN [id number]) as of 2/14/2019 08:54   Ref. Range 2/2/2019 00:00   WBC Latest Ref Range: 4.8 - 10.8 K/uL 8.1   RBC Latest Ref Range: 4.20 - 5.40 M/uL 4.79   Hemoglobin Latest Ref Range: 12.0 - 16.0 g/dL 13.3   Hematocrit Latest Ref Range: 37.0 - 47.0 % 41.4   MCV Latest Ref Range: 81.4 - 97.8 fL 86.4   MCH Latest Ref Range: 27.0 - 33.0 pg 27.8   MCHC Latest Ref Range: 33.6 - 35.0 g/dL 32.1 (L)   RDW Latest Ref Range: 35.9 - 50.0 fL 42.5   Platelet Count Latest Ref Range: 164 - 446 K/uL 297   MPV Latest Ref Range: 9.0 - 12.9 fL 10.2   Neutrophils-Polys Latest Ref Range: 44.00 - 72.00 % 48.80   Neutrophils (Absolute) Latest Ref Range: 2.00 - 7.15 K/uL 3.95   Lymphocytes Latest Ref Range: 22.00 - 41.00 % 44.30 (H)   Lymphs (Absolute) Latest Ref Range: 1.00 - 4.80 K/uL 3.60   Monocytes Latest Ref Range: 0.00 - 13.40 % 6.50   Monos (Absolute) Latest Ref Range: 0.00 - 0.85 K/uL 0.53   Eosinophils Latest Ref Range: 0.00 - 6.90 % 0.00   Eos (Absolute) Latest Ref Range: 0.00 - 0.51 K/uL 0.00   Basophils Latest Ref Range: 0.00 - 1.80 % 0.20   Baso (Absolute) Latest Ref Range: 0.00 - 0.12 K/uL 0.02   Immature Granulocytes  Latest Ref Range: 0.00 - 0.90 % 0.20   Immature Granulocytes (abs) Latest Ref Range: 0.00 - 0.11 K/uL 0.02   Nucleated RBC Latest Units: /100 WBC 0.00   NRBC (Absolute) Latest Units: K/uL 0.00   Sodium Latest Ref Range: 135 - 145 mmol/L 140   Potassium Latest Ref Range: 3.6 - 5.5 mmol/L 3.8   Chloride Latest Ref Range: 96 - 112 mmol/L 106   Co2 Latest Ref Range: 20 - 33 mmol/L 25   Anion Gap Latest Ref Range: 0.0 - 11.9  9.0   Glucose Latest Ref Range: 65 - 99 mg/dL 83   Bun Latest Ref Range: 8 - 22 mg/dL 18   Creatinine Latest Ref Range: 0.50 - 1.40 mg/dL 0.79   GFR If  Latest Ref Range: >60 mL/min/1.73 m 2 >60   GFR If Non  Latest Ref Range: >60 mL/min/1.73 m 2 >60   Calcium Latest Ref Range: 8.5 - 10.5 mg/dL 9.4   AST(SGOT) Latest Ref Range: 12 - 45 U/L 12   ALT(SGPT) Latest Ref Range: 2 - 50 U/L 7   Alkaline Phosphatase Latest Ref Range: 30 - 99 U/L 74   Total Bilirubin Latest Ref Range: 0.1 - 1.5 mg/dL 0.3   Albumin Latest Ref Range: 3.2 - 4.9 g/dL 4.2   Total Protein Latest Ref Range: 6.0 - 8.2 g/dL 7.6   Globulin Latest Ref Range: 1.9 - 3.5 g/dL 3.4   A-G Ratio Latest Units: g/dL 1.2   PT Latest Ref Range: 12.0 - 14.6 sec 13.3   INR Latest Ref Range: 0.87 - 1.13  1.00   APTT Latest Ref Range: 24.7 - 36.0 sec 30.9       Imaging:     Assessment/Plan:  Migraine without status migrainosus, not intractable  23 year old female with pmhx of heterozygous MTHFR gene mutation and chronic migraine with aura in addition to tension type headaches. Migraines are affecting her approximately 6 days out of every month at this point despite. She has tried multiple agents including Topiramate, propranolol, Effexor, Excedrine, and Imitrex all without relief. We discussed multiple agents today and she would like to try Aimovig. We discussed side effects of constipation and injection site redness. I spent time showing her the injection device in the exam room and teaching her how to self-inject. She  will apply for the free drug program and if effective for her, we will submit to her insurance for approval.     Plan:  1. Aimovig 70mg sq monthly     Paresthesias  Bilateral upper and lower extremity dysesthesias x 2 weeks with hyperreflexia on exam. She had an episode of bilateral lower extremity weakness in 2012 which was investigated only with lumbar spine MRI imaging. I would like to check imaging of the brain, cervical, and thoracic spine to rule out demyelinating disease given these concerning events.     Plan:  1. Check brain, c-spine and t-spine MRI imaging.       Greater than 50% of this 60 minute face to face encounter was devoted to disease state counseling on migraines and dysesthesias and coordination of care.  Please see above assessment and plan for discussion.       Natalia Bermudez D.O., M.P.H  MS specialist.   Board Certified Neurologist.  Neurology Clerkship Director, [facility].    Neurology,  [facility].   Tel: [phone]  Fax: [phone]

## 2019-02-14 NOTE — ASSESSMENT & PLAN NOTE
23 year old female with pmhx of heterozygous MTHFR gene mutation and chronic migraine with aura in addition to tension type headaches. Migraines are affecting her approximately 6 days out of every month at this point despite. She has tried multiple agents including Topiramate, propranolol, Effexor, Excedrine, and Imitrex all without relief. We discussed multiple agents today and she would like to try Aimovig. We discussed side effects of constipation and injection site redness. I spent time showing her the injection device in the exam room and teaching her how to self-inject. She will apply for the free drug program and if effective for her, we will submit to her insurance for approval.     Plan:  1. Aimovig 70mg sq monthly

## 2019-02-14 NOTE — ASSESSMENT & PLAN NOTE
Bilateral upper and lower extremity dysesthesias x 2 weeks with hyperreflexia on exam. She had an episode of bilateral lower extremity weakness in 2012 which was investigated only with lumbar spine MRI imaging. I would like to check imaging of the brain, cervical, and thoracic spine to rule out demyelinating disease given these concerning events.     Plan:  1. Check brain, c-spine and t-spine MRI imaging.

## 2019-03-08 ENCOUNTER — HOSPITAL ENCOUNTER (OUTPATIENT)
Facility: MEDICAL CENTER | Age: 24
End: 2019-03-08
Attending: PHYSICIAN ASSISTANT
Payer: COMMERCIAL

## 2019-03-08 ENCOUNTER — OFFICE VISIT (OUTPATIENT)
Dept: URGENT CARE | Facility: PHYSICIAN GROUP | Age: 24
End: 2019-03-08
Payer: COMMERCIAL

## 2019-03-08 VITALS
SYSTOLIC BLOOD PRESSURE: 114 MMHG | HEART RATE: 80 BPM | BODY MASS INDEX: 28.72 KG/M2 | TEMPERATURE: 98.8 F | DIASTOLIC BLOOD PRESSURE: 64 MMHG | HEIGHT: 67 IN | WEIGHT: 183 LBS | OXYGEN SATURATION: 98 % | RESPIRATION RATE: 16 BRPM

## 2019-03-08 DIAGNOSIS — N89.8 VAGINAL ITCHING: ICD-10-CM

## 2019-03-08 DIAGNOSIS — N89.8 VAGINAL DISCHARGE: ICD-10-CM

## 2019-03-08 PROCEDURE — 87660 TRICHOMONAS VAGIN DIR PROBE: CPT

## 2019-03-08 PROCEDURE — 87491 CHLMYD TRACH DNA AMP PROBE: CPT

## 2019-03-08 PROCEDURE — 99214 OFFICE O/P EST MOD 30 MIN: CPT | Performed by: PHYSICIAN ASSISTANT

## 2019-03-08 PROCEDURE — 87510 GARDNER VAG DNA DIR PROBE: CPT

## 2019-03-08 PROCEDURE — 87591 N.GONORRHOEAE DNA AMP PROB: CPT

## 2019-03-08 PROCEDURE — 87480 CANDIDA DNA DIR PROBE: CPT

## 2019-03-08 RX ORDER — FLUCONAZOLE 150 MG/1
150 TABLET ORAL DAILY
Qty: 1 TAB | Refills: 1 | Status: SHIPPED | OUTPATIENT
Start: 2019-03-08 | End: 2019-03-10

## 2019-03-08 ASSESSMENT — ENCOUNTER SYMPTOMS
ABDOMINAL PAIN: 0
VOMITING: 0
HEADACHES: 0
FEVER: 0
FLANK PAIN: 0
CHILLS: 0
NAUSEA: 0
FATIGUE: 0

## 2019-03-09 LAB
CANDIDA DNA VAG QL PROBE+SIG AMP: POSITIVE
G VAGINALIS DNA VAG QL PROBE+SIG AMP: POSITIVE
T VAGINALIS DNA VAG QL PROBE+SIG AMP: NEGATIVE

## 2019-03-09 NOTE — PROGRESS NOTES
"Subjective:   Kelvin Malagon is a 23 y.o. female who presents for Vaginal Itching (vaginal itching & pain x 4days)        LMP: 2/16/19  No prior yeast infections.  Denies prior yeast infection and STDs.  Pt is sexually active, male partner.  Condoms for birth control.  Denies any chance that she could be pregnant.      Vaginal Itching   This is a new problem. The current episode started in the past 7 days (4 days). The problem occurs constantly. The problem has been gradually worsening. Pertinent negatives include no abdominal pain, chills, fatigue, fever, headaches, nausea or vomiting. Nothing aggravates the symptoms. She has tried nothing for the symptoms.     Review of Systems   Constitutional: Negative for chills, fatigue and fever.   Gastrointestinal: Negative for abdominal pain, nausea and vomiting.   Genitourinary: Negative for dysuria, flank pain, frequency, hematuria and urgency.   Neurological: Negative for headaches.       PMH:  has a past medical history of Allergy; Anxiety; ASTHMA; Clotting disorder (HCC); Depression; IBS (irritable bowel syndrome); and Migraine. She also has no past medical history of Diabetes.  MEDS:   Current Outpatient Prescriptions:   •  fluconazole (DIFLUCAN) 150 MG tablet, Take 1 Tab by mouth every day for 2 days., Disp: 1 Tab, Rfl: 1  •  Cranberry 1000 MG Cap, Take  by mouth., Disp: , Rfl:   •  HOMEOPATHIC PRODUCTS PO, Take  by mouth., Disp: , Rfl:   •  venlafaxine XR (EFFEXOR-XR) 150 MG extended-release capsule, Take 1 Cap by mouth every day., Disp: 30 Cap, Rfl: 5  •  diazePAM (VALIUM) 5 MG Tab, Take 1 Tab by mouth every 6 hours as needed for Anxiety for up to 1 dose., Disp: 1 Tab, Rfl: 0  •  EPINEPHrine HCl (EPINEPHRINE 1 MG/ML,1:1000,) 1 MG/ML Solution, 0.5 mg by Intramuscular route Once., Disp: , Rfl:   ALLERGIES:   Allergies   Allergen Reactions   • Bactrim    • Broccoli [Brassica Oleracea Italica]      \"upset stomach\"   • Crab      \"upset stomach\"   • Tuna      \"upset " "stomach\"   • Pcn [Penicillins]      Urticarial rash     SURGHX:   Past Surgical History:   Procedure Laterality Date   • KNEE ARTHROSCOPY  1/8/2009    Performed by SHAD CORDOBA at SURGERY [facility]   • SYNOVECTOMY  1/8/2009    Performed by SHAD CORDOBA at SURGERY [facility]     SOCHX:  reports that she has never smoked. She has never used smokeless tobacco. She reports that she drinks alcohol. She reports that she does not use drugs.  FH: Family history was reviewed, no pertinent findings to report   Objective:   /64 (BP Location: Left arm, Patient Position: Sitting, BP Cuff Size: Adult)   Pulse 80   Temp 37.1 °C (98.8 °F) (Temporal)   Resp 16   Ht 1.689 m (5' 6.5\")   Wt 83 kg (183 lb)   LMP 02/16/2019   SpO2 98%   BMI 29.09 kg/m²   Physical Exam   Constitutional: She appears well-developed and well-nourished.  Non-toxic appearance. No distress.   HENT:   Head: Normocephalic and atraumatic.   Right Ear: External ear normal.   Left Ear: External ear normal.   Nose: Nose normal.   Neck: Neck supple.   Pulmonary/Chest: Effort normal. No respiratory distress.   Abdominal: Soft. There is no tenderness. There is no rigidity, no rebound, no guarding and no CVA tenderness.   Genitourinary: No labial fusion.   Genitourinary Comments: Patient examined in lithotomy position.  Labia pink and healthy.  No lesions.  Vaginal tissues pink, healthy, nontender.  There is copious white discharge.  No cervical motion tenderness, discharge, friability.   Neurological: She is alert.   Skin: Skin is warm and dry. Capillary refill takes less than 2 seconds.   Psychiatric: She has a normal mood and affect. Her speech is normal and behavior is normal. Judgment and thought content normal. Cognition and memory are normal.   Vitals reviewed.        Assessment/Plan:   1. Vaginal itching  - VAGINAL PATHOGENS DNA PANEL; Future  - fluconazole (DIFLUCAN) 150 MG tablet; Take 1 Tab by mouth every day for 2 days.  " Dispense: 1 Tab; Refill: 1    2. Vaginal discharge  - fluconazole (DIFLUCAN) 150 MG tablet; Take 1 Tab by mouth every day for 2 days.  Dispense: 1 Tab; Refill: 1    Physical exam and patient symptoms suggestive of yeast infection.  Patient started on single dose of Diflucan.  Vaginal pathogen swab and GC obtained.  I will contact patient with results of any to adjust treatment plan.    Patient symptoms worsen or fail to fully resolve she is instructed to return to clinic for reevaluation.    Differential diagnosis, natural history, supportive care, and indications for immediate follow-up discussed.

## 2019-03-10 ENCOUNTER — TELEPHONE (OUTPATIENT)
Dept: URGENT CARE | Facility: PHYSICIAN GROUP | Age: 24
End: 2019-03-10

## 2019-03-10 DIAGNOSIS — B96.89 BACTERIAL VAGINOSIS: ICD-10-CM

## 2019-03-10 DIAGNOSIS — N76.0 BACTERIAL VAGINOSIS: ICD-10-CM

## 2019-03-10 DIAGNOSIS — B37.31 CANDIDA VAGINITIS: ICD-10-CM

## 2019-03-10 LAB
C TRACH DNA SPEC QL NAA+PROBE: NEGATIVE
N GONORRHOEA DNA SPEC QL NAA+PROBE: NEGATIVE
SPECIMEN SOURCE: NORMAL

## 2019-03-10 RX ORDER — FLUCONAZOLE 150 MG/1
150 TABLET ORAL DAILY
Qty: 1 TAB | Refills: 0 | Status: SHIPPED | OUTPATIENT
Start: 2019-03-10 | End: 2019-06-05

## 2019-03-10 RX ORDER — METRONIDAZOLE 500 MG/1
500 TABLET ORAL 2 TIMES DAILY
Qty: 14 TAB | Refills: 0 | Status: SHIPPED | OUTPATIENT
Start: 2019-03-10 | End: 2019-03-17

## 2019-03-10 NOTE — TELEPHONE ENCOUNTER
Contacted patient with lab results and sent medications to the pharmacy.  If patient's symptoms worsen or fail to improve she is instructed to return to clinic for reevaluation.

## 2019-03-26 ENCOUNTER — HOSPITAL ENCOUNTER (OUTPATIENT)
Dept: RADIOLOGY | Facility: MEDICAL CENTER | Age: 24
End: 2019-03-26
Attending: PSYCHIATRY & NEUROLOGY
Payer: COMMERCIAL

## 2019-03-26 DIAGNOSIS — R29.2 HYPERREFLEXIA: ICD-10-CM

## 2019-03-26 DIAGNOSIS — G89.29 CHRONIC NONINTRACTABLE HEADACHE, UNSPECIFIED HEADACHE TYPE: ICD-10-CM

## 2019-03-26 DIAGNOSIS — R51.9 CHRONIC NONINTRACTABLE HEADACHE, UNSPECIFIED HEADACHE TYPE: ICD-10-CM

## 2019-03-26 DIAGNOSIS — R20.8 DYSESTHESIA AFFECTING BOTH SIDES OF BODY: ICD-10-CM

## 2019-03-26 PROCEDURE — 72157 MRI CHEST SPINE W/O & W/DYE: CPT

## 2019-03-26 PROCEDURE — 70553 MRI BRAIN STEM W/O & W/DYE: CPT

## 2019-03-26 PROCEDURE — 72156 MRI NECK SPINE W/O & W/DYE: CPT

## 2019-03-26 PROCEDURE — 700117 HCHG RX CONTRAST REV CODE 255: Performed by: PSYCHIATRY & NEUROLOGY

## 2019-03-26 PROCEDURE — A9585 GADOBUTROL INJECTION: HCPCS | Performed by: PSYCHIATRY & NEUROLOGY

## 2019-03-26 RX ORDER — GADOBUTROL 604.72 MG/ML
7.5 INJECTION INTRAVENOUS ONCE
Status: COMPLETED | OUTPATIENT
Start: 2019-03-26 | End: 2019-03-26

## 2019-03-26 RX ADMIN — GADOBUTROL 7.5 ML: 604.72 INJECTION INTRAVENOUS at 13:25

## 2019-04-16 ENCOUNTER — OFFICE VISIT (OUTPATIENT)
Dept: NEUROLOGY | Facility: MEDICAL CENTER | Age: 24
End: 2019-04-16
Payer: COMMERCIAL

## 2019-04-16 VITALS
OXYGEN SATURATION: 95 % | SYSTOLIC BLOOD PRESSURE: 126 MMHG | HEART RATE: 76 BPM | WEIGHT: 178 LBS | DIASTOLIC BLOOD PRESSURE: 78 MMHG | BODY MASS INDEX: 28.61 KG/M2 | TEMPERATURE: 97.8 F | HEIGHT: 66 IN

## 2019-04-16 DIAGNOSIS — R20.2 PARESTHESIAS: ICD-10-CM

## 2019-04-16 DIAGNOSIS — G43.119 INTRACTABLE MIGRAINE WITH AURA WITHOUT STATUS MIGRAINOSUS: ICD-10-CM

## 2019-04-16 PROCEDURE — 99214 OFFICE O/P EST MOD 30 MIN: CPT | Performed by: PSYCHIATRY & NEUROLOGY

## 2019-04-16 RX ORDER — SUMATRIPTAN 50 MG/1
50 TABLET, FILM COATED ORAL
Qty: 10 TAB | Refills: 2 | Status: SHIPPED | OUTPATIENT
Start: 2019-04-16 | End: 2019-04-18 | Stop reason: SDUPTHER

## 2019-04-16 NOTE — PROGRESS NOTES
CC: Migraine with Aura and Dysesthesias      HPI:    Kelvin Malagon is a very pleasant 23 y.o. female who presents today in neurologic follow-up for migraine headaches and dysesthesias.  She is unaccompanied to today's visit.    Since her previous visit Ms. Malagon underwent MRI imaging which was ordered because of her ongoing headaches and numbness and tingling in her bilateral hands and feet.  Her symptoms have continued since her previous visit, relatively unchanged.    She has migraine symptoms 4-5 days a week.  She typically gets an aura of light sensitivity and dizziness, her legs begin to feel shaky and she feels more irritable.  She usually takes Excedrin Migraine at the onset of these symptoms which sometimes prevents the headache and other times does not.  When a migraine does develop, the symptoms can last up to 18 hours, sometimes lasting into the following day.  She actually developed a migraine while she was undergoing her MRI.    She is tried multiple different medications including Topiramate, propranolol, Effexor, Excedrine, and Imitrex all without relief.    The numbness and tingling in her hands and feet has been going on for quite some time.  When it developed, she had been titrating her Effexor.  She had gone from the 75 mg dose to the 150 mg dose.  The symptoms do not seem positional in nature.  She denies any sensation of electricity in her back with neck flexion.      ROS:   Constitutional: No fevers or chills.  Eyes: No blurry vision or eye pain.  ENT: No dysphagia or hearing loss.  Respiratory: No cough or shortness of breath.  Cardiovascular: No chest pain or palpitations.  GI: No nausea, vomiting, +constipation/diarrhea chronically due to IBS.  : No urinary incontinence or dysuria.  Musculoskeletal: No joint swelling or arthralgias.  Skin: No skin rashes.  Neuro: + headaches, denies dizziness, or tremors.  Endocrine: No heat or cold intolerance. No polydipsia or polyuria.  Psych: No  "depression ,+ anxiety.  Heme/Lymph: No easy bruising or swollen lymph nodes.      Past Medical History:   Past Medical History:   Diagnosis Date   • Allergy    • Anxiety    • ASTHMA     child -cifuentes.  controlled without meds since 6 yrs old   • Clotting disorder (HCC)     patient unsure of the specifics (MTHFR) - heterozygous carrier    • Depression    • IBS (irritable bowel syndrome)    • Migraine        Past Surgical History:   Past Surgical History:   Procedure Laterality Date   • KNEE ARTHROSCOPY  1/8/2009    Performed by SHAD CORDOBA at SURGERY [facility]   • SYNOVECTOMY  1/8/2009    Performed by SHAD CORDOBA at SURGERY [facility]       Social History:   Social History     Social History   • Marital status: Single     Spouse name: N/A   • Number of children: N/A   • Years of education: N/A     Occupational History   • Not on file.     Social History Main Topics   • Smoking status: Never Smoker   • Smokeless tobacco: Never Used   • Alcohol use Yes      Comment: Occ   • Drug use: No   • Sexual activity: Yes     Birth control/ protection: Condom     Other Topics Concern   • Not on file     Social History Narrative   • No narrative on file       Family History:   Family History   Problem Relation Age of Onset   • Non-contributory Mother    • Other Mother         MTHFR homozygous, migraine   • Non-contributory Father    • Alcohol/Drug Father    • Anxiety disorder Sister    • Other Brother         migraine   • Thyroid Maternal Grandmother         pernicious anemia       Allergies:   Allergies   Allergen Reactions   • Bactrim    • Broccoli [Brassica Oleracea Italica]      \"upset stomach\"   • Crab      \"upset stomach\"   • Tuna      \"upset stomach\"   • Pcn [Penicillins]      Urticarial rash       Physical Exam:     Ambulatory Vitals  Encounter Vitals  Temperature: 36.6 °C (97.8 °F)  Temp src: Temporal  Blood Pressure: 126/78  Pulse: 76  Pulse Oximetry: 95 %  Weight: 80.7 kg (178 lb)  Height: 167.6 " "cm (5' 6\")  BMI (Calculated): 28.73    Constitutional: Well-developed, well-nourished, good hygiene. Appears stated age.  Cardiovascular: RRR, with no murmurs, rubs or gallops. No carotid bruits.   Respiratory: Lungs CTA B/L, no W/R/R.   Abdomen: Soft Non-tender to Palpation. Non-distended.  Extremities: No peripheral edema, pedal pulses intact.   Skin: Warm, dry, intact. No rashes observed.  Eyes: Sclera anicteric   Funduscopic: Optic discs flat with no evidence of papilledema or pallor.   Neurologic:   Mental Status: Awake, alert, oriented x 3.   Speech: Fluent with normal prosody.   Memory: Able to recall recent and remote events accurately.    Concentration: Attentive. Able to focus on history and follow multi-step commands.              Fund of Knowledge: Appropriate   Cranial Nerves:    CN II: PERRL. No afferent pupillary defect.    CN III, IV, VI: Good eye closure, EOMI.     CN V: Facial sensation intact and symmetric.     CN VII: No facial asymmetry.     CN VIII: Hearing intact.     CN IX and X: Palate elevates symmetrically. Normal gag reflex.    CN XI: Symmetric shoulder shrug.     CN XII: Tongue midline.    Sensory: Intact light touch, vibration and temperature.    Coordination: No evidence of past-pointing on finger to nose testing, no dysdiadochokinesia. Heel to shin intact.    DTR's: 3+ throughout without clonus.    Babinski: Toes equivocal bilaterally.   Romberg: Negative.   Movements: No tremors observed.   Musculoskeletal:    Strength: 5/5 in upper and lower extremities bilaterally.   Gait: Steady, narrow based.    Tone: Normal bulk and tone.   Joints: No swelling.     Imaging:   Today I reviewed the patient's most recent MRI images with her in the examination room. I explained basic terminology of MRI's, verbalized my assessment, and answered her questions.     MRI Brain w/wo contrast from March 26, 2019  1.  Small solitary focus of increased T2 and FLAIR signal in the LEFT frontal white matter of " uncertain significance.  Demyelinating process is not entirely excluded.  2.  No focal abnormal enhancement within the brain.  3.  No acute stroke or evidence for hemorrhage.       MRI C-Spine w/wo contrast from March 26, 2019  1.  Unremarkable MRI cervical spine.  2.  No abnormal signal within the cervical cord to indicate demyelinating process.  3.  No mass or abnormal enhancement within cervical cord.  4.  Motion artifact limits exam.     MRI of the thoracic spine with and without contrast from March 26, 2019  1.  Unremarkable MR thoracic spine.  2.  No evidence for edema or demyelination within the thoracic cord.      Assessment/Plan:  Intractable migraine with aura without status migrainosus  Ms. Kelvin Malagon is a 23 year old female with pmhx of heterozygous MTHFR gene mutation and chronic migraine with aura in addition to tension type headaches.  She has migraine symptoms at least 4-5 days a week.  Previously she had reported 6 migraine days a month, but these are days when she develops a migraine despite taking medication to prevent aura and migraine symptoms from developing into a full blown headache.  She has tried multiple agents including Topiramate, propranolol, Effexor, Excedrine, and Imitrex all without relief.     Due to insurance issues, she was unable to obtain Aimovig. Today we discussed applying for Botox for migraine prevention. I explained that Botox is usually injected every 3 months and that it may require more than one cycle to see full benefit. She is agreeable to try this.     We reviewed her brain MRI at today's visit.  There is one tiny punctate T2 FLAIR hyperintense lesion which is entirely nonspecific and does not exhibit morphology concerning for multiple sclerosis.  It may in fact have developed because of her migraines.     Plan:  1. Botox start form filled out at today's visit.     Paresthesias  Ms. Malagon had reported bilateral upper and lower extremity dysesthesias and was noted to  have hyperreflexia on her physical exam at her previous visit. She had an episode of bilateral lower extremity weakness in 2012 which was investigated only with lumbar spine MRI imaging.  Today we reviewed her recent cervical and thoracic spine MRIs which did not show any evidence of demyelination or central process to explain the symptoms.  It is possible that the paresthesias she is been experiencing in her hands and feet are medication side effect from her Effexor.  She was actively titrating the dose when the symptoms developed.  She understandably does not want to discontinue the medication as it has helped her anxiety considerably.  I recommended that she discuss with Dr. Mora and they can perhaps find an alternative agent.     Plan:  1. She will discuss tapering off Effexor with Dr. Mora and perhaps try an alternative agent such as Lexapro or Cymbalta which may help with her mood/anxiety.       Natalia Bermudez D.O., M.P.H  MS specialist.   Board Certified Neurologist.  Neurology Clerkship Director, [facility].    Neurology,  [facility].   Tel: [phone]  Fax: [phone]

## 2019-04-18 ENCOUNTER — APPOINTMENT (OUTPATIENT)
Dept: MEDICAL GROUP | Facility: LAB | Age: 24
End: 2019-04-18
Payer: COMMERCIAL

## 2019-04-18 PROBLEM — G43.119 INTRACTABLE MIGRAINE WITH AURA WITHOUT STATUS MIGRAINOSUS: Status: ACTIVE | Noted: 2019-04-18

## 2019-04-18 RX ORDER — SUMATRIPTAN 50 MG/1
50 TABLET, FILM COATED ORAL
Qty: 10 TAB | Refills: 2 | Status: SHIPPED | OUTPATIENT
Start: 2019-04-18 | End: 2022-06-08

## 2019-04-18 NOTE — ASSESSMENT & PLAN NOTE
Ms. Kelvin Malagon is a 23 year old female with pmhx of heterozygous MTHFR gene mutation and chronic migraine with aura in addition to tension type headaches.  She has migraine symptoms at least 4-5 days a week.  Previously she had reported 6 migraine days a month, but these are days when she develops a migraine despite taking medication to prevent aura and migraine symptoms from developing into a full blown headache.  She has tried multiple agents including Topiramate, propranolol, Effexor, Excedrine, and Imitrex all without relief.     Due to insurance issues, she was unable to obtain Aimovig. Today we discussed applying for Botox for migraine prevention. I explained that Botox is usually injected every 3 months and that it may require more than one cycle to see full benefit. She is agreeable to try this.     We reviewed her brain MRI at today's visit.  There is one tiny punctate T2 FLAIR hyperintense lesion which is entirely nonspecific and does not exhibit morphology concerning for multiple sclerosis.  It may in fact have developed because of her migraines.     Plan:  1. Botox start form filled out at today's visit.

## 2019-04-18 NOTE — ASSESSMENT & PLAN NOTE
Ms. Malagon had reported bilateral upper and lower extremity dysesthesias and was noted to have hyperreflexia on her physical exam at her previous visit. She had an episode of bilateral lower extremity weakness in 2012 which was investigated only with lumbar spine MRI imaging.  Today we reviewed her recent cervical and thoracic spine MRIs which did not show any evidence of demyelination or central process to explain the symptoms.  It is possible that the paresthesias she is been experiencing in her hands and feet are medication side effect from her Effexor.  She was actively titrating the dose when the symptoms developed.  She understandably does not want to discontinue the medication as it has helped her anxiety considerably.  I recommended that she discuss with Dr. Mora and they can perhaps find an alternative agent.     Plan:  1. She will discuss tapering off Effexor with Dr. Mora and perhaps try an alternative agent such as Lexapro or Cymbalta which may help with her mood/anxiety.

## 2019-04-24 DIAGNOSIS — G43.709 CHRONIC MIGRAINE W/O AURA W/O STATUS MIGRAINOSUS, NOT INTRACTABLE: ICD-10-CM

## 2019-06-05 ENCOUNTER — OFFICE VISIT (OUTPATIENT)
Dept: NEUROLOGY | Facility: MEDICAL CENTER | Age: 24
End: 2019-06-05
Payer: COMMERCIAL

## 2019-06-05 VITALS
SYSTOLIC BLOOD PRESSURE: 110 MMHG | BODY MASS INDEX: 28.61 KG/M2 | HEIGHT: 66 IN | RESPIRATION RATE: 16 BRPM | HEART RATE: 85 BPM | OXYGEN SATURATION: 98 % | WEIGHT: 178 LBS | DIASTOLIC BLOOD PRESSURE: 60 MMHG

## 2019-06-05 PROCEDURE — 64615 CHEMODENERV MUSC MIGRAINE: CPT | Performed by: NURSE PRACTITIONER

## 2019-06-05 RX ORDER — ZOLMITRIPTAN 5 MG/1
TABLET, ORALLY DISINTEGRATING ORAL
Qty: 9 TAB | Refills: 5 | Status: SHIPPED | OUTPATIENT
Start: 2019-06-05 | End: 2019-11-04 | Stop reason: SDUPTHER

## 2019-06-24 ENCOUNTER — HOSPITAL ENCOUNTER (OUTPATIENT)
Dept: RADIOLOGY | Facility: MEDICAL CENTER | Age: 24
End: 2019-06-24
Attending: PHYSICIAN ASSISTANT
Payer: COMMERCIAL

## 2019-06-24 ENCOUNTER — OFFICE VISIT (OUTPATIENT)
Dept: URGENT CARE | Facility: PHYSICIAN GROUP | Age: 24
End: 2019-06-24
Payer: COMMERCIAL

## 2019-06-24 VITALS
BODY MASS INDEX: 31.34 KG/M2 | HEIGHT: 66 IN | OXYGEN SATURATION: 94 % | HEART RATE: 91 BPM | TEMPERATURE: 97.9 F | SYSTOLIC BLOOD PRESSURE: 132 MMHG | RESPIRATION RATE: 16 BRPM | WEIGHT: 195 LBS | DIASTOLIC BLOOD PRESSURE: 78 MMHG

## 2019-06-24 DIAGNOSIS — M79.672 LEFT FOOT PAIN: ICD-10-CM

## 2019-06-24 DIAGNOSIS — S90.32XA CONTUSION OF LEFT FOOT, INITIAL ENCOUNTER: ICD-10-CM

## 2019-06-24 PROCEDURE — 73630 X-RAY EXAM OF FOOT: CPT | Mod: LT

## 2019-06-24 PROCEDURE — 99214 OFFICE O/P EST MOD 30 MIN: CPT | Performed by: PHYSICIAN ASSISTANT

## 2019-06-24 RX ORDER — IBUPROFEN 200 MG
200 TABLET ORAL EVERY 6 HOURS PRN
COMMUNITY
End: 2021-03-10

## 2019-06-24 ASSESSMENT — ENCOUNTER SYMPTOMS: FALLS: 0

## 2019-07-17 ENCOUNTER — OFFICE VISIT (OUTPATIENT)
Dept: NEUROLOGY | Facility: MEDICAL CENTER | Age: 24
End: 2019-07-17
Payer: COMMERCIAL

## 2019-07-17 VITALS
HEART RATE: 90 BPM | TEMPERATURE: 97.8 F | SYSTOLIC BLOOD PRESSURE: 124 MMHG | BODY MASS INDEX: 30.7 KG/M2 | DIASTOLIC BLOOD PRESSURE: 76 MMHG | OXYGEN SATURATION: 97 % | HEIGHT: 66 IN | WEIGHT: 191 LBS

## 2019-07-17 DIAGNOSIS — F32.A MILD DEPRESSION: ICD-10-CM

## 2019-07-17 DIAGNOSIS — G43.119 INTRACTABLE MIGRAINE WITH AURA WITHOUT STATUS MIGRAINOSUS: ICD-10-CM

## 2019-07-17 DIAGNOSIS — M25.511 RIGHT SHOULDER PAIN, UNSPECIFIED CHRONICITY: ICD-10-CM

## 2019-07-17 PROCEDURE — 99213 OFFICE O/P EST LOW 20 MIN: CPT | Performed by: PSYCHIATRY & NEUROLOGY

## 2019-07-17 RX ORDER — DIVALPROEX SODIUM 250 MG/1
250 TABLET, DELAYED RELEASE ORAL 2 TIMES DAILY
Qty: 60 TAB | Refills: 2 | Status: SHIPPED | OUTPATIENT
Start: 2019-07-17 | End: 2019-10-15

## 2019-07-17 NOTE — PROGRESS NOTES
"CC: Migraine and dysesthesias.     HPI:    Ms. Kelvin Malagon is a 25 yo F who presents today with chief complaint of migraine and numbness/tingling in her hands. She was last seen 4/16/19.   She wakes up with headaches every day. These feel similar to her previous headaches. They will typicaly resolve after going to bed, helped by sleep. + Photophobia.   As soon as she lays down, they come back. She cannot lie on her right side. She gets pain shooting up the right side of her neck. She tried massaging the area, but it doesn't help. She tried Botox recently, but it had no effect on her headaches. She would like to try Acupuncture.     ROS:  All other ROS negative except that mentioned in the HPI.      Current Outpatient Medications:   •  zolmitriptan (ZOMIG-ZMT) 5 MG disintegrating tablet, Take 1/2-1 tablet po at onset of headache, may repeat dose in 2 hours if unrelieved.  Do not exceed more than 2 tablets in 24 hours., Disp: 9 Tab, Rfl: 5  •  SUMAtriptan (IMITREX) 50 MG Tab, Take 1 Tab by mouth Once PRN for Migraine for up to 90 doses., Disp: 10 Tab, Rfl: 2  •  Cranberry 1000 MG Cap, Take  by mouth., Disp: , Rfl:   •  HOMEOPATHIC PRODUCTS PO, Take  by mouth., Disp: , Rfl:   •  venlafaxine (EFFEXOR-XR) 150 MG extended-release capsule, TAKE 1 CAPSULE BY MOUTH EVERY DAY, Disp: 90 Cap, Rfl: 1  •  ibuprofen (MOTRIN) 200 MG Tab, Take 200 mg by mouth every 6 hours as needed., Disp: , Rfl:   •  EPINEPHrine HCl (EPINEPHRINE 1 MG/ML,1:1000,) 1 MG/ML Solution, 0.5 mg by Intramuscular route Once., Disp: , Rfl:     Physical Examination:  Ambulatory Vitals  Encounter Vitals  Temperature: 36.6 °C (97.8 °F)  Temp src: Temporal  Blood Pressure: 124/76  Pulse: 90  Pulse Oximetry: 97 %  Weight: 86.6 kg (191 lb)  Height: 167.6 cm (5' 6\")  BMI (Calculated): 30.83  Constitutional: Well-developed, well-nourished, good hygiene. Appears stated age.  Cardiovascular: RRR, with no murmurs, rubs or gallops. No carotid bruits.   Respiratory: " Lungs CTA B/L, no W/R/R.   Abdomen: Soft Non-tender to Palpation. Non-distended.  Extremities: No peripheral edema, pedal pulses intact.   Skin: Warm, dry, intact. No rashes observed.  Eyes: Sclera anicteric. No nystagmus observed.   Neurologic:   Mental Status: Awake and alert.    Speech: Speech is fluent with normal prosody.   .           Fund of Knowledge: Appropriate   Cranial Nerves:    CN II: Pupils are equal and react appropriately. No APD noted.    CN III, IV, VI: Good eye closure. Extraocular movements are intact.     CN V: Facial sensation is intact and symmetric.     CN VII: No evidence of facial droop.     CN VIII: Hearing is grossly intact.    CN IX and X: Palate elevates symmetrically.    CN XI: Shoulder shrug is symmetric.     CN XII: Tongue is midline.   Sensory: Sensation is intact to vibration and temperature.    Coordination: There is no discoordination detected with finger tapping or finger to nose testing.        DTR's: Reflexes are 2+ and symmetrical.   Babinski: Toes are downgoing bilaterally.    Romberg: Deferred.   Movements: No tremors noted.  Musculoskeletal:    Strength:   Deltoids      R 5/5 L 5/5  Biceps        R 5/5 L 5/5  Triceps       R 5/5 L 5/5  Wrist Ext    R 5/5 L 5/5  Finger Flex R 5/5 L 5/5  Finger Ext   R 5/5 L 5/5  Hip Flex      R 5/5 L 5/5  Knee Flex   R 5/5 L 5/5  Knee Ext    R 5/5 L 5/5  Ankle DF    R 5/5 L 5/5  Ankle PF    R 5/5 L 5/5   Gait: Gait is narrow based and steady.    Tone: Normal bulk and tone.    Joints: No joint swelling.   Psychiatric: Mood and affect are appropriate.       Assessment and Plan:     Intractable migraine with aura without status migrainosus  Ms. Kelvin Malagon is a 23 year old female with pmhx of heterozygous MTHFR gene mutation and chronic migraine with aura in addition to tension type headaches.  She has migraine symptoms at least 4-5 days a week.  Previously she had reported 6 migraine days a month, but these are days when she develops a  migraine despite taking medication to prevent aura and migraine symptoms from developing into a full blown headache.  She has tried multiple agents including Topiramate, propranolol, Effexor, Excedrine, and Imitrex all without relief. Insurance would not cover Aimovig. Botox has not seemed to help. Encouraged her to allow it some times before we consider it a failure.     Plan:  1. She will continue with Botox  2. Sumatriptan PRN. Side effects discussed including cardiovascular events.             Natalia Bermudez D.O., M.P.H  MS specialist.   Board Certified Neurologist.  Neurology Clerkship Director, [facility].    Neurology,  [facility].   Tel: [phone]  Fax: [phone]

## 2019-08-13 DIAGNOSIS — F32.A MILD DEPRESSION: ICD-10-CM

## 2019-08-13 DIAGNOSIS — G43.109 MIGRAINE WITH AURA AND WITHOUT STATUS MIGRAINOSUS, NOT INTRACTABLE: ICD-10-CM

## 2019-08-13 DIAGNOSIS — F41.9 ANXIETY: ICD-10-CM

## 2019-08-13 RX ORDER — VENLAFAXINE HYDROCHLORIDE 150 MG/1
CAPSULE, EXTENDED RELEASE ORAL
Qty: 90 CAP | Refills: 1 | Status: SHIPPED | OUTPATIENT
Start: 2019-08-13 | End: 2019-12-09

## 2019-08-13 NOTE — TELEPHONE ENCOUNTER
Was the patient seen in the last year in this department? Yes lov 2/13/19    Does patient have an active prescription for medications requested? No     Received Request Via: Pharmacy

## 2019-10-20 NOTE — ASSESSMENT & PLAN NOTE
Ms. Kelvin Malagon is a 23 year old female with pmhx of heterozygous MTHFR gene mutation and chronic migraine with aura in addition to tension type headaches.  She has migraine symptoms at least 4-5 days a week.  Previously she had reported 6 migraine days a month, but these are days when she develops a migraine despite taking medication to prevent aura and migraine symptoms from developing into a full blown headache.  She has tried multiple agents including Topiramate, propranolol, Effexor, Excedrine, and Imitrex all without relief. Insurance would not cover Aimovig. Botox has not seemed to help. Encouraged her to allow it some times before we consider it a failure.     Plan:  1. She will continue with Botox  2. Sumatriptan PRN. Side effects discussed including cardiovascular events.

## 2019-11-04 ENCOUNTER — OFFICE VISIT (OUTPATIENT)
Dept: MEDICAL GROUP | Facility: LAB | Age: 24
End: 2019-11-04
Payer: COMMERCIAL

## 2019-11-04 VITALS
TEMPERATURE: 98.9 F | OXYGEN SATURATION: 96 % | HEIGHT: 66 IN | RESPIRATION RATE: 12 BRPM | DIASTOLIC BLOOD PRESSURE: 64 MMHG | SYSTOLIC BLOOD PRESSURE: 118 MMHG | BODY MASS INDEX: 34.87 KG/M2 | WEIGHT: 217 LBS | HEART RATE: 96 BPM

## 2019-11-04 DIAGNOSIS — F32.A MILD DEPRESSION: ICD-10-CM

## 2019-11-04 DIAGNOSIS — Z23 NEED FOR VACCINATION: ICD-10-CM

## 2019-11-04 DIAGNOSIS — G43.119 INTRACTABLE MIGRAINE WITH AURA WITHOUT STATUS MIGRAINOSUS: ICD-10-CM

## 2019-11-04 DIAGNOSIS — Z00.00 PREVENTATIVE HEALTH CARE: ICD-10-CM

## 2019-11-04 DIAGNOSIS — R20.2 PARESTHESIAS: ICD-10-CM

## 2019-11-04 PROCEDURE — 90471 IMMUNIZATION ADMIN: CPT | Performed by: NURSE PRACTITIONER

## 2019-11-04 PROCEDURE — 90686 IIV4 VACC NO PRSV 0.5 ML IM: CPT | Performed by: NURSE PRACTITIONER

## 2019-11-04 PROCEDURE — 99214 OFFICE O/P EST MOD 30 MIN: CPT | Mod: 25 | Performed by: NURSE PRACTITIONER

## 2019-11-04 RX ORDER — DULOXETIN HYDROCHLORIDE 60 MG/1
60 CAPSULE, DELAYED RELEASE ORAL DAILY
Qty: 30 CAP | Refills: 5 | Status: SHIPPED | OUTPATIENT
Start: 2019-11-04 | End: 2020-03-06 | Stop reason: SDUPTHER

## 2019-11-04 RX ORDER — MELOXICAM 7.5 MG/1
15 TABLET ORAL DAILY
COMMUNITY
End: 2020-12-01

## 2019-11-04 RX ORDER — DIVALPROEX SODIUM 250 MG/1
250 TABLET, EXTENDED RELEASE ORAL DAILY
COMMUNITY
End: 2020-12-01

## 2019-11-04 RX ORDER — ZOLMITRIPTAN 5 MG/1
TABLET, ORALLY DISINTEGRATING ORAL
Qty: 9 TAB | Refills: 5 | Status: SHIPPED | OUTPATIENT
Start: 2019-11-04 | End: 2022-06-08

## 2019-11-04 RX ORDER — DIVALPROEX SODIUM 125 MG/1
TABLET, DELAYED RELEASE ORAL
Qty: 21 TAB | Refills: 0 | Status: SHIPPED | OUTPATIENT
Start: 2019-11-04 | End: 2020-12-01

## 2019-11-04 NOTE — PROGRESS NOTES
"CC  f/u    HPI   Laquita is a 24-year-old established female here with complaint of feeling exhausted all the time, tingling persists in extremities, headaches are persistent.  Sleeping 8-10 hours at night.  Depression is returning.  Wk: [employer] 5 days/week  Nonsmoker.    Drinks one glass of wine at night.   Saw Dr. Bermudez with neurology last 7/2019 - botox, topamax, propranolol, effexor, excedrine and imitrex have not been significantly helpful.  zomig helps to abort \" major migraines.\"  States that she has a generalized headache all the time.  Most recently was started on Depakote in July and tells me that this has not made a significant difference in her migraines and in fact she has gained a lot of weight.  She denies suicidal or homicidal ideations.  No new symptoms.    Past medical, surgical, family, and social history is reviewed and updated in Epic chart by me today.   Medications and allergies reviewed and updated in Epic chart by me today.     ROS:   As documented in history of present illness above    Exam:  /64 (BP Location: Left arm, Patient Position: Sitting, BP Cuff Size: Large adult)   Pulse 96   Temp 37.2 °C (98.9 °F)   Resp 12   Ht 1.676 m (5' 6\")   Wt 98.4 kg (217 lb)   SpO2 96%   Constitutional: Alert, no distress, plus 3 vital signs  Skin:  Warm, dry, no rashes invisible areas  Eye: Equal, round and reactive, conjunctiva clear  ENMT: Lips without lesions, good dentition, oropharynx clear    Neck: Trachea midline.  Respiratory: Unlabored respiration, lungs clear to auscultation, no wheezes, no rhonchi  Cardiovascular: Normal rate and rhythm.  Psych: Alert, pleasant, well-groomed, normal affect      Assessment / Plan / Medical Decision making:   \"  1. Intractable migraine with aura without status migrainosus  DULoxetine (CYMBALTA) 60 MG Cap DR Particles delayed-release capsule    divalproex (DEPAKOTE) 125 MG EC tablet    zolmitriptan (ZOMIG-ZMT) 5 MG disintegrating tablet   2. " "Paresthesias  FREE THYROXINE    TSH    VITAMIN B12   3. Mild depression (HCC)     4. Need for vaccination  Influenza Vaccine Quad Injection (PF)   5. Preventative health care  VITAMIN D,25 HYDROXY    CBC WITH DIFFERENTIAL   \"  Reviewed all previous imaging ordered by neurology such as MRI of her brain and spine.  She was given a written taper off of Depakote as that she has gained a significant amount of weight.  Refilled Zomig for abortive use.  Reviewed her last neurology consult note which suggested changing Effexor to Cymbalta in hopes of improving her depression, this was done today.  Discussed possible side effects of changing from Effexor to Cymbalta.  Recommend updated lab work.  Retried getting Aimovig approved and she will notify me regarding what happens with Aimovig approval when she gets to the pharmacy.  I will see her back here in 1 month.    "

## 2019-11-05 ENCOUNTER — TELEPHONE (OUTPATIENT)
Dept: MEDICAL GROUP | Facility: LAB | Age: 24
End: 2019-11-05

## 2019-11-05 NOTE — TELEPHONE ENCOUNTER
MEDICATION PRIOR AUTHORIZATION NEEDED:    1. Name of Medication: Erenumab (AIMOVIG)     2. Requested By (Name of Pharmacy): [facility]     3. Is insurance on file current? Yes     4. What is the name & phone number of the 3rd party payor? Please fill out par form and fax back to [phone]

## 2019-11-14 NOTE — TELEPHONE ENCOUNTER
[facility] is needing a new RX with clarification on the instructions.    Is this monthly?

## 2019-12-09 ENCOUNTER — APPOINTMENT (OUTPATIENT)
Dept: MEDICAL GROUP | Facility: LAB | Age: 24
End: 2019-12-09
Payer: COMMERCIAL

## 2019-12-09 RX ORDER — ESCITALOPRAM OXALATE 5 MG/1
5 TABLET ORAL DAILY
Qty: 30 TAB | Refills: 4 | Status: SHIPPED | OUTPATIENT
Start: 2019-12-09 | End: 2020-04-06 | Stop reason: SDUPTHER

## 2019-12-26 RX ORDER — TOBRAMYCIN 3 MG/ML
1 SOLUTION/ DROPS OPHTHALMIC EVERY 4 HOURS
Qty: 1 BOTTLE | Refills: 0 | Status: SHIPPED | OUTPATIENT
Start: 2019-12-26 | End: 2020-12-01

## 2019-12-31 ENCOUNTER — APPOINTMENT (OUTPATIENT)
Dept: RADIOLOGY | Facility: IMAGING CENTER | Age: 24
End: 2019-12-31
Attending: PHYSICIAN ASSISTANT
Payer: COMMERCIAL

## 2019-12-31 ENCOUNTER — OFFICE VISIT (OUTPATIENT)
Dept: URGENT CARE | Facility: PHYSICIAN GROUP | Age: 24
End: 2019-12-31
Payer: COMMERCIAL

## 2019-12-31 VITALS
SYSTOLIC BLOOD PRESSURE: 124 MMHG | OXYGEN SATURATION: 99 % | RESPIRATION RATE: 16 BRPM | TEMPERATURE: 98.2 F | BODY MASS INDEX: 35.84 KG/M2 | HEART RATE: 80 BPM | DIASTOLIC BLOOD PRESSURE: 78 MMHG | WEIGHT: 223 LBS | HEIGHT: 66 IN

## 2019-12-31 DIAGNOSIS — S63.622A SPRAIN OF INTERPHALANGEAL JOINT OF LEFT THUMB, INITIAL ENCOUNTER: ICD-10-CM

## 2019-12-31 DIAGNOSIS — S69.92XA HAND INJURY, LEFT, INITIAL ENCOUNTER: ICD-10-CM

## 2019-12-31 PROCEDURE — 73130 X-RAY EXAM OF HAND: CPT | Mod: TC,LT | Performed by: PHYSICIAN ASSISTANT

## 2019-12-31 PROCEDURE — 99214 OFFICE O/P EST MOD 30 MIN: CPT | Performed by: PHYSICIAN ASSISTANT

## 2019-12-31 ASSESSMENT — ENCOUNTER SYMPTOMS
WEAKNESS: 0
DIARRHEA: 0
SHORTNESS OF BREATH: 0
JOINT SWELLING: 1
VOMITING: 0
COUGH: 0
FEVER: 0
CONSTIPATION: 0
FATIGUE: 0
NUMBNESS: 0
NAUSEA: 0
CHILLS: 0
ABDOMINAL PAIN: 0

## 2019-12-31 NOTE — LETTER
December 31, 2019         Patient: Kelvin Malagon   YOB: 1995   Date of Visit: 12/31/2019           To Whom it May Concern:    Kelvin Malagon was seen in my clinic on 12/31/2019. She may return to work on 1/1/20.    If you have any questions or concerns, please don't hesitate to call.        Sincerely,           Silva Sow P.A.-C.  Electronically Signed

## 2020-01-01 NOTE — PROGRESS NOTES
Subjective:   Kelvin Malagon is a 24 y.o. female who presents for Finger Pain (Left thumb pain x 2 days)        Hand Injury   This is a new problem. Episode onset: L thumb  The problem occurs constantly. The problem has been unchanged. Associated symptoms include joint swelling. Pertinent negatives include no abdominal pain, chills, coughing, fatigue, fever, nausea, numbness, vomiting or weakness. She has tried acetaminophen and NSAIDs for the symptoms. The treatment provided mild relief.     Review of Systems   Constitutional: Negative for chills, fatigue, fever and malaise/fatigue.   Respiratory: Negative for cough and shortness of breath.    Gastrointestinal: Negative for abdominal pain, constipation, diarrhea, nausea and vomiting.   Musculoskeletal: Positive for joint pain (L thumb ) and joint swelling.   Neurological: Negative for weakness and numbness.   All other systems reviewed and are negative.      PMH:  has a past medical history of Allergy, Anxiety, ASTHMA, Clotting disorder (HCC), Depression, IBS (irritable bowel syndrome), and Migraine. She also has no past medical history of Diabetes.  MEDS:   Current Outpatient Medications:   •  tobramycin (TOBREX) 0.3 % Solution ophthalmic solution, Place 1 Drop in both eyes every 4 hours., Disp: 1 Bottle, Rfl: 0  •  escitalopram (LEXAPRO) 5 MG tablet, Take 1 Tab by mouth every day. In addition to duloxetine, Disp: 30 Tab, Rfl: 4  •  meloxicam (MOBIC) 7.5 MG Tab, Take 15 mg by mouth every day., Disp: , Rfl:   •  divalproex ER (DEPAKOTE ER) 250 MG TABLET SR 24 HR, Take 250 mg by mouth every day., Disp: , Rfl:   •  DULoxetine (CYMBALTA) 60 MG Cap DR Particles delayed-release capsule, Take 1 Cap by mouth every day., Disp: 30 Cap, Rfl: 5  •  divalproex (DEPAKOTE) 125 MG EC tablet, Take twice daily for two weeks, then once daily for a week, then stop., Disp: 21 Tab, Rfl: 0  •  zolmitriptan (ZOMIG-ZMT) 5 MG disintegrating tablet, Take 1/2-1 tablet po at onset of  "headache, may repeat dose in 2 hours if unrelieved.  Do not exceed more than 2 tablets in 24 hours., Disp: 9 Tab, Rfl: 5  •  ibuprofen (MOTRIN) 200 MG Tab, Take 200 mg by mouth every 6 hours as needed., Disp: , Rfl:   •  SUMAtriptan (IMITREX) 50 MG Tab, Take 1 Tab by mouth Once PRN for Migraine for up to 90 doses., Disp: 10 Tab, Rfl: 2  •  EPINEPHrine HCl (EPINEPHRINE 1 MG/ML,1:1000,) 1 MG/ML Solution, 0.5 mg by Intramuscular route Once., Disp: , Rfl:   ALLERGIES:   Allergies   Allergen Reactions   • Bactrim    • Broccoli [Brassica Oleracea Italica]      \"upset stomach\"   • Crab      \"upset stomach\"   • Tuna      \"upset stomach\"   • Pcn [Penicillins]      Urticarial rash     SURGHX:   Past Surgical History:   Procedure Laterality Date   • KNEE ARTHROSCOPY  1/8/2009    Performed by SHAD CORDOBA at SURGERY [facility]   • SYNOVECTOMY  1/8/2009    Performed by SHAD CORDOBA at SURGERY [facility]     SOCHX:  reports that she has never smoked. She has never used smokeless tobacco. She reports current alcohol use. She reports that she does not use drugs.  Family History   Problem Relation Age of Onset   • Non-contributory Mother    • Other Mother         MTHFR homozygous, migraine   • Non-contributory Father    • Alcohol/Drug Father    • Anxiety disorder Sister    • Other Brother         migraine   • Thyroid Maternal Grandmother         pernicious anemia        Objective:   /78   Pulse 80   Temp 36.8 °C (98.2 °F) (Temporal)   Resp 16   Ht 1.676 m (5' 6\")   Wt 101.2 kg (223 lb)   SpO2 99%   BMI 35.99 kg/m²     Physical Exam  Vitals signs reviewed.   Constitutional:       General: She is not in acute distress.     Appearance: She is well-developed.   HENT:      Head: Normocephalic and atraumatic.      Right Ear: External ear normal.      Left Ear: External ear normal.      Nose: Nose normal.      Mouth/Throat:      Mouth: Mucous membranes are moist.   Eyes:      Conjunctiva/sclera: " Conjunctivae normal.      Pupils: Pupils are equal, round, and reactive to light.   Neck:      Musculoskeletal: Normal range of motion and neck supple.      Trachea: No tracheal deviation.   Cardiovascular:      Rate and Rhythm: Normal rate and regular rhythm.   Pulmonary:      Effort: Pulmonary effort is normal.      Breath sounds: Normal breath sounds.   Musculoskeletal:      Left hand: She exhibits decreased range of motion and tenderness. She exhibits normal capillary refill. Normal sensation noted.        Hands:    Skin:     General: Skin is warm and dry.      Capillary Refill: Capillary refill takes less than 2 seconds.   Neurological:      General: No focal deficit present.      Mental Status: She is alert and oriented to person, place, and time.   Psychiatric:         Mood and Affect: Mood normal.         Behavior: Behavior normal.       FINDINGS:     BONE MINERALIZATION: Normal.  JOINTS: Preserved. No erosions.  FRACTURE: None.  DISLOCATION: None.  SOFT TISSUES: No mass.     IMPRESSION:     No acute osseous abnormality.      Assessment/Plan:     1. Sprain of interphalangeal joint of left thumb, initial encounter       Supportive care reviewed. Alternate tylenol and ibuprofen 600 mg TID. Pt placed in thumb spica. Rest, ice, elevate.     Follow-up with primary care provider within 7-10 days.  If symptoms worsen or persist patient can return to clinic for reevaluation. All side effects of medication discussed including allergic response, GI upset, tendon injury, etc. Patient confirmed understanding of information.    Please note that this dictation was created using voice recognition software. I have made every reasonable attempt to correct obvious errors, but I expect that there are errors of grammar and possibly content that I did not discover before finalizing the note.

## 2020-01-22 ENCOUNTER — APPOINTMENT (OUTPATIENT)
Dept: RADIOLOGY | Facility: IMAGING CENTER | Age: 25
End: 2020-01-22
Attending: NURSE PRACTITIONER
Payer: COMMERCIAL

## 2020-01-22 ENCOUNTER — OFFICE VISIT (OUTPATIENT)
Dept: URGENT CARE | Facility: PHYSICIAN GROUP | Age: 25
End: 2020-01-22
Payer: COMMERCIAL

## 2020-01-22 VITALS
TEMPERATURE: 98.6 F | HEIGHT: 66 IN | BODY MASS INDEX: 35.84 KG/M2 | SYSTOLIC BLOOD PRESSURE: 118 MMHG | WEIGHT: 223 LBS | RESPIRATION RATE: 16 BRPM | DIASTOLIC BLOOD PRESSURE: 76 MMHG | OXYGEN SATURATION: 98 % | HEART RATE: 74 BPM

## 2020-01-22 DIAGNOSIS — M25.571 ACUTE RIGHT ANKLE PAIN: ICD-10-CM

## 2020-01-22 PROCEDURE — 73610 X-RAY EXAM OF ANKLE: CPT | Mod: TC,RT | Performed by: NURSE PRACTITIONER

## 2020-01-22 PROCEDURE — 99214 OFFICE O/P EST MOD 30 MIN: CPT | Performed by: NURSE PRACTITIONER

## 2020-01-22 ASSESSMENT — ENCOUNTER SYMPTOMS
WEAKNESS: 0
FOCAL WEAKNESS: 0
TINGLING: 0
FALLS: 0
BACK PAIN: 0
SENSORY CHANGE: 0

## 2020-01-22 NOTE — LETTER
January 22, 2020       Patient: Kelvin Malagon   YOB: 1995   Date of Visit: 1/22/2020         To Whom It May Concern:    It is my medical opinion that Kelvin Malagon return to full duty, no restrictions on 01/24/2020              Sincerely,          KEESHA Saunders.  Electronically Signed

## 2020-01-22 NOTE — PROGRESS NOTES
"Subjective:      Laquita Malagon is a 24 y.o. female who presents with Ankle Pain (Rt ankle, top of foot X 2 weeks )    Reviewed past medical, surgical and family history. Reviewed prescription and OTC medications with patient in electronic health record today            Allergies   Allergen Reactions   • Albumin      Unknown knows through allergy testing  Other reaction(s): Unknown  Unknown knows through allergy testing   • Bactrim    • Broccoli [Brassica Oleracea Italica]      \"upset stomach\"   • Crab      \"upset stomach\"   • Sulfamethoxazole W-Trimethoprim    • Tuna      \"upset stomach\"   • Pcn [Penicillins]      Urticarial rash       HPI: Pt came in c/o R foot pain x2 weeks. Pt was hiking during a hunting trip 2 weeks ago and midway during the trip began to feel pain to top of her R foot close to her joint area. Pt described pain as dull/sharp, rates at 8/10. Pt states it is painful to flex and bear \"too much weight\" pt works walking \"a lot\" and works has been difficult due to the pain. Treatments used: RICE, tylenol and Ibuprofen with minimal relief. Pt denies any injury, fall, twisting of her R ankle since the pain began.     Review of Systems   Musculoskeletal: Negative for back pain, falls and joint pain.   Neurological: Negative for tingling, sensory change, focal weakness and weakness.          Objective:     /76   Pulse 74   Temp 37 °C (98.6 °F)   Resp 16   Ht 1.676 m (5' 6\")   Wt 101.2 kg (223 lb)   SpO2 98%   BMI 35.99 kg/m²      Physical Exam  Vitals signs and nursing note reviewed.   Constitutional:       General: She is not in acute distress.     Appearance: Normal appearance. She is not ill-appearing.   HENT:      Head: Normocephalic.   Neck:      Musculoskeletal: Normal range of motion and neck supple. No neck rigidity or muscular tenderness.   Cardiovascular:      Rate and Rhythm: Normal rate and regular rhythm.   Pulmonary:      Effort: Pulmonary effort is normal.      Breath sounds: " "Normal breath sounds. No wheezing or rales.   Musculoskeletal:         General: Tenderness (To top of R foot close to joint) present.   Neurological:      General: No focal deficit present.      Mental Status: She is alert and oriented to person, place, and time.   Psychiatric:         Thought Content: Thought content normal.         Judgment: Judgment normal.       Xray: Negative by my read.  Radiologist impression:       Narrative & Impression        1/22/2020 3:20 PM     HISTORY/REASON FOR EXAM:  Pain/Deformity Following Trauma.        TECHNIQUE/EXAM DESCRIPTION AND NUMBER OF VIEWS:  3 views of the  RIGHT ankle.     COMPARISON: None     FINDINGS:     MINERALIZATION: Mineralization is unremarkable for age.     INJURY: No acute fracture or gross malalignment is seen.     JOINTS: No erosive arthropathy is evident.  Ankle mortise is symmetric.           IMPRESSION:     No radiographic evidence of acute traumatic injury.             Last Resulted: 01/22/20  3:32 PM               Assessment/Plan:         OTC  analgesic of choice. Follow manufactures dosing and safety precautions.     \"P.R.I.C.E.\" discussed with pt (protection from further injury, rest, ice, compession and elevation).     Ice/ heat packs  Prn pain     Daily ankle ROM exercises encouraged.     FU prn or if symptoms are not improving to pt's satisfaction in 2-3 weeks.   Sooner if new or worsening symptoms.     "

## 2020-03-06 DIAGNOSIS — G43.119 INTRACTABLE MIGRAINE WITH AURA WITHOUT STATUS MIGRAINOSUS: ICD-10-CM

## 2020-03-06 RX ORDER — DULOXETIN HYDROCHLORIDE 60 MG/1
60 CAPSULE, DELAYED RELEASE ORAL DAILY
Qty: 30 CAP | Refills: 5 | Status: SHIPPED | OUTPATIENT
Start: 2020-03-06 | End: 2020-09-08

## 2020-04-06 RX ORDER — ESCITALOPRAM OXALATE 5 MG/1
5 TABLET ORAL DAILY
Qty: 30 TAB | Refills: 4 | Status: SHIPPED | OUTPATIENT
Start: 2020-04-06 | End: 2020-07-08

## 2020-07-08 RX ORDER — ESCITALOPRAM OXALATE 5 MG/1
5 TABLET ORAL DAILY
Qty: 90 TAB | Refills: 1 | Status: SHIPPED | OUTPATIENT
Start: 2020-07-08 | End: 2020-12-01 | Stop reason: SDUPTHER

## 2020-07-29 NOTE — TELEPHONE ENCOUNTER
Received request via: Pharmacy    Was the patient seen in the last year in this department? Yes  11/4/19  Does the patient have an active prescription (recently filled or refills available) for medication(s) requested? No  
Yes

## 2020-08-26 ENCOUNTER — TELEMEDICINE (OUTPATIENT)
Dept: MEDICAL GROUP | Facility: LAB | Age: 25
End: 2020-08-26
Payer: COMMERCIAL

## 2020-08-26 VITALS — HEIGHT: 66 IN | BODY MASS INDEX: 35.99 KG/M2

## 2020-08-26 DIAGNOSIS — N76.0 ACUTE VAGINITIS: ICD-10-CM

## 2020-08-26 PROCEDURE — 99214 OFFICE O/P EST MOD 30 MIN: CPT | Mod: 95,CR | Performed by: NURSE PRACTITIONER

## 2020-08-26 RX ORDER — FLUCONAZOLE 150 MG/1
150 TABLET ORAL ONCE
Qty: 2 TAB | Refills: 0 | Status: SHIPPED | OUTPATIENT
Start: 2020-08-26 | End: 2020-08-26

## 2020-08-26 RX ORDER — CLINDAMYCIN HYDROCHLORIDE 300 MG/1
300 CAPSULE ORAL 2 TIMES DAILY
Qty: 14 CAP | Refills: 0 | Status: SHIPPED | OUTPATIENT
Start: 2020-08-26 | End: 2020-12-01

## 2020-08-26 NOTE — PROGRESS NOTES
"Telemedicine: Established Patient   This evaluation was conducted via Zoom using secure and encrypted videoconferencing technology. The patient was in a private location in the state of [place].    The patient's identity was confirmed and verbal consent was obtained for this virtual visit.    Subjective:   CC:   Chief Complaint   Patient presents with   • Vaginal Discharge       Kelvin  is a 24 y.o. female presenting for evaluation and management of:    New onset sensation of vaginal swelling / itching / burning. Hx of BV - feels similar.  Denies vaginal odor.  Vaginal d/c is clear.    Symptoms present x a few days (3).  Symptoms are staying the same, not worsening.  Tried OTC azo yeast which helped a little bit.  Sexually active with .    Just changed soap.   Increase in sexual frequency over the past few weeks.  Denies abdominal pain.    Denies dysuria, hematuria, fevers or chills.  Denies any concerns about STDs.    ROS  As above in HPI, otherwise negative    Allergies   Allergen Reactions   • Albumin      Unknown knows through allergy testing  Other reaction(s): Unknown  Unknown knows through allergy testing   • Bactrim    • Broccoli [Brassica Oleracea Italica]      \"upset stomach\"   • Crab      \"upset stomach\"   • Sulfamethoxazole W-Trimethoprim    • Tuna      \"upset stomach\"   • Pcn [Penicillins]      Urticarial rash       Current medicines (including changes today)  Current Outpatient Medications   Medication Sig Dispense Refill   • escitalopram (LEXAPRO) 5 MG tablet TAKE 1 TAB BY MOUTH EVERY DAY. IN ADDITION TO DULOXETINE 90 Tab 1   • DULoxetine (CYMBALTA) 60 MG Cap DR Particles delayed-release capsule Take 1 Cap by mouth every day. 30 Cap 5   • tobramycin (TOBREX) 0.3 % Solution ophthalmic solution Place 1 Drop in both eyes every 4 hours. 1 Bottle 0   • meloxicam (MOBIC) 7.5 MG Tab Take 15 mg by mouth every day.     • divalproex ER (DEPAKOTE ER) 250 MG TABLET SR 24 HR Take 250 mg by mouth every day.   " "  • divalproex (DEPAKOTE) 125 MG EC tablet Take twice daily for two weeks, then once daily for a week, then stop. 21 Tab 0   • zolmitriptan (ZOMIG-ZMT) 5 MG disintegrating tablet Take 1/2-1 tablet po at onset of headache, may repeat dose in 2 hours if unrelieved.  Do not exceed more than 2 tablets in 24 hours. 9 Tab 5   • ibuprofen (MOTRIN) 200 MG Tab Take 200 mg by mouth every 6 hours as needed.     • SUMAtriptan (IMITREX) 50 MG Tab Take 1 Tab by mouth Once PRN for Migraine for up to 90 doses. 10 Tab 2   • EPINEPHrine HCl (EPINEPHRINE 1 MG/ML,1:1000,) 1 MG/ML Solution 0.5 mg by Intramuscular route Once.       No current facility-administered medications for this visit.        Patient Active Problem List    Diagnosis Date Noted   • Intractable migraine with aura without status migrainosus 04/18/2019   • Paresthesias 02/13/2019   • Anxiety 02/13/2019   • Mild depression (HCC) 02/13/2019   • Food allergy 09/22/2016   • Migraine without status migrainosus, not intractable 08/25/2016   • Lactose intolerance 07/25/2014   • Blood clotting disorder (HCC) 10/16/2013       Family History   Problem Relation Age of Onset   • Non-contributory Mother    • Other Mother         MTHFR homozygous, migraine   • Non-contributory Father    • Alcohol/Drug Father    • Anxiety disorder Sister    • Other Brother         migraine   • Thyroid Maternal Grandmother         pernicious anemia       She  has a past medical history of Allergy, Anxiety, ASTHMA, Clotting disorder (HCC), Depression, IBS (irritable bowel syndrome), and Migraine. She also has no past medical history of Diabetes.  She  has a past surgical history that includes knee arthroscopy (1/8/2009) and synovectomy (1/8/2009).       Objective:   Ht 1.676 m (5' 6\")   LMP 08/08/2020   BMI 35.99 kg/m²     Physical Exam    Assessment and Plan:   The following treatment plan was discussed:     1. Acute vaginitis  fluconazole (DIFLUCAN) 150 MG tablet    clindamycin (CLEOCIN) 300 MG Cap "     Discussed that her symptoms reflect yeast, related to the swelling, itching and overall vaginal discomfort.  Recommend trying fluconazole once and reevaluating her symptoms tomorrow.  If symptoms persist, agreed to treat her for bacterial vaginosis.  If overall symptoms persist beyond the next 3 to 4 days, encouraged her to come here in person next week for a pelvic exam and vaginal pathogens.    Side effects of all medications prescribed today were discussed with the patient including how to take the medications and proper dosage. Discussed repercussions of not taking the medications as prescribed. Instructed to call the office should she have any negative side effects or problems with the medications.      Follow-up: 1 week

## 2020-08-31 ENCOUNTER — PATIENT MESSAGE (OUTPATIENT)
Dept: MEDICAL GROUP | Facility: LAB | Age: 25
End: 2020-08-31

## 2020-08-31 DIAGNOSIS — N76.0 BV (BACTERIAL VAGINOSIS): ICD-10-CM

## 2020-08-31 DIAGNOSIS — B96.89 BV (BACTERIAL VAGINOSIS): ICD-10-CM

## 2020-09-01 RX ORDER — CLINDAMYCIN PHOSPHATE 20 MG/G
CREAM VAGINAL
Qty: 40 G | Refills: 0 | Status: SHIPPED | OUTPATIENT
Start: 2020-09-01 | End: 2020-12-01

## 2020-09-03 ENCOUNTER — OFFICE VISIT (OUTPATIENT)
Dept: MEDICAL GROUP | Facility: LAB | Age: 25
End: 2020-09-03
Payer: COMMERCIAL

## 2020-09-03 ENCOUNTER — HOSPITAL ENCOUNTER (OUTPATIENT)
Facility: MEDICAL CENTER | Age: 25
End: 2020-09-03
Attending: NURSE PRACTITIONER
Payer: COMMERCIAL

## 2020-09-03 VITALS
DIASTOLIC BLOOD PRESSURE: 70 MMHG | RESPIRATION RATE: 12 BRPM | OXYGEN SATURATION: 95 % | HEART RATE: 86 BPM | SYSTOLIC BLOOD PRESSURE: 118 MMHG | HEIGHT: 66 IN | TEMPERATURE: 97.6 F | BODY MASS INDEX: 36.48 KG/M2 | WEIGHT: 227 LBS

## 2020-09-03 DIAGNOSIS — Z12.4 SCREENING FOR MALIGNANT NEOPLASM OF CERVIX: ICD-10-CM

## 2020-09-03 DIAGNOSIS — Z01.419 ENCOUNTER FOR GYNECOLOGICAL EXAMINATION: ICD-10-CM

## 2020-09-03 DIAGNOSIS — M25.50 MULTIPLE JOINT PAIN: ICD-10-CM

## 2020-09-03 DIAGNOSIS — Z00.00 PREVENTATIVE HEALTH CARE: ICD-10-CM

## 2020-09-03 PROCEDURE — 99395 PREV VISIT EST AGE 18-39: CPT | Performed by: NURSE PRACTITIONER

## 2020-09-03 PROCEDURE — 88175 CYTOPATH C/V AUTO FLUID REDO: CPT

## 2020-09-03 PROCEDURE — 87624 HPV HI-RISK TYP POOLED RSLT: CPT

## 2020-09-03 ASSESSMENT — FIBROSIS 4 INDEX: FIB4 SCORE: 0.37

## 2020-09-03 NOTE — PROGRESS NOTES
Chief Complaint   Patient presents with   • Annual Exam     PAP       HPI:  Laquita is a 24 y.o.  female who presents for annual exam.  She has been treated with oral clindamycin since August 26 for symptoms of BV - which caused GI upset and was switched to vaginal clindamycin yesterday for symptoms of vaginitis that we discussed on zoom on August 26.  Tells me she never started vaginal clindamycin and all symptoms have resolved.  She is sexually active only with her .  She is also concerned that she has fibromyalgia.  She states that she has chronic multiple joint pain, muscle aches, Tylenol a time, migraines, IBS, struggles with her weight.  Last testing for autoimmune arthritis was in 2012, negative.  Regarding her health maintenance:   Last pap: 2016  Abnormal Pap hx: none  Periods: monthly, heavy / painful  Contraception:  condoms  Last Mammo:not applicable   Last colonoscopy:not applicable   Bone density test:N\A   Last Lab: due for follow up   Last Td:current   Influenza vaccination: Due  Hx STD''s: no   Regular exercise: yes      meds:   Current Outpatient Medications   Medication Sig Dispense Refill   • DULoxetine (CYMBALTA) 60 MG Cap DR Particles delayed-release capsule Take 1 Cap by mouth every day. 30 Cap 5   • zolmitriptan (ZOMIG-ZMT) 5 MG disintegrating tablet Take 1/2-1 tablet po at onset of headache, may repeat dose in 2 hours if unrelieved.  Do not exceed more than 2 tablets in 24 hours. 9 Tab 5   • ibuprofen (MOTRIN) 200 MG Tab Take 200 mg by mouth every 6 hours as needed.     • SUMAtriptan (IMITREX) 50 MG Tab Take 1 Tab by mouth Once PRN for Migraine for up to 90 doses. 10 Tab 2   • EPINEPHrine HCl (EPINEPHRINE 1 MG/ML,1:1000,) 1 MG/ML Solution 0.5 mg by Intramuscular route Once.     • clindamycin (CLEOCIN) 2 % vaginal cream Insert vaginally every night for 5 nights. 40 g 0   • clindamycin (CLEOCIN) 300 MG Cap Take 1 Cap by mouth 2 times a day. For BV 14 Cap 0   • escitalopram (LEXAPRO) 5 MG  tablet TAKE 1 TAB BY MOUTH EVERY DAY. IN ADDITION TO DULOXETINE 90 Tab 1   • tobramycin (TOBREX) 0.3 % Solution ophthalmic solution Place 1 Drop in both eyes every 4 hours. 1 Bottle 0   • meloxicam (MOBIC) 7.5 MG Tab Take 15 mg by mouth every day.     • divalproex ER (DEPAKOTE ER) 250 MG TABLET SR 24 HR Take 250 mg by mouth every day.     • divalproex (DEPAKOTE) 125 MG EC tablet Take twice daily for two weeks, then once daily for a week, then stop. 21 Tab 0     No current facility-administered medications for this visit.        Allergies: No Known Allergies    family:   Family History   Problem Relation Age of Onset   • Non-contributory Mother    • Other Mother         MTHFR homozygous, migraine   • Non-contributory Father    • Alcohol/Drug Father    • Anxiety disorder Sister    • Other Brother         migraine   • Thyroid Maternal Grandmother         pernicious anemia       social hx:   Social History     Socioeconomic History   • Marital status: Single     Spouse name: Not on file   • Number of children: Not on file   • Years of education: Not on file   • Highest education level: Not on file   Occupational History   • Not on file   Social Needs   • Financial resource strain: Not on file   • Food insecurity     Worry: Not on file     Inability: Not on file   • Transportation needs     Medical: Not on file     Non-medical: Not on file   Tobacco Use   • Smoking status: Never Smoker   • Smokeless tobacco: Never Used   Substance and Sexual Activity   • Alcohol use: Yes     Comment: Occ   • Drug use: No   • Sexual activity: Yes     Birth control/protection: Condom   Lifestyle   • Physical activity     Days per week: Not on file     Minutes per session: Not on file   • Stress: Not on file   Relationships   • Social connections     Talks on phone: Not on file     Gets together: Not on file     Attends [employer] service: Not on file     Active member of club or organization: Not on file     Attends meetings of clubs or  "organizations: Not on file     Relationship status: Not on file   • Intimate partner violence     Fear of current or ex partner: Not on file     Emotionally abused: Not on file     Physically abused: Not on file     Forced sexual activity: Not on file   Other Topics Concern   • Not on file   Social History Narrative   • Not on file       ROS:  See HPI  All other systems are reviewed and negative.    PHYSICAL EXAMINATION:  /70   Pulse 86   Temp 36.4 °C (97.6 °F)   Resp 12   Ht 1.676 m (5' 6\")   Wt 103 kg (227 lb)   SpO2 95%   General appearance: Overweight female, healthy, well developed, well nourished  Psych: alert, no distress, cooperative  Eyes: EOM's normal, pupils equal, round, reactive to light  ENT: Ears: external ears normal to inspection and palpation, TM's clear, Nose/Sinuses: nose shows no deformity, asymmetry, or inflammation  Neck: no asymmetry, masses, or scars, no adenopathy, thyroid normal to palpation  Lungs:chest symmetric with normal A/P diameter, no chest deformities noted, normal respiratory rate and rhythm  Cardiovascular:regular rate and rhythm, S1 normal  Breasts: normal in size and symmetry, skin normal, physiologic fibronodularity  Abdomen: umbilicus normal, no masses palpable, no organomegaly  Musculoskeletal:ROM of all joints is normal, no evidence of joint instability  Lymphatic: None significantly enlarged  Skin: no rash, no edema  Neuro: mental status intact, cranial nerves 2-12 intact  Pelvic: external genitalia normal, cervix normal in appearance, bimanual exam reveals normal uterus, adnexa without masses or tenderness, vaginal mucosa normal      ASSESSMENT/PLAN:  1.annual physical exam: HCM:  Pap and breast exams done.  BSE technique reviewed and patient encouraged to perform self-exam monthly.   Encourage monthly self breast exam  Encourage daily exercise for at least 30 minutes  Recommend 1500 mg Calcium with 600 units vit d daily.    Pap smears every 3 years of " today's is normal.  Recommend CBC, CMP, TSH, LP, B12, SOFIE, sed rate and rheumatoid factor- fasting.  Encouraged her to start a daily physical exercise program for at least 30 minutes, follow a plant-based diet.  Discussed the importance of exercise and healthy nutrition in relationship to treating fibromyalgia.  Discussed physical therapy and medications for fibromyalgia.  I encouraged her to come see me after labs and trying a vegetable-based diet with exercise for the next 1 to 2 months.

## 2020-09-04 LAB — CYTOLOGY REG CYTOL: NORMAL

## 2020-09-06 DIAGNOSIS — G43.119 INTRACTABLE MIGRAINE WITH AURA WITHOUT STATUS MIGRAINOSUS: ICD-10-CM

## 2020-09-08 RX ORDER — DULOXETIN HYDROCHLORIDE 60 MG/1
CAPSULE, DELAYED RELEASE ORAL
Qty: 90 CAP | Refills: 1 | Status: SHIPPED | OUTPATIENT
Start: 2020-09-08 | End: 2021-03-05 | Stop reason: SDUPTHER

## 2020-09-08 NOTE — TELEPHONE ENCOUNTER
Received request via: Pharmacy    Was the patient seen in the last year in this department? Yes LOV 9/6/2020    Does the patient have an active prescription (recently filled or refills available) for medication(s) requested? No

## 2020-09-10 LAB
HPV HR 12 DNA CVX QL NAA+PROBE: POSITIVE
HPV16 DNA SPEC QL NAA+PROBE: NEGATIVE
HPV18 DNA SPEC QL NAA+PROBE: NEGATIVE
SPECIMEN SOURCE: ABNORMAL

## 2020-11-05 ENCOUNTER — PATIENT MESSAGE (OUTPATIENT)
Dept: MEDICAL GROUP | Facility: LAB | Age: 25
End: 2020-11-05

## 2020-11-05 RX ORDER — NITROFURANTOIN 25; 75 MG/1; MG/1
100 CAPSULE ORAL 2 TIMES DAILY
Qty: 10 CAP | Refills: 0 | Status: SHIPPED | OUTPATIENT
Start: 2020-11-05 | End: 2021-06-29 | Stop reason: SDUPTHER

## 2020-12-01 ENCOUNTER — TELEMEDICINE (OUTPATIENT)
Dept: MEDICAL GROUP | Facility: LAB | Age: 25
End: 2020-12-01
Payer: COMMERCIAL

## 2020-12-01 VITALS — HEIGHT: 66 IN | WEIGHT: 220 LBS | BODY MASS INDEX: 35.36 KG/M2 | HEART RATE: 91 BPM

## 2020-12-01 DIAGNOSIS — N92.0 MENORRHAGIA WITH REGULAR CYCLE: ICD-10-CM

## 2020-12-01 DIAGNOSIS — J06.9 ACUTE URI: ICD-10-CM

## 2020-12-01 DIAGNOSIS — F41.9 ANXIETY: ICD-10-CM

## 2020-12-01 DIAGNOSIS — R11.2 NAUSEA AND VOMITING, INTRACTABILITY OF VOMITING NOT SPECIFIED, UNSPECIFIED VOMITING TYPE: ICD-10-CM

## 2020-12-01 PROCEDURE — 99214 OFFICE O/P EST MOD 30 MIN: CPT | Mod: 95,CR | Performed by: NURSE PRACTITIONER

## 2020-12-01 RX ORDER — IBUPROFEN 600 MG/1
600 TABLET ORAL 2 TIMES DAILY
Qty: 60 TAB | Refills: 1 | Status: SHIPPED | OUTPATIENT
Start: 2020-12-01 | End: 2021-03-10 | Stop reason: SDUPTHER

## 2020-12-01 RX ORDER — ONDANSETRON 4 MG/1
4 TABLET, FILM COATED ORAL 2 TIMES DAILY PRN
Qty: 30 TAB | Refills: 2 | Status: SHIPPED | OUTPATIENT
Start: 2020-12-01 | End: 2022-06-08

## 2020-12-01 RX ORDER — ESCITALOPRAM OXALATE 10 MG/1
10 TABLET ORAL DAILY
Qty: 30 TAB | Refills: 5 | Status: SHIPPED | OUTPATIENT
Start: 2020-12-01 | End: 2021-04-06

## 2020-12-01 RX ORDER — ALBUTEROL SULFATE 90 UG/1
1-2 AEROSOL, METERED RESPIRATORY (INHALATION) EVERY 4 HOURS PRN
Qty: 1 EACH | Refills: 3 | Status: SHIPPED | OUTPATIENT
Start: 2020-12-01 | End: 2022-06-08

## 2020-12-01 ASSESSMENT — FIBROSIS 4 INDEX: FIB4 SCORE: 0.38

## 2020-12-01 NOTE — PROGRESS NOTES
"Telemedicine: Established Patient   This evaluation was conducted via Zoom using secure and encrypted videoconferencing technology. The patient was in a private location in the state of [place].    The patient's identity was confirmed and verbal consent was obtained for this virtual visit.    Subjective:   CC:   Chief Complaint   Patient presents with   • Menstrual Problem   • MAURO Malagon is a 25 y.o. female presenting for evaluation and management of:    #1- super sick last week with \"head cold.\"  Low grade fever, cough, congestion and now feeling better but \"not getting full air with talking.\"  Had asthma as a child but no symptoms since.   Nonsmoker.  covid test was negative last week.   is now sick also.  Taking generic claritin daytime and benadryl at night.      #2- menstrual issues: New issue.  Describes 24-36 hours prior to menses onset \"I get violently ill,\" x the past 3 months -vomiting at least once or twice for 1 to 2 days.  Has taken multiple pregnancy tests - all negative.  Menses have become very heavy / painful.  No hx of same.  Menses are very regular - \"clockwork.\"  Last transvaginal US in [place] 2016  - told all is well.  Had transvaginal US b/c of abdominal pain.   Does not want to get pregnant, using condoms or abstinence.    Job:  Same job since 2/2020  Monogamous marriage.  Same diet / exercise program - added more vegetables.  No major traumas in the past 6 months.   Started menses yesterday    #3- anxiety:  Chronic issue.  Taking duloxetine 60 mg and lexapro 5 mg.  Anxiety has been worsening x the past few months.  Anxiety is \"almost constant,\" throughout the day.  \"overwhelming weight on her chest at times.\"  Denies depression.      ROS  Denies diarrhea, black / bloody stools, loss sense taste / smell    Allergies   Allergen Reactions   • Albumin      Unknown knows through allergy testing  Other reaction(s): Unknown  Unknown knows through allergy testing   • Bactrim    • " "Broccoli [Brassica Oleracea Italica]      \"upset stomach\"   • Crab      \"upset stomach\"   • Sulfamethoxazole W-Trimethoprim    • Tuna      \"upset stomach\"   • Pcn [Penicillins]      Urticarial rash       Current medicines (including changes today)  Current Outpatient Medications   Medication Sig Dispense Refill   • DULoxetine (CYMBALTA) 60 MG Cap DR Particles delayed-release capsule TAKE 1 CAPSULE BY MOUTH EVERY DAY 90 Cap 1   • clindamycin (CLEOCIN) 2 % vaginal cream Insert vaginally every night for 5 nights. 40 g 0   • clindamycin (CLEOCIN) 300 MG Cap Take 1 Cap by mouth 2 times a day. For BV 14 Cap 0   • escitalopram (LEXAPRO) 5 MG tablet TAKE 1 TAB BY MOUTH EVERY DAY. IN ADDITION TO DULOXETINE 90 Tab 1   • tobramycin (TOBREX) 0.3 % Solution ophthalmic solution Place 1 Drop in both eyes every 4 hours. 1 Bottle 0   • meloxicam (MOBIC) 7.5 MG Tab Take 15 mg by mouth every day.     • divalproex ER (DEPAKOTE ER) 250 MG TABLET SR 24 HR Take 250 mg by mouth every day.     • divalproex (DEPAKOTE) 125 MG EC tablet Take twice daily for two weeks, then once daily for a week, then stop. 21 Tab 0   • zolmitriptan (ZOMIG-ZMT) 5 MG disintegrating tablet Take 1/2-1 tablet po at onset of headache, may repeat dose in 2 hours if unrelieved.  Do not exceed more than 2 tablets in 24 hours. 9 Tab 5   • ibuprofen (MOTRIN) 200 MG Tab Take 200 mg by mouth every 6 hours as needed.     • SUMAtriptan (IMITREX) 50 MG Tab Take 1 Tab by mouth Once PRN for Migraine for up to 90 doses. 10 Tab 2   • EPINEPHrine HCl (EPINEPHRINE 1 MG/ML,1:1000,) 1 MG/ML Solution 0.5 mg by Intramuscular route Once.       No current facility-administered medications for this visit.        Patient Active Problem List    Diagnosis Date Noted   • Intractable migraine with aura without status migrainosus 04/18/2019   • Paresthesias 02/13/2019   • Anxiety 02/13/2019   • Mild depression (HCC) 02/13/2019   • Food allergy 09/22/2016   • Migraine without status migrainosus, " "not intractable 08/25/2016   • Lactose intolerance 07/25/2014   • Blood clotting disorder (HCC) 10/16/2013       Family History   Problem Relation Age of Onset   • Non-contributory Mother    • Other Mother         MTHFR homozygous, migraine   • Non-contributory Father    • Alcohol/Drug Father    • Anxiety disorder Sister    • Other Brother         migraine   • Thyroid Maternal Grandmother         pernicious anemia       She  has a past medical history of Allergy, Anxiety, ASTHMA, Clotting disorder (HCC), Depression, IBS (irritable bowel syndrome), and Migraine. She also has no past medical history of Diabetes.  She  has a past surgical history that includes knee arthroscopy (1/8/2009) and synovectomy (1/8/2009).       Objective:   Pulse 91   Ht 1.676 m (5' 6\")   Wt 99.8 kg (220 lb)   BMI 35.51 kg/m²     Physical Exam  Gen. appears healthy in no distress   Skin appropriate for sex and age   Neck trachea is midline  Lungs unlabored breathing  Heart regular rate  Neuro gait and station normal   Psych appropriate, calm, interactive, well-groomed    Assessment and Plan:   The following treatment plan was discussed:     1. Acute URI  -Negative Covid test.  Discussed the potential for a false negative.  She declined retesting as she is improving and she is almost 10 days from the first day of her symptoms.  Recommend albuterol 1 to 2 puffs every 4-6 hours as needed for shortness of breath or wheezing.  She will follow-up with me if she is not improving by the end of this week.  - albuterol 108 (90 Base) MCG/ACT Aero Soln inhalation aerosol; Inhale 1-2 Puffs every four hours as needed for Shortness of Breath.  Dispense: 1 Each; Refill: 3    2. Anxiety  -Increase Lexapro to 10 mg.  Continue Cymbalta at 60 mg.  Discussed the importance of following up with me within 3 to 4 weeks regarding how she is feeling.  Denies depression.  Discussed the importance of daily physical exercise and caffeine avoidance.    3. Menorrhagia " with regular cycle  -Unfortunately unable to use a combination oral contraceptive to lighten her menstrual cycle because of her history of MTHFR and high risk of blood clots.  Her mother had a blood clot during pregnancy.  Discussed a trial of NSAID use, starting 600 mg ibuprofen with breakfast and dinner approximately 5 days prior to onset of menses and discontinuing this about 2 to 3 days into her menstrual cycle.  Discussed GI and renal precautions with prescription strength ibuprofen.  Encouraged her to follow-up with her OB/GYN within 1 to 2 months if symptoms are not improving with symptomatic treatment such as Zofran and NSAIDs.  Encouraged her to work on further weight loss.    4. Nausea and vomiting, intractability of vomiting not specified, unspecified vomiting type  -Trial of Zofran 4 mg a.m./p.m., starting about 3 to 4 days prior to onset of menses and using as needed during her menstrual cycle.      Follow-up: 1 month

## 2021-01-11 RX ORDER — ESCITALOPRAM OXALATE 5 MG/1
5 TABLET ORAL DAILY
Qty: 90 TAB | Refills: 1 | Status: SHIPPED | OUTPATIENT
Start: 2021-01-11 | End: 2022-06-08

## 2021-01-11 NOTE — TELEPHONE ENCOUNTER
Received request via: Pharmacy    Was the patient seen in the last year in this department? Yes    Does the patient have an active prescription (recently filled or refills available) for medication(s) requested? No     ESCITALOPRAM 5 MG TABLET

## 2021-03-05 DIAGNOSIS — G43.119 INTRACTABLE MIGRAINE WITH AURA WITHOUT STATUS MIGRAINOSUS: ICD-10-CM

## 2021-03-05 RX ORDER — DULOXETIN HYDROCHLORIDE 60 MG/1
CAPSULE, DELAYED RELEASE ORAL
Qty: 90 CAPSULE | Refills: 1 | Status: SHIPPED | OUTPATIENT
Start: 2021-03-05 | End: 2022-06-08

## 2021-03-05 NOTE — TELEPHONE ENCOUNTER
Received request via: Pharmacy    Was the patient seen in the last year in this department? Yes  12/1/2020  Does the patient have an active prescription (recently filled or refills available) for medication(s) requested? No

## 2021-03-10 ENCOUNTER — OFFICE VISIT (OUTPATIENT)
Dept: MEDICAL GROUP | Facility: LAB | Age: 26
End: 2021-03-10
Payer: COMMERCIAL

## 2021-03-10 VITALS
WEIGHT: 237 LBS | TEMPERATURE: 97.2 F | HEIGHT: 66 IN | OXYGEN SATURATION: 98 % | RESPIRATION RATE: 12 BRPM | DIASTOLIC BLOOD PRESSURE: 82 MMHG | HEART RATE: 92 BPM | SYSTOLIC BLOOD PRESSURE: 118 MMHG | BODY MASS INDEX: 38.09 KG/M2

## 2021-03-10 DIAGNOSIS — M79.671 RIGHT FOOT PAIN: ICD-10-CM

## 2021-03-10 DIAGNOSIS — N92.0 MENORRHAGIA WITH REGULAR CYCLE: ICD-10-CM

## 2021-03-10 DIAGNOSIS — K21.9 GASTROESOPHAGEAL REFLUX DISEASE WITHOUT ESOPHAGITIS: ICD-10-CM

## 2021-03-10 DIAGNOSIS — Z23 NEED FOR VACCINATION: ICD-10-CM

## 2021-03-10 PROCEDURE — 99214 OFFICE O/P EST MOD 30 MIN: CPT | Mod: 25 | Performed by: NURSE PRACTITIONER

## 2021-03-10 PROCEDURE — 90471 IMMUNIZATION ADMIN: CPT | Performed by: NURSE PRACTITIONER

## 2021-03-10 PROCEDURE — 90686 IIV4 VACC NO PRSV 0.5 ML IM: CPT | Performed by: NURSE PRACTITIONER

## 2021-03-10 PROCEDURE — 90472 IMMUNIZATION ADMIN EACH ADD: CPT | Performed by: NURSE PRACTITIONER

## 2021-03-10 PROCEDURE — 90716 VAR VACCINE LIVE SUBQ: CPT | Performed by: NURSE PRACTITIONER

## 2021-03-10 RX ORDER — OMEPRAZOLE 20 MG/1
20 CAPSULE, DELAYED RELEASE ORAL DAILY
Qty: 30 CAPSULE | Refills: 4
Start: 2021-03-10 | End: 2022-06-08

## 2021-03-10 RX ORDER — IBUPROFEN 600 MG/1
600 TABLET ORAL 2 TIMES DAILY
Qty: 60 TABLET | Refills: 0 | Status: SHIPPED | OUTPATIENT
Start: 2021-03-10 | End: 2022-06-08

## 2021-03-10 ASSESSMENT — PATIENT HEALTH QUESTIONNAIRE - PHQ9
5. POOR APPETITE OR OVEREATING: NOT AT ALL
8. MOVING OR SPEAKING SO SLOWLY THAT OTHER PEOPLE COULD HAVE NOTICED. OR THE OPPOSITE, BEING SO FIGETY OR RESTLESS THAT YOU HAVE BEEN MOVING AROUND A LOT MORE THAN USUAL: NOT AT ALL
9. THOUGHTS THAT YOU WOULD BE BETTER OFF DEAD, OR OF HURTING YOURSELF: NOT AT ALL
6. FEELING BAD ABOUT YOURSELF - OR THAT YOU ARE A FAILURE OR HAVE LET YOURSELF OR YOUR FAMILY DOWN: NOT AL ALL
4. FEELING TIRED OR HAVING LITTLE ENERGY: NOT AT ALL
2. FEELING DOWN, DEPRESSED, IRRITABLE, OR HOPELESS: NOT AT ALL
SUM OF ALL RESPONSES TO PHQ QUESTIONS 1-9: 0
3. TROUBLE FALLING OR STAYING ASLEEP OR SLEEPING TOO MUCH: NOT AT ALL
SUM OF ALL RESPONSES TO PHQ9 QUESTIONS 1 AND 2: 0
1. LITTLE INTEREST OR PLEASURE IN DOING THINGS: NOT AT ALL
7. TROUBLE CONCENTRATING ON THINGS, SUCH AS READING THE NEWSPAPER OR WATCHING TELEVISION: NOT AT ALL

## 2021-03-11 NOTE — PROGRESS NOTES
"Chief Complaint   Patient presents with   • Foot Pain     top right/ X 1 month       HPI  Laquita is a 25-year-old established female here with complaint of new onset right foot pain x one month without injury.  Initially bruised and intermittently swells.  Pain comes and goes.  Worse with standing.  Pain is typically a throbbing and aching sensation.  No other associated symptoms.  Pain occasionally wakes pt up at night.   Meds: takes nsaids without improvement.  \"nothing takes the pain away.    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  Tried walking boot but this made it worse.    Hx of several fx to feet from trauma.   Chronic history of hurting all over her body.    GERD: Chronic issue.  Started omeprazole 20 mg every morning from [facility] which has completely stopped her heartburn.  Denies black or bloody stools.    Past medical, surgical, family, and social history is reviewed and updated in Epic chart by me today.   Medications and allergies reviewed and updated in Epic chart by me today.     ROS:   As documented in history of present illness above    Exam:  /82 (BP Location: Left arm, Patient Position: Sitting, BP Cuff Size: Large adult)   Pulse 92   Temp 36.2 °C (97.2 °F)   Resp 12   Ht 1.676 m (5' 6\")   Wt 108 kg (237 lb)   SpO2 98%   Constitutional: Alert, no distress, plus 3 vital signs  Skin:  Warm, dry, no rashes invisible areas  Eye: Equal, round and reactive, conjunctiva clear  ENMT: Lips without lesions  Respiratory: Unlabored respiration, lungs clear to auscultation, no wheezes, no rhonchi  Cardiovascular: Normal rate and rhythm  Musculoskeletal: Right foot does appear slightly swollen to the dorsal aspect compared to left foot although there is no overlying bruising, deformity or erythema.  She is diffusely tender throughout to her right foot but more so overlying her proximal metatarsal and tarsal region, dorsally.  Psych: Alert, pleasant, well-groomed, " normal affect    Assessment / Plan / Medical Decision makin. Right foot pain  -Recommend x-ray of her right foot..  Discussed heat versus ice and topical anti-inflammatories.  Trial of 600 mg ibuprofen twice daily with food for the next 10 days.  Discussed GI and renal precautions with ibuprofen.  Encouraged her to stop ibuprofen after 10 days if this is not helpful.  Ultimately suspect soft tissue inflammation such as tendinitis.  We discussed supportive shoes and avoiding heels.  Follow-up 10 to 14 days.  - DX-FOOT-COMPLETE 3+ RIGHT; Future  - omeprazole (PRILOSEC) 20 MG delayed-release capsule; Take 1 capsule by mouth every day.  Dispense: 30 capsule; Refill: 4    2. Need for vaccination  -Patient was counseled regarding all immunizations, what the patient is being immunized against, possible side effects, and the importance of immunization.  - Influenza Vaccine Quad Injection (PF)  - Varicella Vaccine SQ    3. Gastroesophageal reflux disease without esophagitis  -Doing well with omeprazole.  Discussed that 600 mg ibuprofen may flare up GERD and she verbalized understanding to take ibuprofen with food and plenty of water.  She will stop ibuprofen if she begins to have breakthrough heartburn.  - ibuprofen (MOTRIN) 600 MG Tab; Take 1 tablet by mouth 2 times a day. With food and water.  Stop after 10 days if not helping.  Dispense: 60 tablet; Refill: 0

## 2021-04-06 RX ORDER — ESCITALOPRAM OXALATE 10 MG/1
10 TABLET ORAL DAILY
Qty: 90 TABLET | Refills: 1 | Status: SHIPPED | OUTPATIENT
Start: 2021-04-06 | End: 2022-06-08

## 2021-06-29 RX ORDER — NITROFURANTOIN 25; 75 MG/1; MG/1
100 CAPSULE ORAL 2 TIMES DAILY
Qty: 10 CAPSULE | Refills: 0 | Status: SHIPPED | OUTPATIENT
Start: 2021-06-29 | End: 2021-07-04

## 2022-06-08 ENCOUNTER — OFFICE VISIT (OUTPATIENT)
Dept: MEDICAL GROUP | Facility: LAB | Age: 27
End: 2022-06-08
Payer: COMMERCIAL

## 2022-06-08 VITALS
RESPIRATION RATE: 12 BRPM | TEMPERATURE: 97.5 F | DIASTOLIC BLOOD PRESSURE: 62 MMHG | HEIGHT: 66 IN | HEART RATE: 90 BPM | WEIGHT: 238 LBS | SYSTOLIC BLOOD PRESSURE: 110 MMHG | BODY MASS INDEX: 38.25 KG/M2 | OXYGEN SATURATION: 97 %

## 2022-06-08 DIAGNOSIS — Z3A.01 LESS THAN 8 WEEKS GESTATION OF PREGNANCY: ICD-10-CM

## 2022-06-08 DIAGNOSIS — N92.6 MISSED PERIOD: ICD-10-CM

## 2022-06-08 LAB
INT CON NEG: NEGATIVE
INT CON POS: POSITIVE
POC URINE PREGNANCY TEST: POSITIVE

## 2022-06-08 PROCEDURE — 81025 URINE PREGNANCY TEST: CPT | Performed by: NURSE PRACTITIONER

## 2022-06-08 PROCEDURE — 99213 OFFICE O/P EST LOW 20 MIN: CPT | Performed by: NURSE PRACTITIONER

## 2022-06-08 ASSESSMENT — PATIENT HEALTH QUESTIONNAIRE - PHQ9: CLINICAL INTERPRETATION OF PHQ2 SCORE: 0

## 2022-06-09 NOTE — PROGRESS NOTES
"CC  Possible pregnancy      HPI:  Laquita is a 26-year-old established female here with her  for pregnancy confirmation.  She had 2 home pregnancy test that were positive.  This is a desired pregnancy.  LMP 5/3/2022.  First pregnancy.  Feeling nauseated at night with slight vomiting once.  Slight cramping.  Denies any vaginal bleeding since her last menstrual period.  She is not on any prescription medications and does not smoke.  She suspected that she was pregnant about a week ago and stopped drinking any recreational alcohol.  Does not do drugs.  Is allergic to shellfish/fish and does not eat sushi.  Overall feeling pretty well.  In a low stress job at a bank.  Does not have an OB/GYN.    Exam:  /62 (BP Location: Left arm, Patient Position: Sitting, BP Cuff Size: Large adult)   Pulse 90   Temp 36.4 °C (97.5 °F)   Resp 12   Ht 1.676 m (5' 6\")   Wt 108 kg (238 lb)   SpO2 97%   Gen. appears healthy in no distress   Skin appropriate for sex and age   Neck trachea is midline  Lungs unlabored breathing  Heart regular rate  Neuro gait and station normal   Psych appropriate, calm, interactive, well-groomed          A/P:    1. Less than 8 weeks gestation of pregnancy     2. Missed period  POCT Pregnancy     Overall feeling really well.  Discussed avoidance of sushi, limiting caffeine to 200 mg or less, avoiding NSAIDs.  May take Tylenol as needed for pain 2000 mg/day.  Discussed that it is okay to take antihistamine such as generic Claritin or Zyrtec as needed for allergy symptoms.  Fortunately she does not smoke and she understands to avoid all alcohol.  No prescription medications.  She is already taking a prenatal vitamin.  I gave her numerous suggestions regarding establishing with an OB/GYN and she understands that she should call tomorrow as she will most likely have to wait 5 to 6 weeks to see someone.  She will let me know when she does have a OB/GYN appointment and she will also need an updated " Pap smear at that appointment.  I encouraged her to email or call me if she has any concerns or questions.  Encouraged her to exercise and eat healthy.

## 2022-07-14 LAB
ABO GROUP BLD: NORMAL
HBV SURFACE AG SERPL QL IA: NORMAL
HIV 1+2 AB+HIV1 P24 AG SERPL QL IA: NON REACTIVE
RH BLD: NORMAL
RUBV IGG SERPL IA-ACNC: NORMAL
TREPONEMA PALLIDUM IGG+IGM AB [PRESENCE] IN SERUM OR PLASMA BY IMMUNOASSAY: NON REACTIVE

## 2023-01-04 ENCOUNTER — HOSPITAL ENCOUNTER (OUTPATIENT)
Facility: MEDICAL CENTER | Age: 28
End: 2023-01-06
Attending: OBSTETRICS & GYNECOLOGY | Admitting: OBSTETRICS & GYNECOLOGY
Payer: COMMERCIAL

## 2023-01-04 PROBLEM — R74.01 TRANSAMINITIS: Status: ACTIVE | Noted: 2023-01-04

## 2023-01-04 LAB
ALBUMIN SERPL BCP-MCNC: 3.7 G/DL (ref 3.2–4.9)
ALBUMIN/GLOB SERPL: 1.1 G/DL
ALP SERPL-CCNC: 169 U/L (ref 30–99)
ALT SERPL-CCNC: 104 U/L (ref 2–50)
ANION GAP SERPL CALC-SCNC: 13 MMOL/L (ref 7–16)
AST SERPL-CCNC: 57 U/L (ref 12–45)
BASOPHILS # BLD AUTO: 0.3 % (ref 0–1.8)
BASOPHILS # BLD: 0.03 K/UL (ref 0–0.12)
BILIRUB SERPL-MCNC: 0.4 MG/DL (ref 0.1–1.5)
BUN SERPL-MCNC: 11 MG/DL (ref 8–22)
CALCIUM ALBUM COR SERPL-MCNC: 10.1 MG/DL (ref 8.5–10.5)
CALCIUM SERPL-MCNC: 9.9 MG/DL (ref 8.5–10.5)
CHLORIDE SERPL-SCNC: 101 MMOL/L (ref 96–112)
CO2 SERPL-SCNC: 19 MMOL/L (ref 20–33)
CREAT SERPL-MCNC: 0.66 MG/DL (ref 0.5–1.4)
CREAT UR-MCNC: 105.29 MG/DL
EOSINOPHIL # BLD AUTO: 0.03 K/UL (ref 0–0.51)
EOSINOPHIL NFR BLD: 0.3 % (ref 0–6.9)
ERYTHROCYTE [DISTWIDTH] IN BLOOD BY AUTOMATED COUNT: 47 FL (ref 35.9–50)
GFR SERPLBLD CREATININE-BSD FMLA CKD-EPI: 123 ML/MIN/1.73 M 2
GLOBULIN SER CALC-MCNC: 3.3 G/DL (ref 1.9–3.5)
GLUCOSE SERPL-MCNC: 98 MG/DL (ref 65–99)
HCT VFR BLD AUTO: 35.6 % (ref 37–47)
HGB BLD-MCNC: 11.3 G/DL (ref 12–16)
IMM GRANULOCYTES # BLD AUTO: 0.15 K/UL (ref 0–0.11)
IMM GRANULOCYTES NFR BLD AUTO: 1.4 % (ref 0–0.9)
LYMPHOCYTES # BLD AUTO: 2.19 K/UL (ref 1–4.8)
LYMPHOCYTES NFR BLD: 20.6 % (ref 22–41)
MCH RBC QN AUTO: 25.7 PG (ref 27–33)
MCHC RBC AUTO-ENTMCNC: 31.7 G/DL (ref 33.6–35)
MCV RBC AUTO: 81.1 FL (ref 81.4–97.8)
MONOCYTES # BLD AUTO: 0.63 K/UL (ref 0–0.85)
MONOCYTES NFR BLD AUTO: 5.9 % (ref 0–13.4)
NEUTROPHILS # BLD AUTO: 7.59 K/UL (ref 2–7.15)
NEUTROPHILS NFR BLD: 71.5 % (ref 44–72)
NRBC # BLD AUTO: 0 K/UL
NRBC BLD-RTO: 0 /100 WBC
PLATELET # BLD AUTO: 286 K/UL (ref 164–446)
PMV BLD AUTO: 11.5 FL (ref 9–12.9)
POTASSIUM SERPL-SCNC: 3.9 MMOL/L (ref 3.6–5.5)
PROT SERPL-MCNC: 7 G/DL (ref 6–8.2)
PROT UR-MCNC: 25 MG/DL (ref 0–15)
PROT/CREAT UR: 237 MG/G (ref 10–107)
RBC # BLD AUTO: 4.39 M/UL (ref 4.2–5.4)
SODIUM SERPL-SCNC: 133 MMOL/L (ref 135–145)
WBC # BLD AUTO: 10.6 K/UL (ref 4.8–10.8)

## 2023-01-04 PROCEDURE — 82570 ASSAY OF URINE CREATININE: CPT

## 2023-01-04 PROCEDURE — 85025 COMPLETE CBC W/AUTO DIFF WBC: CPT

## 2023-01-04 PROCEDURE — 302790 HCHG STAT ANTEPARTUM CARE, DAILY

## 2023-01-04 PROCEDURE — 770002 HCHG ROOM/CARE - OB PRIVATE (112)

## 2023-01-04 PROCEDURE — 80053 COMPREHEN METABOLIC PANEL: CPT

## 2023-01-04 PROCEDURE — 302449 STATCHG TRIAGE ONLY (STATISTIC)

## 2023-01-04 PROCEDURE — 84156 ASSAY OF PROTEIN URINE: CPT

## 2023-01-04 PROCEDURE — G0378 HOSPITAL OBSERVATION PER HR: HCPCS

## 2023-01-04 PROCEDURE — 36415 COLL VENOUS BLD VENIPUNCTURE: CPT

## 2023-01-04 ASSESSMENT — PAIN SCALES - GENERAL: PAINLEVEL: 0 - NO PAIN

## 2023-01-05 ENCOUNTER — APPOINTMENT (OUTPATIENT)
Dept: RADIOLOGY | Facility: MEDICAL CENTER | Age: 28
End: 2023-01-05
Attending: OBSTETRICS & GYNECOLOGY
Payer: COMMERCIAL

## 2023-01-05 LAB
ALBUMIN SERPL BCP-MCNC: 3.3 G/DL (ref 3.2–4.9)
ALBUMIN/GLOB SERPL: 1.1 G/DL
ALP SERPL-CCNC: 159 U/L (ref 30–99)
ALT SERPL-CCNC: 104 U/L (ref 2–50)
ANION GAP SERPL CALC-SCNC: 15 MMOL/L (ref 7–16)
AST SERPL-CCNC: 59 U/L (ref 12–45)
BILIRUB SERPL-MCNC: 0.5 MG/DL (ref 0.1–1.5)
BUN SERPL-MCNC: 8 MG/DL (ref 8–22)
CALCIUM ALBUM COR SERPL-MCNC: 10.7 MG/DL (ref 8.5–10.5)
CALCIUM SERPL-MCNC: 10.1 MG/DL (ref 8.5–10.5)
CHLORIDE SERPL-SCNC: 106 MMOL/L (ref 96–112)
CO2 SERPL-SCNC: 17 MMOL/L (ref 20–33)
CREAT SERPL-MCNC: 0.57 MG/DL (ref 0.5–1.4)
ERYTHROCYTE [DISTWIDTH] IN BLOOD BY AUTOMATED COUNT: 46.6 FL (ref 35.9–50)
GFR SERPLBLD CREATININE-BSD FMLA CKD-EPI: 127 ML/MIN/1.73 M 2
GLOBULIN SER CALC-MCNC: 3.1 G/DL (ref 1.9–3.5)
GLUCOSE BLD STRIP.AUTO-MCNC: 101 MG/DL (ref 65–99)
GLUCOSE BLD STRIP.AUTO-MCNC: 108 MG/DL (ref 65–99)
GLUCOSE BLD STRIP.AUTO-MCNC: 115 MG/DL (ref 65–99)
GLUCOSE BLD STRIP.AUTO-MCNC: 82 MG/DL (ref 65–99)
GLUCOSE SERPL-MCNC: 90 MG/DL (ref 65–99)
HCT VFR BLD AUTO: 34.9 % (ref 37–47)
HGB BLD-MCNC: 11.1 G/DL (ref 12–16)
MCH RBC QN AUTO: 25.5 PG (ref 27–33)
MCHC RBC AUTO-ENTMCNC: 31.8 G/DL (ref 33.6–35)
MCV RBC AUTO: 80 FL (ref 81.4–97.8)
PLATELET # BLD AUTO: 266 K/UL (ref 164–446)
PMV BLD AUTO: 11.6 FL (ref 9–12.9)
POTASSIUM SERPL-SCNC: 3.8 MMOL/L (ref 3.6–5.5)
PROT 24H UR-MCNC: 337.5 MG/24 HR (ref 30–150)
PROT 24H UR-MRATE: 9 MG/DL (ref 0–15)
PROT SERPL-MCNC: 6.4 G/DL (ref 6–8.2)
RBC # BLD AUTO: 4.36 M/UL (ref 4.2–5.4)
SODIUM SERPL-SCNC: 138 MMOL/L (ref 135–145)
SPECIMEN VOL UR: 3750 ML
WBC # BLD AUTO: 10.4 K/UL (ref 4.8–10.8)

## 2023-01-05 PROCEDURE — 302790 HCHG STAT ANTEPARTUM CARE, DAILY

## 2023-01-05 PROCEDURE — G0378 HOSPITAL OBSERVATION PER HR: HCPCS

## 2023-01-05 PROCEDURE — 87150 DNA/RNA AMPLIFIED PROBE: CPT

## 2023-01-05 PROCEDURE — 86708 HEPATITIS A ANTIBODY: CPT

## 2023-01-05 PROCEDURE — A9270 NON-COVERED ITEM OR SERVICE: HCPCS | Performed by: OBSTETRICS & GYNECOLOGY

## 2023-01-05 PROCEDURE — 81050 URINALYSIS VOLUME MEASURE: CPT

## 2023-01-05 PROCEDURE — 700102 HCHG RX REV CODE 250 W/ 637 OVERRIDE(OP): Performed by: OBSTETRICS & GYNECOLOGY

## 2023-01-05 PROCEDURE — 84156 ASSAY OF PROTEIN URINE: CPT

## 2023-01-05 PROCEDURE — 59025 FETAL NON-STRESS TEST: CPT

## 2023-01-05 PROCEDURE — 87081 CULTURE SCREEN ONLY: CPT

## 2023-01-05 PROCEDURE — 36415 COLL VENOUS BLD VENIPUNCTURE: CPT

## 2023-01-05 PROCEDURE — 85027 COMPLETE CBC AUTOMATED: CPT

## 2023-01-05 PROCEDURE — 76816 OB US FOLLOW-UP PER FETUS: CPT

## 2023-01-05 PROCEDURE — 80053 COMPREHEN METABOLIC PANEL: CPT

## 2023-01-05 PROCEDURE — 86709 HEPATITIS A IGM ANTIBODY: CPT

## 2023-01-05 PROCEDURE — 82962 GLUCOSE BLOOD TEST: CPT | Mod: 91

## 2023-01-05 RX ORDER — DOCUSATE SODIUM 100 MG/1
100 CAPSULE, LIQUID FILLED ORAL
Status: DISCONTINUED | OUTPATIENT
Start: 2023-01-05 | End: 2023-01-06 | Stop reason: HOSPADM

## 2023-01-05 RX ORDER — ACETAMINOPHEN 500 MG
1000 TABLET ORAL EVERY 6 HOURS PRN
Status: DISCONTINUED | OUTPATIENT
Start: 2023-01-05 | End: 2023-01-06 | Stop reason: HOSPADM

## 2023-01-05 RX ORDER — FAMOTIDINE 20 MG/1
20 TABLET, FILM COATED ORAL 2 TIMES DAILY
Status: DISCONTINUED | OUTPATIENT
Start: 2023-01-05 | End: 2023-01-06 | Stop reason: HOSPADM

## 2023-01-05 RX ORDER — VITAMIN A ACETATE, BETA CAROTENE, ASCORBIC ACID, CHOLECALCIFEROL, .ALPHA.-TOCOPHEROL ACETATE, DL-, THIAMINE MONONITRATE, RIBOFLAVIN, NIACINAMIDE, PYRIDOXINE HYDROCHLORIDE, FOLIC ACID, CYANOCOBALAMIN, CALCIUM CARBONATE, FERROUS FUMARATE, ZINC OXIDE, CUPRIC OXIDE 3080; 12; 120; 400; 1; 1.84; 3; 20; 22; 920; 25; 200; 27; 10; 2 [IU]/1; UG/1; MG/1; [IU]/1; MG/1; MG/1; MG/1; MG/1; MG/1; [IU]/1; MG/1; MG/1; MG/1; MG/1; MG/1
1 TABLET, FILM COATED ORAL NIGHTLY
Status: DISCONTINUED | OUTPATIENT
Start: 2023-01-05 | End: 2023-01-06 | Stop reason: HOSPADM

## 2023-01-05 RX ADMIN — FAMOTIDINE 20 MG: 20 TABLET, FILM COATED ORAL at 17:55

## 2023-01-05 RX ADMIN — FAMOTIDINE 20 MG: 20 TABLET, FILM COATED ORAL at 09:25

## 2023-01-05 RX ADMIN — ACETAMINOPHEN 1000 MG: 500 TABLET ORAL at 10:07

## 2023-01-05 RX ADMIN — PRENATAL WITH FERROUS FUM AND FOLIC ACID 1 TABLET: 3080; 920; 120; 400; 22; 1.84; 3; 20; 10; 1; 12; 200; 27; 25; 2 TABLET ORAL at 21:03

## 2023-01-05 RX ADMIN — CHOLECALCIFEROL TAB 125 MCG (5000 UNIT) 5000 UNITS: 125 TAB at 11:05

## 2023-01-05 RX ADMIN — DOCUSATE SODIUM 100 MG: 100 CAPSULE, LIQUID FILLED ORAL at 09:25

## 2023-01-05 ASSESSMENT — PATIENT HEALTH QUESTIONNAIRE - PHQ9
1. LITTLE INTEREST OR PLEASURE IN DOING THINGS: NOT AT ALL
2. FEELING DOWN, DEPRESSED, IRRITABLE, OR HOPELESS: NOT AT ALL
SUM OF ALL RESPONSES TO PHQ9 QUESTIONS 1 AND 2: 0

## 2023-01-05 NOTE — PROGRESS NOTES
27 y.o  EDC 23 (35w1d)    Pt presents to Labor and Delivery for PIH workup d/t abnormal lab results. Monitors applied x2. VSS. Pt states +FM, denies LOF/VB, and states she has rare contractions. POC discussed. All questions and concerns addressed at this time.      SBAR given to Dr. Sosa. Orders received to repeat PIH labs and monitor BP's q20 min.      Lab results discussed with Dr. Sosa. Orders received to admit pt for observation, collect a 24 hour urine and repeat labs in the morning.     Pt ambulated from LDA1 to S233 for Ob's. Report given to Vidya NICOLE.

## 2023-01-05 NOTE — PROGRESS NOTES
"OB Antepartum Note    Doing well this morning. Mild HA but this is baseline for her. Denies visual disturbances or abdominal pain. Denies itching.  Denies history of COVID infection with this pregnancy.  She did have COVID 2020.  Denies any upper respiratory symptoms currently.  Tolerating PO. Voiding without difficulty. Denies CTX, bleeding or LOF. Good FM.      /75   Pulse 90   Temp 36.4 °C (97.6 °F) (Temporal)   Resp 17   Ht 1.676 m (5' 6\")   Wt 117 kg (257 lb)   LMP 2022   SpO2 96%   BMI 41.48 kg/m²     General: NAD, sitting up in bed comfortably  Abdomen:soft, mildly TTP in her epigastrium, no guarding or rebound  Extremities: no edema    FHT:baseline of 120's, moderate variability, accelerations present, decelerations absent  [place]: 2-5 min      Recent Labs     23  0437   WBC 10.6 10.4   RBC 4.39 4.36   HEMOGLOBIN 11.3* 11.1*   HEMATOCRIT 35.6* 34.9*   MCV 81.1* 80.0*   MCH 25.7* 25.5*   RDW 47.0 46.6   PLATELETCT 286 266   MPV 11.5 11.6   NEUTSPOLYS 71.50  --    LYMPHOCYTES 20.60*  --    MONOCYTES 5.90  --    EOSINOPHILS 0.30  --    BASOPHILS 0.30  --      Creatinine:0.57  AST: 57 --->59  ALT: 104--->104    Protein creatinine ratio: 237    Assessment:   IUP at 35w2d  Elevated liver enzymes    Plan:  24 hr urine protein in progress. Continue to trend lab values. Monitor BP. Hepatitis A ordered.  HR PC consulted by Dr. Yip.  Plan for growth ultrasound now.    Roger Toro M.D.    "

## 2023-01-05 NOTE — PROGRESS NOTES
2150 Report received from BONNIE Brennan. POC discussed, questions answered.     2155 Patient oriented to room and 24hr urine testing. POC discussed, encouraged to call out with any needs. No questions or concerns at this time.     2220 Dr. Sosa phoned and orders were clarified.    0700 Report given to BONNIE Mansfield. POC discussed, questions answered.

## 2023-01-05 NOTE — DISCHARGE PLANNING
Patient rolled back to observation/outpatient status per attending MD determination, Dr. Toro, and UR committee MD secondary review, Dr. Gagnon.  Condition Code 44 completed.

## 2023-01-05 NOTE — DISCHARGE PLANNING
:    Contacted by Keyanna Castillo RN to assist with providing Pt a form-Code 44 which was explained to mother by Keyanna over the phone.  SW provided Pt with form and Keyanna spoke with mother.      Nothing further at this time.

## 2023-01-05 NOTE — H&P
Date: 2023    Patient ID: 27-year-old  1 para 0 at 35+1 weeks by last menstrual period (EDC 2023)    Chief complaint: Sent from the office due to elevated liver function tests.    History of present illness: The patient has prenatal care with Dr. Yip.  Her pregnancy has been complicated by history of depression, anxiety, migraine headaches, IBS, MTHFR mutation and class III obesity.    The patient was seen in our office on  and was noted to have 1 systolic blood pressure in the 140s.  Her recheck was normal.  She had preeclampsia labs drawn and her LFTs were noted to be 46 and 81.  She was sent to labor and delivery for repeat labs by Dr. Yip today.  The patient denies headaches or changes in vision.  She denies right upper quadrant pain.  She has not had any additional elevated blood pressures.  She does not have baseline LFTs.    She endorses positive fetal movement.  She denies vaginal bleeding or loss of fluid.  She denies contractions.  She has gestational diabetes and recently started checking her blood sugars.  At this time she has a GDM A1.  She is seeing the [facility].  She has no other issues or complaints at this time.    Review of systems: Denies fevers, chills, shortness of breath/chest pain, nausea/vomiting, diarrhea or dysuria.    Past medical history: Depression/anxiety, migraine headaches, irritable bowel syndrome, MTHFR mutation.    Past surgical history: Right knee surgery ( and ).    Family history: Mother with clotting disorder?,  Maternal grandmother with hyperlipidemia and hypertension, half brother with hemophilia.    Social history: Denies tobacco use, alcohol use or drug use.      GYN history: Last menstrual period 5/3/2022, denies history of sexually transmitted infections or genital herpes.    OB history: G1: Current, prenatal care Dr. Yip    Allergies: Penicillin (rash), Septra (rash).    Medications: Prenatal vitamins.    Physical  exam:  Vitals:    23   BP: 124/68   Pulse: 84   Resp:    Temp:    SpO2:    General: Alert, conversational, pleasant, no acute distress  Cardiovascular: Regular rate  Pulmonary: No respiratory distress, symmetric expansion  Abdomen: Soft, nontender, nondistended, gravid  Genitourinary: Deferred  Etremities: Moves all, 1+ edema    NST: Baseline 140s, moderate variability, positive accelerations, no decelerations, tocometer quiet.    Laboratory studies: O+, antibody negative, HIV nonreactive, rubella immune, RPR nonreactive, hepatitis C antibody negative, hepatitis B surface antigen negative, urine culture negative, 1 hour glucose tolerance test 184, 3-hour glucose tolerance test abnormal, ferritin 7, vitamin D3 19.6, CMP from  with AST of 46 and an ALT of 81, platelets 281, creatinine 0.65, protein creatinine ratio 260.    Imaging: Anatomy ultrasound report shows single live intrauterine pregnancy with normal anatomy.     Assessment/plan:   27-year-old  1 para 0 at 35+1 weeks by last menstrual period (EDC 2023)  1.  Single live intrauterine pregnancy at 35+1 weeks.  2.  Gestational diabetes A1.  3.  Transaminitis of unknown etiology.  4.  Class III obesity.    Discussion: The patient had LFTs drawn yesterday in our office which showed an AST of 46 and an ALT of 81.  The patient does not have baseline LFTs.  Her LFTs in triage today showed an AST of 57 and ALT of 104.  Her preeclampsia labs are otherwise normal.  She has not had any elevated blood pressures in triage.  Given her ALT is twice the upper limit of normal I recommended 23-hour observation to rule out help versus preeclampsia with severe features.  Given the patient is a diabetic and in the late  delivery.  We will hold off on betamethasone for fetal lung maturity.    Plan:  1.  Admit to labor and delivery.  2.  CBC/CMP in AM.  3.  24-hour urine protein collection.  4.  NST every shift.  5.  Consistent carbohydrate diet.  6.   Sequential compression devices.    Cassandra Sosa MD

## 2023-01-06 VITALS
WEIGHT: 257 LBS | HEART RATE: 88 BPM | OXYGEN SATURATION: 99 % | SYSTOLIC BLOOD PRESSURE: 132 MMHG | RESPIRATION RATE: 17 BRPM | DIASTOLIC BLOOD PRESSURE: 68 MMHG | TEMPERATURE: 97.1 F | HEIGHT: 66 IN | BODY MASS INDEX: 41.3 KG/M2

## 2023-01-06 LAB
ALBUMIN SERPL BCP-MCNC: 3.4 G/DL (ref 3.2–4.9)
ALBUMIN/GLOB SERPL: 1 G/DL
ALP SERPL-CCNC: 166 U/L (ref 30–99)
ALT SERPL-CCNC: 140 U/L (ref 2–50)
ANION GAP SERPL CALC-SCNC: 12 MMOL/L (ref 7–16)
AST SERPL-CCNC: 75 U/L (ref 12–45)
BASOPHILS # BLD AUTO: 0.2 % (ref 0–1.8)
BASOPHILS # BLD: 0.02 K/UL (ref 0–0.12)
BILIRUB SERPL-MCNC: 0.5 MG/DL (ref 0.1–1.5)
BUN SERPL-MCNC: 7 MG/DL (ref 8–22)
CALCIUM ALBUM COR SERPL-MCNC: 9.8 MG/DL (ref 8.5–10.5)
CALCIUM SERPL-MCNC: 9.3 MG/DL (ref 8.5–10.5)
CHLORIDE SERPL-SCNC: 105 MMOL/L (ref 96–112)
CO2 SERPL-SCNC: 18 MMOL/L (ref 20–33)
CREAT SERPL-MCNC: 0.59 MG/DL (ref 0.5–1.4)
EOSINOPHIL # BLD AUTO: 0.04 K/UL (ref 0–0.51)
EOSINOPHIL NFR BLD: 0.4 % (ref 0–6.9)
ERYTHROCYTE [DISTWIDTH] IN BLOOD BY AUTOMATED COUNT: 45.6 FL (ref 35.9–50)
GFR SERPLBLD CREATININE-BSD FMLA CKD-EPI: 126 ML/MIN/1.73 M 2
GLOBULIN SER CALC-MCNC: 3.3 G/DL (ref 1.9–3.5)
GLUCOSE BLD STRIP.AUTO-MCNC: 113 MG/DL (ref 65–99)
GLUCOSE BLD STRIP.AUTO-MCNC: 98 MG/DL (ref 65–99)
GLUCOSE SERPL-MCNC: 135 MG/DL (ref 65–99)
GP B STREP DNA SPEC QL NAA+PROBE: NEGATIVE
HAV AB SER QL IA: POSITIVE
HAV IGM SERPL QL IA: NEGATIVE
HCT VFR BLD AUTO: 35.6 % (ref 37–47)
HGB BLD-MCNC: 11.5 G/DL (ref 12–16)
IMM GRANULOCYTES # BLD AUTO: 0.07 K/UL (ref 0–0.11)
IMM GRANULOCYTES NFR BLD AUTO: 0.8 % (ref 0–0.9)
LYMPHOCYTES # BLD AUTO: 1.74 K/UL (ref 1–4.8)
LYMPHOCYTES NFR BLD: 19.1 % (ref 22–41)
MCH RBC QN AUTO: 25.7 PG (ref 27–33)
MCHC RBC AUTO-ENTMCNC: 32.3 G/DL (ref 33.6–35)
MCV RBC AUTO: 79.5 FL (ref 81.4–97.8)
MONOCYTES # BLD AUTO: 0.52 K/UL (ref 0–0.85)
MONOCYTES NFR BLD AUTO: 5.7 % (ref 0–13.4)
NEUTROPHILS # BLD AUTO: 6.73 K/UL (ref 2–7.15)
NEUTROPHILS NFR BLD: 73.8 % (ref 44–72)
NRBC # BLD AUTO: 0 K/UL
NRBC BLD-RTO: 0 /100 WBC
PLATELET # BLD AUTO: 281 K/UL (ref 164–446)
PMV BLD AUTO: 11.1 FL (ref 9–12.9)
POTASSIUM SERPL-SCNC: 3.7 MMOL/L (ref 3.6–5.5)
PROT SERPL-MCNC: 6.7 G/DL (ref 6–8.2)
RBC # BLD AUTO: 4.48 M/UL (ref 4.2–5.4)
SODIUM SERPL-SCNC: 135 MMOL/L (ref 135–145)
WBC # BLD AUTO: 9.1 K/UL (ref 4.8–10.8)

## 2023-01-06 PROCEDURE — 80053 COMPREHEN METABOLIC PANEL: CPT

## 2023-01-06 PROCEDURE — 36415 COLL VENOUS BLD VENIPUNCTURE: CPT

## 2023-01-06 PROCEDURE — 85025 COMPLETE CBC W/AUTO DIFF WBC: CPT

## 2023-01-06 PROCEDURE — 59025 FETAL NON-STRESS TEST: CPT

## 2023-01-06 PROCEDURE — 700102 HCHG RX REV CODE 250 W/ 637 OVERRIDE(OP): Performed by: OBSTETRICS & GYNECOLOGY

## 2023-01-06 PROCEDURE — G0378 HOSPITAL OBSERVATION PER HR: HCPCS

## 2023-01-06 PROCEDURE — 82962 GLUCOSE BLOOD TEST: CPT

## 2023-01-06 PROCEDURE — A9270 NON-COVERED ITEM OR SERVICE: HCPCS | Performed by: OBSTETRICS & GYNECOLOGY

## 2023-01-06 RX ADMIN — DOCUSATE SODIUM 100 MG: 100 CAPSULE, LIQUID FILLED ORAL at 07:49

## 2023-01-06 RX ADMIN — FAMOTIDINE 20 MG: 20 TABLET, FILM COATED ORAL at 07:49

## 2023-01-06 NOTE — DIETARY
Nutrition Services: Brief Update     RD received alert from Nutrition Representative that pt has a diet order for Consistent CHO. Per chart review, pt has gestational diabetes. RD changed diet to Gestational Diabetes as this diet is more appropriate at this time.     RD monitoring per dept policy.

## 2023-01-06 NOTE — DISCHARGE INSTRUCTIONS
Labor Instructions (37 - 39 weeks):  Call your physician or return to hospital if:  You have regular contractions that get progressively closer, longer and stronger.  Your water breaks (remember time and color).  You have bleeding like a period.  Your baby does not move enough to complete the daily kick counts (10 movements in 2 hours)  Your baby moves much less often than on the days before or you have not felt your baby move all day.    Hypertension During Pregnancy  High blood pressure (hypertension) is when the force of blood pumping through the arteries is too strong. Arteries are blood vessels that carry blood from the heart throughout the body. Hypertension during pregnancy can be mild or severe. Severe hypertension during pregnancy (preeclampsia) is a medical emergency that requires prompt evaluation and treatment.  Different types of hypertension can happen during pregnancy. These include:  Chronic hypertension. This happens when you had high blood pressure before you became pregnant, and it continues during the pregnancy. Hypertension that develops before you are 20 weeks pregnant and continues during the pregnancy is also called chronic hypertension. If you have chronic hypertension, it will not go away after you have your baby. You will need follow-up visits with your health care provider after you have your baby. Your doctor may want you to keep taking medicine for your blood pressure.  Gestational hypertension. This is hypertension that develops after the 20th week of pregnancy. Gestational hypertension usually goes away after you have your baby, but your health care provider will need to monitor your blood pressure to make sure that it is getting better.  Preeclampsia. This is severe hypertension during pregnancy. This can cause serious complications for you and your baby and can also cause complications for you after the delivery of your baby.  Postpartum preeclampsia. You may develop severe  hypertension after giving birth. This usually occurs within 48 hours after childbirth but may occur up to 6 weeks after giving birth. This is rare.  How does this affect me?  Women who have hypertension during pregnancy have a greater chance of developing hypertension later in life or during future pregnancies. In some cases, hypertension during pregnancy can cause serious complications, such as:  Stroke.  Heart attack.  Injury to other organs, such as kidneys, lungs, or liver.  Preeclampsia.  Convulsions or seizures.  Placental abruption.  How does this affect my baby?  Hypertension during pregnancy can affect your baby. Your baby may:  Be born early (prematurely).  Not weigh as much as he or she should at birth (low birth weight).  Not tolerate labor well, leading to an unplanned  delivery.  What are the risks?  There are certain factors that make it more likely for you to develop hypertension during pregnancy. These include:  Having hypertension during a previous pregnancy.  Being overweight.  Being age 35 or older.  Being pregnant for the first time.  Being pregnant with more than one baby.  Becoming pregnant using fertilization methods, such as IVF (in vitro fertilization).  Having other medical problems, such as diabetes, kidney disease, or lupus.  Having a family history of hypertension.  What can I do to lower my risk?  The exact cause of hypertension during pregnancy is not known. You may be able to lower your risk by:  Maintaining a healthy weight.  Eating a healthy and balanced diet.  Following your health care provider's instructions about treating any long-term conditions that you had before becoming pregnant.  It is very important to keep all of your prenatal care appointments. Your health care provider will check your blood pressure and make sure that your pregnancy is progressing as expected. If a problem is found, early treatment can prevent complications.  How is this treated?  Treatment  for hypertension during pregnancy varies depending on the type of hypertension you have and how serious it is.  If you were taking medicine for high blood pressure before you became pregnant, talk with your health care provider. You may need to change medicine during pregnancy because some medicines, like ACE inhibitors, may not be considered safe for your baby.  If you have gestational hypertension, your health care provider may order medicine to treat this during pregnancy.  If you are at risk for preeclampsia, your health care provider may recommend that you take a low-dose aspirin during your pregnancy.  If you have severe hypertension, you may need to be hospitalized so you and your baby can be monitored closely. You may also need to be given medicine to lower your blood pressure. This medicine may be given by mouth or through an IV.  In some cases, if your condition gets worse, you may need to deliver your baby early.  Follow these instructions at home:  Eating and drinking    Drink enough fluid to keep your urine pale yellow.  Avoid caffeine.  Lifestyle  Do not use any products that contain nicotine or tobacco, such as cigarettes, e-cigarettes, and chewing tobacco. If you need help quitting, ask your health care provider.  Do not use alcohol or drugs.  Avoid stress as much as possible.  Rest and get plenty of sleep.  Regular exercise can help to reduce your blood pressure. Ask your health care provider what kinds of exercise are best for you.  General instructions  Take over-the-counter and prescription medicines only as told by your health care provider.  Keep all prenatal and follow-up visits as told by your health care provider. This is important.  Contact a health care provider if:  You have symptoms that your health care provider told you may require more treatment or monitoring, such as:  Headaches.  Nausea or vomiting.  Abdominal pain.  Dizziness.  Light-headedness.  Get help right away if:  You  have:  Severe abdominal pain that does not get better with treatment.  A severe headache that does not get better.  Vomiting that does not get better.  Sudden, rapid weight gain.  Sudden swelling in your hands, ankles, or face.  Vaginal bleeding.  Blood in your urine.  Blurred or double vision.  Shortness of breath or chest pain.  Weakness on one side of your body.  Difficulty speaking.  Your baby is not moving as much as usual.  Summary  High blood pressure (hypertension) is when the force of blood pumping through the arteries is too strong.  Hypertension during pregnancy can cause problems for you and your baby.  Treatment for hypertension during pregnancy varies depending on the type of hypertension you have and how serious it is.  Keep all prenatal and follow-up visits as told by your health care provider. This is important.  This information is not intended to replace advice given to you by your health care provider. Make sure you discuss any questions you have with your health care provider.  Document Released: 09/04/2012 Document Revised: 04/09/2020 Document Reviewed: 01/14/2020  [employer] Patient Education © 2020 [employer]

## 2023-01-06 NOTE — PROGRESS NOTES
0700 Bedside report received from Vidya NICOLE    1030 Dr. Erazo to bedside, plan to discharge patient per MD; discharge instructions complete, questions answered

## 2023-01-06 NOTE — PROGRESS NOTES
Late Entry    0700: Report received from BONNIE Dasilva. Assumed care of patient.  1300: Orders received from Dr. Yip for a growth scan US, GBS collection and SVE, due to UC's.   1330: SVE as charted, Dr. Toro notified.  1900: Report given to BONNIE Dasilva.

## 2023-01-06 NOTE — PROGRESS NOTES
OB antepartum note    At bedside to discuss further plan of care.  Blood pressure now newly elevated, nonsevere range.  Urine protein of 337 in 24 hours.  Ultrasound demonstrated vertex fetus with estimated fetal weight in the 98th percentile.    I discussed with the patient and spouse my concern that she is developing preeclampsia.  I recommend we hold off on discharge at least overnight and monitor her blood pressures.  We reviewed when delivery would be indicated for preeclampsia with and without severe features.  NST reviewed by myself.  Baseline of 120s.  Moderate variability present.  Decelerations present.  Decelerations absent.  Tocometer quiescent this evening.     Patient and spouse are amenable with plan to remain in-house.  Will reassess in the morning.

## 2023-01-06 NOTE — PROGRESS NOTES
1900 Report received from BONNIE Mansfield. PHILIP discussed, questions answered.     2230 Patient states +FM and occasional UCs. Denies LOF or VB. Encouraged to call out with any questions or concerns. No needs at this time.     0700 Report given to BONNIE Veliz. PHILIP discussed, questions answered.      It shouldn't change it - if anything else changes in the next few years with her family history or if develops change in bowel habits will consider repeat colonoscopy sooner MM

## 2023-01-07 NOTE — DISCHARGE SUMMARY
DATE OF ADMISSION:  2023   DATE OF DISCHARGE:  2023     ADMITTING DIAGNOSES:  1.  Intrauterine pregnancy at 35 and 1/7 weeks' gestational age.  2.  Class A1 gestational diabetes.  3.  Transaminitis of unknown etiology.  4.  Class 3 obesity.     DISCHARGE DIAGNOSIS:  Probable preeclampsia, non-severe who will be followed   on an outpatient basis.     HOSPITAL COURSE:  The patient is a 27-year-old female  1, para 0 who   was 35 and 1/7 weeks' gestational age.  She had been noted to have a very   mildly elevated blood pressure done in the office and so was sent for   outpatient PIH labs, which came back showing liver function tests of mild   elevation.  Because of this, she was sent over to labor and delivery for   monitoring of her blood pressures and repeat labs.  While she has been here,   her blood pressures have all been completely normal with the highest blood   pressure being yesterday on the  with a 140/70 and a 140/85.  Otherwise,   all the rest of her blood pressures have been anywhere from one-teens to   120s/50s-70s.  She has had NSTs twice per day, which have all been category 1   reactive tracings without uterine contractions noted.  She completed a 24-hour   urine, which came back showing 337 mg of protein, which put her into a mild   preeclampsia diagnosis.  Her repeat PIH labs have been drawn 3 times now with   stable hemoglobin and stable platelets.  Her liver function tests have gone   from 57-59-75 AST and from 104-140 on her ALT.  After reviewing the patient's   chart, I had a consultation with Dr. Burns from [facility].    States that the patient does meet preeclampsia diagnosis, but given the fact   that she is still only 35 weeks and 3 days today and her blood pressures are   essentially normal with only minimal elevation of her liver function tests,   she feels that we could follow her as an outpatient.  I am going to discharge   her home in stable condition.   She denies any preeclampsia symptoms.  She has   been already scheduled to follow up with an NST in our office with Dr. Yip   on Monday.  At that time, she will be given a lab slip to repeat [facility] labs to   follow her liver function tests again.  She will then have another NST on   Thursday.  As long as she remains stable, we will follow her as an outpatient   and she will be readmitted back to the hospital on 01/16 at 36 and 6/7 weeks   to start an induction of labor to deliver around 37 weeks as planned.  The   patient understands this plan of action and she is okay with going home with   resting management.  She has been told that if she needs any paperwork for her   work or job that she should call our office.        ______________________________  Corinne E. Capurro, MD CEC/JEB    DD:  01/06/2023 13:01  DT:  01/06/2023 17:40    Job#:  [id number]

## 2023-01-09 ENCOUNTER — HOSPITAL ENCOUNTER (INPATIENT)
Facility: MEDICAL CENTER | Age: 28
LOS: 4 days | End: 2023-01-13
Attending: OBSTETRICS & GYNECOLOGY | Admitting: OBSTETRICS & GYNECOLOGY
Payer: COMMERCIAL

## 2023-01-09 DIAGNOSIS — Z78.9 BREASTFEEDING (INFANT): ICD-10-CM

## 2023-01-09 LAB
ALBUMIN SERPL BCP-MCNC: 3.4 G/DL (ref 3.2–4.9)
ALBUMIN/GLOB SERPL: 0.9 G/DL
ALP SERPL-CCNC: 180 U/L (ref 30–99)
ALT SERPL-CCNC: 165 U/L (ref 2–50)
ANION GAP SERPL CALC-SCNC: 15 MMOL/L (ref 7–16)
AST SERPL-CCNC: 75 U/L (ref 12–45)
BASOPHILS # BLD AUTO: 0.2 % (ref 0–1.8)
BASOPHILS # BLD: 0.02 K/UL (ref 0–0.12)
BILIRUB SERPL-MCNC: 0.4 MG/DL (ref 0.1–1.5)
BUN SERPL-MCNC: 10 MG/DL (ref 8–22)
CALCIUM ALBUM COR SERPL-MCNC: 10.2 MG/DL (ref 8.5–10.5)
CALCIUM SERPL-MCNC: 9.7 MG/DL (ref 8.5–10.5)
CHLORIDE SERPL-SCNC: 104 MMOL/L (ref 96–112)
CO2 SERPL-SCNC: 16 MMOL/L (ref 20–33)
CREAT SERPL-MCNC: 0.6 MG/DL (ref 0.5–1.4)
CREAT UR-MCNC: 90.58 MG/DL
EOSINOPHIL # BLD AUTO: 0.03 K/UL (ref 0–0.51)
EOSINOPHIL NFR BLD: 0.3 % (ref 0–6.9)
ERYTHROCYTE [DISTWIDTH] IN BLOOD BY AUTOMATED COUNT: 48 FL (ref 35.9–50)
ERYTHROCYTE [DISTWIDTH] IN BLOOD BY AUTOMATED COUNT: 48 FL (ref 35.9–50)
GFR SERPLBLD CREATININE-BSD FMLA CKD-EPI: 126 ML/MIN/1.73 M 2
GLOBULIN SER CALC-MCNC: 3.7 G/DL (ref 1.9–3.5)
GLUCOSE BLD STRIP.AUTO-MCNC: 91 MG/DL (ref 65–99)
GLUCOSE BLD STRIP.AUTO-MCNC: 93 MG/DL (ref 65–99)
GLUCOSE SERPL-MCNC: 76 MG/DL (ref 65–99)
HCT VFR BLD AUTO: 37.3 % (ref 37–47)
HCT VFR BLD AUTO: 37.3 % (ref 37–47)
HGB BLD-MCNC: 11.9 G/DL (ref 12–16)
HGB BLD-MCNC: 11.9 G/DL (ref 12–16)
HOLDING TUBE BB 8507: NORMAL
IMM GRANULOCYTES # BLD AUTO: 0.09 K/UL (ref 0–0.11)
IMM GRANULOCYTES NFR BLD AUTO: 0.8 % (ref 0–0.9)
LYMPHOCYTES # BLD AUTO: 2.04 K/UL (ref 1–4.8)
LYMPHOCYTES NFR BLD: 18.7 % (ref 22–41)
MCH RBC QN AUTO: 25.9 PG (ref 27–33)
MCH RBC QN AUTO: 25.9 PG (ref 27–33)
MCHC RBC AUTO-ENTMCNC: 31.9 G/DL (ref 33.6–35)
MCHC RBC AUTO-ENTMCNC: 31.9 G/DL (ref 33.6–35)
MCV RBC AUTO: 81.3 FL (ref 81.4–97.8)
MCV RBC AUTO: 81.3 FL (ref 81.4–97.8)
MONOCYTES # BLD AUTO: 0.64 K/UL (ref 0–0.85)
MONOCYTES NFR BLD AUTO: 5.9 % (ref 0–13.4)
NEUTROPHILS # BLD AUTO: 8.11 K/UL (ref 2–7.15)
NEUTROPHILS NFR BLD: 74.1 % (ref 44–72)
NRBC # BLD AUTO: 0 K/UL
NRBC BLD-RTO: 0 /100 WBC
PLATELET # BLD AUTO: 311 K/UL (ref 164–446)
PLATELET # BLD AUTO: 311 K/UL (ref 164–446)
PMV BLD AUTO: 11.7 FL (ref 9–12.9)
PMV BLD AUTO: 11.7 FL (ref 9–12.9)
POTASSIUM SERPL-SCNC: 4.1 MMOL/L (ref 3.6–5.5)
PROT SERPL-MCNC: 7.1 G/DL (ref 6–8.2)
PROT UR-MCNC: 18 MG/DL (ref 0–15)
PROT/CREAT UR: 199 MG/G (ref 10–107)
RBC # BLD AUTO: 4.59 M/UL (ref 4.2–5.4)
RBC # BLD AUTO: 4.59 M/UL (ref 4.2–5.4)
SODIUM SERPL-SCNC: 135 MMOL/L (ref 135–145)
T PALLIDUM AB SER QL IA: NORMAL
URATE SERPL-MCNC: 5.1 MG/DL (ref 1.9–8.2)
WBC # BLD AUTO: 11 K/UL (ref 4.8–10.8)
WBC # BLD AUTO: 11 K/UL (ref 4.8–10.8)

## 2023-01-09 PROCEDURE — 36415 COLL VENOUS BLD VENIPUNCTURE: CPT

## 2023-01-09 PROCEDURE — 84550 ASSAY OF BLOOD/URIC ACID: CPT

## 2023-01-09 PROCEDURE — 85025 COMPLETE CBC W/AUTO DIFF WBC: CPT

## 2023-01-09 PROCEDURE — 770002 HCHG ROOM/CARE - OB PRIVATE (112)

## 2023-01-09 PROCEDURE — 84156 ASSAY OF PROTEIN URINE: CPT

## 2023-01-09 PROCEDURE — 82962 GLUCOSE BLOOD TEST: CPT

## 2023-01-09 PROCEDURE — 82570 ASSAY OF URINE CREATININE: CPT

## 2023-01-09 PROCEDURE — 80053 COMPREHEN METABOLIC PANEL: CPT

## 2023-01-09 PROCEDURE — 700111 HCHG RX REV CODE 636 W/ 250 OVERRIDE (IP): Performed by: OBSTETRICS & GYNECOLOGY

## 2023-01-09 PROCEDURE — 86780 TREPONEMA PALLIDUM: CPT

## 2023-01-09 PROCEDURE — 700105 HCHG RX REV CODE 258: Performed by: OBSTETRICS & GYNECOLOGY

## 2023-01-09 PROCEDURE — 302449 STATCHG TRIAGE ONLY (STATISTIC)

## 2023-01-09 RX ORDER — DOCUSATE SODIUM 100 MG/1
100 CAPSULE, LIQUID FILLED ORAL 2 TIMES DAILY
COMMUNITY
End: 2023-11-06

## 2023-01-09 RX ORDER — LABETALOL HYDROCHLORIDE 5 MG/ML
20-80 INJECTION, SOLUTION INTRAVENOUS
Status: DISCONTINUED | OUTPATIENT
Start: 2023-01-09 | End: 2023-01-11 | Stop reason: HOSPADM

## 2023-01-09 RX ORDER — LIDOCAINE HYDROCHLORIDE 10 MG/ML
20 INJECTION, SOLUTION INFILTRATION; PERINEURAL
Status: DISCONTINUED | OUTPATIENT
Start: 2023-01-09 | End: 2023-01-11

## 2023-01-09 RX ORDER — NIFEDIPINE 10 MG/1
10 CAPSULE ORAL
Status: DISCONTINUED | OUTPATIENT
Start: 2023-01-09 | End: 2023-01-11 | Stop reason: HOSPADM

## 2023-01-09 RX ORDER — ONDANSETRON 2 MG/ML
4 INJECTION INTRAMUSCULAR; INTRAVENOUS EVERY 6 HOURS PRN
Status: DISCONTINUED | OUTPATIENT
Start: 2023-01-09 | End: 2023-01-11 | Stop reason: HOSPADM

## 2023-01-09 RX ORDER — SODIUM CHLORIDE, SODIUM LACTATE, POTASSIUM CHLORIDE, CALCIUM CHLORIDE 600; 310; 30; 20 MG/100ML; MG/100ML; MG/100ML; MG/100ML
INJECTION, SOLUTION INTRAVENOUS CONTINUOUS
Status: DISCONTINUED | OUTPATIENT
Start: 2023-01-09 | End: 2023-01-12

## 2023-01-09 RX ORDER — IBUPROFEN 800 MG/1
800 TABLET ORAL
Status: COMPLETED | OUTPATIENT
Start: 2023-01-09 | End: 2023-01-11

## 2023-01-09 RX ORDER — TERBUTALINE SULFATE 1 MG/ML
0.25 INJECTION, SOLUTION SUBCUTANEOUS
Status: DISCONTINUED | OUTPATIENT
Start: 2023-01-09 | End: 2023-01-11

## 2023-01-09 RX ORDER — FERROUS GLUCONATE 324(38)MG
324 TABLET ORAL EVERY EVENING
COMMUNITY
End: 2023-11-06

## 2023-01-09 RX ORDER — HYDRALAZINE HYDROCHLORIDE 20 MG/ML
5-10 INJECTION INTRAMUSCULAR; INTRAVENOUS
Status: DISCONTINUED | OUTPATIENT
Start: 2023-01-09 | End: 2023-01-11 | Stop reason: HOSPADM

## 2023-01-09 RX ORDER — OXYTOCIN 10 [USP'U]/ML
10 INJECTION, SOLUTION INTRAMUSCULAR; INTRAVENOUS
Status: DISCONTINUED | OUTPATIENT
Start: 2023-01-09 | End: 2023-01-11 | Stop reason: HOSPADM

## 2023-01-09 RX ORDER — FAMOTIDINE 20 MG/1
20 TABLET, FILM COATED ORAL 2 TIMES DAILY
COMMUNITY
End: 2023-11-06

## 2023-01-09 RX ORDER — ACETAMINOPHEN 500 MG
1000 TABLET ORAL
Status: DISCONTINUED | OUTPATIENT
Start: 2023-01-09 | End: 2023-01-11 | Stop reason: HOSPADM

## 2023-01-09 RX ORDER — ERGOCALCIFEROL 1.25 MG/1
CAPSULE ORAL
Status: ON HOLD | COMMUNITY
End: 2023-01-13

## 2023-01-09 RX ORDER — ONDANSETRON 4 MG/1
4 TABLET, ORALLY DISINTEGRATING ORAL EVERY 6 HOURS PRN
Status: DISCONTINUED | OUTPATIENT
Start: 2023-01-09 | End: 2023-01-11 | Stop reason: HOSPADM

## 2023-01-09 RX ADMIN — FENTANYL CITRATE 100 MCG: 50 INJECTION, SOLUTION INTRAMUSCULAR; INTRAVENOUS at 22:55

## 2023-01-09 RX ADMIN — SODIUM CHLORIDE, POTASSIUM CHLORIDE, SODIUM LACTATE AND CALCIUM CHLORIDE: 600; 310; 30; 20 INJECTION, SOLUTION INTRAVENOUS at 20:56

## 2023-01-09 RX ADMIN — OXYTOCIN 1 MILLI-UNITS/MIN: 10 INJECTION, SOLUTION INTRAMUSCULAR; INTRAVENOUS at 20:58

## 2023-01-09 ASSESSMENT — PATIENT HEALTH QUESTIONNAIRE - PHQ9
SUM OF ALL RESPONSES TO PHQ9 QUESTIONS 1 AND 2: 0
2. FEELING DOWN, DEPRESSED, IRRITABLE, OR HOPELESS: NOT AT ALL
1. LITTLE INTEREST OR PLEASURE IN DOING THINGS: NOT AT ALL

## 2023-01-09 ASSESSMENT — LIFESTYLE VARIABLES
HAVE YOU EVER FELT YOU SHOULD CUT DOWN ON YOUR DRINKING: NO
EVER_SMOKED: NEVER
HAVE PEOPLE ANNOYED YOU BY CRITICIZING YOUR DRINKING: NO
EVER FELT BAD OR GUILTY ABOUT YOUR DRINKING: NO
ALCOHOL_USE: NO
TOTAL SCORE: 0
TOTAL SCORE: 0
CONSUMPTION TOTAL: INCOMPLETE
TOTAL SCORE: 0
EVER HAD A DRINK FIRST THING IN THE MORNING TO STEADY YOUR NERVES TO GET RID OF A HANGOVER: NO

## 2023-01-09 ASSESSMENT — FIBROSIS 4 INDEX: FIB4 SCORE: 0.61

## 2023-01-10 ENCOUNTER — ANESTHESIA EVENT (OUTPATIENT)
Dept: ANESTHESIOLOGY | Facility: MEDICAL CENTER | Age: 28
End: 2023-01-10
Payer: COMMERCIAL

## 2023-01-10 ENCOUNTER — ANESTHESIA (OUTPATIENT)
Dept: ANESTHESIOLOGY | Facility: MEDICAL CENTER | Age: 28
End: 2023-01-10
Payer: COMMERCIAL

## 2023-01-10 LAB
GLUCOSE BLD STRIP.AUTO-MCNC: 72 MG/DL (ref 65–99)
GLUCOSE BLD STRIP.AUTO-MCNC: 87 MG/DL (ref 65–99)
GLUCOSE BLD STRIP.AUTO-MCNC: 90 MG/DL (ref 65–99)
GLUCOSE BLD STRIP.AUTO-MCNC: 95 MG/DL (ref 65–99)
GLUCOSE BLD STRIP.AUTO-MCNC: 99 MG/DL (ref 65–99)

## 2023-01-10 PROCEDURE — 3E033VJ INTRODUCTION OF OTHER HORMONE INTO PERIPHERAL VEIN, PERCUTANEOUS APPROACH: ICD-10-PCS | Performed by: OBSTETRICS & GYNECOLOGY

## 2023-01-10 PROCEDURE — 01967 NEURAXL LBR ANES VAG DLVR: CPT | Performed by: STUDENT IN AN ORGANIZED HEALTH CARE EDUCATION/TRAINING PROGRAM

## 2023-01-10 PROCEDURE — 700101 HCHG RX REV CODE 250: Performed by: ANESTHESIOLOGY

## 2023-01-10 PROCEDURE — 700102 HCHG RX REV CODE 250 W/ 637 OVERRIDE(OP): Performed by: OBSTETRICS & GYNECOLOGY

## 2023-01-10 PROCEDURE — 700111 HCHG RX REV CODE 636 W/ 250 OVERRIDE (IP)

## 2023-01-10 PROCEDURE — 700111 HCHG RX REV CODE 636 W/ 250 OVERRIDE (IP): Performed by: STUDENT IN AN ORGANIZED HEALTH CARE EDUCATION/TRAINING PROGRAM

## 2023-01-10 PROCEDURE — 700111 HCHG RX REV CODE 636 W/ 250 OVERRIDE (IP): Mod: JW | Performed by: ANESTHESIOLOGY

## 2023-01-10 PROCEDURE — 700105 HCHG RX REV CODE 258: Performed by: OBSTETRICS & GYNECOLOGY

## 2023-01-10 PROCEDURE — 10907ZC DRAINAGE OF AMNIOTIC FLUID, THERAPEUTIC FROM PRODUCTS OF CONCEPTION, VIA NATURAL OR ARTIFICIAL OPENING: ICD-10-PCS | Performed by: OBSTETRICS & GYNECOLOGY

## 2023-01-10 PROCEDURE — 770002 HCHG ROOM/CARE - OB PRIVATE (112)

## 2023-01-10 PROCEDURE — 700111 HCHG RX REV CODE 636 W/ 250 OVERRIDE (IP): Performed by: ANESTHESIOLOGY

## 2023-01-10 PROCEDURE — 82962 GLUCOSE BLOOD TEST: CPT | Mod: 91

## 2023-01-10 PROCEDURE — 700105 HCHG RX REV CODE 258: Performed by: ANESTHESIOLOGY

## 2023-01-10 PROCEDURE — A9270 NON-COVERED ITEM OR SERVICE: HCPCS | Performed by: OBSTETRICS & GYNECOLOGY

## 2023-01-10 PROCEDURE — 303615 HCHG EPIDURAL/SPINAL ANESTHESIA FOR LABOR

## 2023-01-10 PROCEDURE — 700111 HCHG RX REV CODE 636 W/ 250 OVERRIDE (IP): Performed by: OBSTETRICS & GYNECOLOGY

## 2023-01-10 PROCEDURE — 10H07YZ INSERTION OF OTHER DEVICE INTO PRODUCTS OF CONCEPTION, VIA NATURAL OR ARTIFICIAL OPENING: ICD-10-PCS | Performed by: OBSTETRICS & GYNECOLOGY

## 2023-01-10 RX ORDER — ROPIVACAINE HYDROCHLORIDE 2 MG/ML
INJECTION, SOLUTION EPIDURAL; INFILTRATION; PERINEURAL
Status: COMPLETED
Start: 2023-01-10 | End: 2023-01-10

## 2023-01-10 RX ORDER — BUPIVACAINE HYDROCHLORIDE 2.5 MG/ML
INJECTION, SOLUTION EPIDURAL; INFILTRATION; INTRACAUDAL PRN
Status: DISCONTINUED | OUTPATIENT
Start: 2023-01-10 | End: 2023-01-11 | Stop reason: SURG

## 2023-01-10 RX ORDER — ROPIVACAINE HYDROCHLORIDE 2 MG/ML
INJECTION, SOLUTION EPIDURAL; INFILTRATION; PERINEURAL CONTINUOUS
Status: DISCONTINUED | OUTPATIENT
Start: 2023-01-10 | End: 2023-01-11

## 2023-01-10 RX ORDER — SODIUM CHLORIDE, SODIUM LACTATE, POTASSIUM CHLORIDE, AND CALCIUM CHLORIDE .6; .31; .03; .02 G/100ML; G/100ML; G/100ML; G/100ML
250 INJECTION, SOLUTION INTRAVENOUS PRN
Status: DISCONTINUED | OUTPATIENT
Start: 2023-01-10 | End: 2023-01-11

## 2023-01-10 RX ORDER — LIDOCAINE HYDROCHLORIDE AND EPINEPHRINE 15; 5 MG/ML; UG/ML
INJECTION, SOLUTION EPIDURAL PRN
Status: DISCONTINUED | OUTPATIENT
Start: 2023-01-10 | End: 2023-01-11 | Stop reason: SURG

## 2023-01-10 RX ORDER — BUPIVACAINE HYDROCHLORIDE 2.5 MG/ML
INJECTION, SOLUTION EPIDURAL; INFILTRATION; INTRACAUDAL
Status: ACTIVE
Start: 2023-01-10 | End: 2023-01-11

## 2023-01-10 RX ORDER — HYDROXYZINE HYDROCHLORIDE 25 MG/1
50 TABLET, FILM COATED ORAL 3 TIMES DAILY PRN
Status: DISCONTINUED | OUTPATIENT
Start: 2023-01-10 | End: 2023-01-13 | Stop reason: HOSPADM

## 2023-01-10 RX ORDER — BUPIVACAINE HYDROCHLORIDE 2.5 MG/ML
INJECTION, SOLUTION EPIDURAL; INFILTRATION; INTRACAUDAL
Status: COMPLETED
Start: 2023-01-10 | End: 2023-01-10

## 2023-01-10 RX ORDER — LIDOCAINE HYDROCHLORIDE AND EPINEPHRINE 15; 5 MG/ML; UG/ML
INJECTION, SOLUTION EPIDURAL
Status: COMPLETED | OUTPATIENT
Start: 2023-01-10 | End: 2023-01-10

## 2023-01-10 RX ORDER — SODIUM CHLORIDE, SODIUM LACTATE, POTASSIUM CHLORIDE, AND CALCIUM CHLORIDE .6; .31; .03; .02 G/100ML; G/100ML; G/100ML; G/100ML
1000 INJECTION, SOLUTION INTRAVENOUS
Status: COMPLETED | OUTPATIENT
Start: 2023-01-10 | End: 2023-01-10

## 2023-01-10 RX ORDER — CALCIUM CARBONATE 500 MG/1
1000 TABLET, CHEWABLE ORAL EVERY 4 HOURS PRN
Status: DISCONTINUED | OUTPATIENT
Start: 2023-01-10 | End: 2023-01-13 | Stop reason: HOSPADM

## 2023-01-10 RX ORDER — ROPIVACAINE HYDROCHLORIDE 2 MG/ML
INJECTION, SOLUTION EPIDURAL; INFILTRATION PRN
Status: DISCONTINUED | OUTPATIENT
Start: 2023-01-10 | End: 2023-01-11 | Stop reason: SURG

## 2023-01-10 RX ADMIN — ONDANSETRON 4 MG: 2 INJECTION INTRAMUSCULAR; INTRAVENOUS at 11:19

## 2023-01-10 RX ADMIN — LIDOCAINE HYDROCHLORIDE,EPINEPHRINE BITARTRATE 5 ML: 15; .005 INJECTION, SOLUTION EPIDURAL; INFILTRATION; INTRACAUDAL; PERINEURAL at 01:34

## 2023-01-10 RX ADMIN — ROPIVACAINE HYDROCHLORIDE: 2 INJECTION, SOLUTION EPIDURAL; INFILTRATION at 09:04

## 2023-01-10 RX ADMIN — SODIUM CHLORIDE, POTASSIUM CHLORIDE, SODIUM LACTATE AND CALCIUM CHLORIDE 1000 ML: 600; 310; 30; 20 INJECTION, SOLUTION INTRAVENOUS at 00:45

## 2023-01-10 RX ADMIN — ROPIVACAINE HYDROCHLORIDE: 2 INJECTION, SOLUTION EPIDURAL; INFILTRATION at 22:12

## 2023-01-10 RX ADMIN — ROPIVACAINE HYDROCHLORIDE 200 MG: 2 INJECTION, SOLUTION EPIDURAL; INFILTRATION at 01:46

## 2023-01-10 RX ADMIN — BUPIVACAINE HYDROCHLORIDE 5 ML: 2.5 INJECTION, SOLUTION EPIDURAL; INFILTRATION; INTRACAUDAL; PERINEURAL at 11:47

## 2023-01-10 RX ADMIN — BUPIVACAINE HYDROCHLORIDE 10 ML: 2.5 INJECTION, SOLUTION EPIDURAL; INFILTRATION; INTRACAUDAL; PERINEURAL at 17:52

## 2023-01-10 RX ADMIN — CALCIUM CARBONATE 1000 MG: 500 TABLET, CHEWABLE ORAL at 14:23

## 2023-01-10 RX ADMIN — SODIUM CHLORIDE, POTASSIUM CHLORIDE, SODIUM LACTATE AND CALCIUM CHLORIDE: 600; 310; 30; 20 INJECTION, SOLUTION INTRAVENOUS at 10:12

## 2023-01-10 RX ADMIN — SODIUM CHLORIDE, POTASSIUM CHLORIDE, SODIUM LACTATE AND CALCIUM CHLORIDE: 600; 310; 30; 20 INJECTION, SOLUTION INTRAVENOUS at 18:15

## 2023-01-10 RX ADMIN — ONDANSETRON 4 MG: 4 TABLET, ORALLY DISINTEGRATING ORAL at 02:51

## 2023-01-10 RX ADMIN — LIDOCAINE HYDROCHLORIDE AND EPINEPHRINE 5 ML: 15; 5 INJECTION, SOLUTION EPIDURAL at 17:52

## 2023-01-10 RX ADMIN — SODIUM CHLORIDE, POTASSIUM CHLORIDE, SODIUM LACTATE AND CALCIUM CHLORIDE: 600; 310; 30; 20 INJECTION, SOLUTION INTRAVENOUS at 02:20

## 2023-01-10 RX ADMIN — HYDROXYZINE HYDROCHLORIDE 50 MG: 50 TABLET, FILM COATED ORAL at 15:06

## 2023-01-10 RX ADMIN — CALCIUM CARBONATE 1000 MG: 500 TABLET, CHEWABLE ORAL at 10:21

## 2023-01-10 RX ADMIN — BUPIVACAINE HYDROCHLORIDE 7 ML: 2.5 INJECTION, SOLUTION EPIDURAL; INFILTRATION; INTRACAUDAL; PERINEURAL at 10:56

## 2023-01-10 RX ADMIN — OXYTOCIN 13 MILLI-UNITS/MIN: 10 INJECTION, SOLUTION INTRAMUSCULAR; INTRAVENOUS at 18:42

## 2023-01-10 RX ADMIN — ROPIVACAINE HYDROCHLORIDE: 2 INJECTION, SOLUTION EPIDURAL; INFILTRATION at 15:40

## 2023-01-10 RX ADMIN — ROPIVACAINE HYDROCHLORIDE 10 ML: 5 INJECTION, SOLUTION EPIDURAL; INFILTRATION; PERINEURAL at 01:37

## 2023-01-10 NOTE — PROGRESS NOTES
Pt sent over from the office by Dr Yip for induction of labor. Pt reports good FM, denies LOF or VB. EFM/[place] applied. Order to start IV and send PIH labs.    SVE as charted. Unable to determine fetal presentation.     1735: Call made to Dr Fuentes with update on pt arrival. Awaiting pt labs.     1740: Report to Ivone NICOLE. POC discussed.

## 2023-01-10 NOTE — PROGRESS NOTES
"Hospital Day : 0    S: Good movement.  Not feeling uc.  Mild abdominal pain.  Denies HA or visual changes    O:  Vitals:    01/09/23 1629 01/09/23 1731 01/09/23 1813   BP: (!) 141/77 (!) 142/85 127/75   Pulse: 99 86 99   Resp: 17  18   Temp: 37.1 °C (98.7 °F)  37.4 °C (99.4 °F)   TempSrc: Temporal  Temporal   SpO2: 97%     Weight: 113 kg (250 lb)     Height: 1.676 m (5' 6\")         Recent Labs     01/09/23  1700   WBC 11.0*   RBC 4.59   HEMOGLOBIN 11.9*   HEMATOCRIT 37.3   MCV 81.3*   MCH 25.9*   MCHC 31.9*   RDW 48.0   PLATELETCT 311   MPV 11.7     Recent Labs     01/09/23  1700   SODIUM 135   POTASSIUM 4.1   CHLORIDE 104   CO2 16*   GLUCOSE 76   BUN 10   CREATININE 0.60   CALCIUM 9.7         Cat one; irreg uc q 3-6  2+ dtr  Cvx 1/50/-2    A: iup at 35.6 with PIH with severe features (elevated LFT and Abdominal pain; A1 diabetes; class 3 obesity; GBS neg; fetal status reassuring    P: DW course of care; balloon placed 70/70 and will start pit; hypertensive protocol; mg prn    "

## 2023-01-10 NOTE — CARE PLAN
Problem: Psychosocial - L&D  Goal: Patient's level of anxiety will decrease  Outcome: Progressing     Problem: Risk for Fluid Imbalance  Goal: Patient's fluid volume balance will be maintained or improve  Outcome: Progressing     Problem: Pain  Goal: Patient's pain will be alleviated or reduced to the patient’s comfort goal  Outcome: Progressing   The patient is Stable - Low risk of patient condition declining or worsening    Shift Goals  Clinical Goals: Cervical change and manage GDM  Patient Goals: Healthy mom  Family Goals: healthy baby

## 2023-01-10 NOTE — PROGRESS NOTES
1900: Received bedside report from Ivone NICOLE; PT is a , EDC of 2 with a GA of 35w6d, RN assuming care of patient; all questions answered; pt resting comfortably in bed with significant other at bedside; denies needs at this time; VS in stable condition; pt educated regarding call light system; call light and pt belongings in reach, pt encouraged to call RN for any needs, wants, desires or concerns. Whiteboard updated.     : Alfredo HAYNES called to receive orders    0040: Patient requests an epidural    0114: Ross HAYNES called to bedside for placement of epidural    0700: Gave report to Johanne NICOLE. POC discussed.

## 2023-01-10 NOTE — CARE PLAN
Problem: Risk for Infection and Impaired Wound Healing  Goal: Patient will remain free from infection  Outcome: Progressing     Problem: Pain  Goal: Patient's pain will be alleviated or reduced to the patient’s comfort goal  Outcome: Progressing  Flowsheets (Taken 1/10/2023 0804)  Pain Rating Scale (NPRS): 0  OB Pain Level: 0-No Pain  OB Pain Intervention: Epidural   The patient is Watcher - Medium risk of patient condition declining or worsening    Shift Goals  Clinical Goals: Cervical change and manage GDM  Patient Goals: Healthy mom  Family Goals: healthy baby    Progress made toward(s) clinical / shift goals:  Dilation and delivery

## 2023-01-10 NOTE — PROGRESS NOTES
0700: Assumed care of pt. AAO, pt comfortable with an epidural in place. POC discussed, pt verbalized understanding. FOB at bedside.

## 2023-01-10 NOTE — ANESTHESIA PROCEDURE NOTES
Epidural Block    Date/Time: 1/10/2023 1:24 AM  Performed by: Daljit Hawkins M.D.  Authorized by: Daljit Hawkins M.D.     Patient Location:  OB  Start Time:  1/10/2023 1:24 AM  End Time:  1/10/2023 1:34 AM  Reason for Block: labor analgesia    patient identified, IV checked, site marked, risks and benefits discussed, surgical consent, monitors and equipment checked, pre-op evaluation and timeout performed    Patient Position:  Sitting  Prep: ChloraPrep, patient draped and sterile technique    Monitoring:  Blood pressure, continuous pulse oximetry and heart rate  Approach:  Midline  Location:  L3-L4  Injection Technique:  JAZZMINE saline  Skin infiltration:  Lidocaine  Strength:  1%  Dose:  5ml  Needle Type:  Tuohy  Needle Gauge:  17 G  Needle Length:  3.5 in  Loss of resistance::  6.5  Catheter Size:  19 G  Catheter at Skin Depth:  12  Test Dose Result:  Negative  Events: paresthesia-transient/resolved     Several passes to bone, 2 sites attempted

## 2023-01-10 NOTE — ANESTHESIA PREPROCEDURE EVALUATION
Date: 01/10/23  Procedure: Labor Epidural         Relevant Problems   NEURO   (positive) Intractable migraine with aura without status migrainosus   (positive) Migraine without status migrainosus, not intractable      CARDIAC   (positive) Intractable migraine with aura without status migrainosus   (positive) Migraine without status migrainosus, not intractable   fibromyalgia, chronic pain    Physical Exam    Airway   Mallampati: II  TM distance: >3 FB  Neck ROM: full       Cardiovascular - normal exam  Rhythm: regular  Rate: normal  (-) murmur     Dental - normal exam           Pulmonary - normal exam  Breath sounds clear to auscultation     Abdominal    Neurological - normal exam                 Anesthesia Plan    ASA 2       Plan - epidural   Neuraxial block will be labor analgesia                  Pertinent diagnostic labs and testing reviewed    Informed Consent:    Anesthetic plan and risks discussed with patient.

## 2023-01-10 NOTE — H&P
DATE OF ADMISSION:  2023     HISTORY OF PRESENT ILLNESS:  The patient is a 27-year-old female  1,   para 0, who presented to my office at 35 and 6/7 weeks' gestation for OB check   as well as fetal nonstress test.  She was seen last week and evaluated and   found to have elevated blood pressure and was sent to the hospital where she   was found to have elevated liver function enzymes, but normal blood pressures and a 24 hr urine showed >300mg protein despite normal blood pressures.  She was also diagnosed with polyhydramnios.  She has also been diagnosed   with a recent gestational diabetes, but has not yet had her nutritional   consultation and monitoring, which would have been scheduled for later this   Week with [facility].  She does have a history of depression, anxiety and migraine headaches,   irritable bowel syndrome and MTHFR defect.  She had a normal first trimester   screening.  Her fetal survey showed an anterior placenta, male infant.  She   has had her Tdap and her flu shot.      Steroids were not completed secondary to her gestational diabetes per Dr Burns's recommendations when she was hospitalized last week, but again   she was diagnosed with polyhydramnios.  She had a 24-hour urine collection   that was over 300 mg.  She had serial liver function enzymes drawn and they   were still steadily increasing higher, but again blood pressure and platelets were and are still normal. Hepatitis panel was done and was negative.  She was therefore discharged to outpatient, was to follow up today and again   on Friday as [facility].  Dr. Burns had verbally   consulted, but did not see her in person for the recommendations.  Today, her   blood pressure is normal.  Her fetal nonstress test is reassuring and she is   radha some.  Her group B strep is negative, but she is starting to have   some right upper quadrant pain.  She was therefore sent back to labor and   delivery for  induction of labor and repeat of her labs.  Her blood pressure in the   office was still normal, but on the high end.  Her cervix was not checked   today.  Last week, it was 1 cm per patient report.     PAST MEDICAL HISTORY:  Again includes irritable bowel syndrome, depression,   anxiety and migraine headaches.     PAST SURGICAL HISTORY:  Includes right knee surgery in 2009.     FAMILY HISTORY:  Includes mother with blood clotting disorder, MTHFR,   hypertension, hypercholesterolemia and a half-brother with hemophilia.     SOCIAL HISTORY:  She is .  Her 's name is Danie.  No alcohol,   tobacco or history of drug use.     ALLERGIES:  SEPTRA, WHICH CAUSES A RASH.  PENICILLIN CAUSES A RASH AND   SHELLFISH.     GYNECOLOGIC HISTORY:  Last menstrual period 2022.  She has no history of   abnormal Paps or STDs including no history of herpes simplex virus.     OBSTETRIC HISTORY:  .     CURRENT MEDICATIONS:  Include only prenatal vitamins.     PHYSICAL EXAMINATION:    VITAL SIGNS:  In the office, blood pressure was 128/82, weight 251 pounds,   spilling 1+ protein on urine dip.  GENERAL:  Awake, alert, oriented.  She is in no acute distress.  CARDIOVASCULAR:  Regular rate and rhythm.  CHEST:  Clear to auscultation bilaterally.  ABDOMEN:  Gravid, soft.  No pinpoint pain, specifically to right upper   quadrant pain on palpation.  EXTREMITIES:  1+ edema bilateral lower extremities.  PELVIC:  Sterile vaginal exam was deferred.  Fetal heart rate tracing and   fetal nonstress test, category 1, reactive, moderate variability, no   decelerations.  She had a low baseline about 118 and having some occasional   contractions.     LABORATORY DATA:  She is O positive.  Rubella is immune, RPR nonreactive,   hepatitis B surface antigen negative, hepatitis C antibody negative.  Elevated   1-hour glucose and abnormal 3-hour which makes her gestational diabetic, low   vitamin D.  Pap was normal and HPV high risk negative.   Group B strep is   negative and most current blood work; hemoglobin 11.9, hematocrit 37.3,   platelet count 311,000.  Creatinine is 0.6.  AST is 75, which is the same as   it was on the 6th and ALT has increased to 165, it was 140 on the 6th.    Alkaline phosphatase 180.  Uric acid 5.1 and a protein creatinine ratio today   is 199.     ASSESSMENT AND PLAN:  A 27-year-old  at 35 and 6/7 weeks' gestation.  1.  She has atypical variation of preeclampsia with increasing liver function   enzymes and now pain in right upper quadrant.  We will proceed and move   towards delivery at this time due to her worsening labs despite prematurity and also in light her development of pain in the ruq.  Dr. Fuentes is on at [facility] and we will reevaluate her once all the labs are back and her cervix has been checked and evaluate if she can be induced or if she needs to proceed with a .    2. polyhydramnios  3. GBS negative  4  Gestational diabetes, fair control.  She just had not done in time to get   her nutritional counseling and know what her sugars have been running.  She   has not been taking them consistently at home.  3.  Maternal obesity.        ______________________________  MD ANA PICKENS/PARVIZ/ARIN    DD:  2023 18:24  DT:  2023 20:15    Job#:  [id number]

## 2023-01-10 NOTE — PROGRESS NOTES
Report received from Ivelisse BUNCH RN, POC discussed and care assumed.    Pt is a  with ROMULO of 23, making her 35w6d. GBS Neg. GDM positive.    Dr. Fuentes at bedside for SVE and to place obstetric balloon, see LDA.    Report given to Adelaida NICOLE, POC discussed and care relinquished.

## 2023-01-11 LAB
ALBUMIN SERPL BCP-MCNC: 2.5 G/DL (ref 3.2–4.9)
ALBUMIN SERPL BCP-MCNC: 2.9 G/DL (ref 3.2–4.9)
ALBUMIN/GLOB SERPL: 0.8 G/DL
ALBUMIN/GLOB SERPL: 1 G/DL
ALP SERPL-CCNC: 145 U/L (ref 30–99)
ALP SERPL-CCNC: 159 U/L (ref 30–99)
ALT SERPL-CCNC: 123 U/L (ref 2–50)
ALT SERPL-CCNC: 163 U/L (ref 2–50)
ANION GAP SERPL CALC-SCNC: 11 MMOL/L (ref 7–16)
ANION GAP SERPL CALC-SCNC: 15 MMOL/L (ref 7–16)
AST SERPL-CCNC: 54 U/L (ref 12–45)
AST SERPL-CCNC: 80 U/L (ref 12–45)
BILIRUB SERPL-MCNC: 0.4 MG/DL (ref 0.1–1.5)
BILIRUB SERPL-MCNC: 1 MG/DL (ref 0.1–1.5)
BUN SERPL-MCNC: 11 MG/DL (ref 8–22)
BUN SERPL-MCNC: 9 MG/DL (ref 8–22)
CALCIUM ALBUM COR SERPL-MCNC: 10 MG/DL (ref 8.5–10.5)
CALCIUM ALBUM COR SERPL-MCNC: 10 MG/DL (ref 8.5–10.5)
CALCIUM SERPL-MCNC: 8.8 MG/DL (ref 8.5–10.5)
CALCIUM SERPL-MCNC: 9.1 MG/DL (ref 8.5–10.5)
CHLORIDE SERPL-SCNC: 106 MMOL/L (ref 96–112)
CHLORIDE SERPL-SCNC: 108 MMOL/L (ref 96–112)
CO2 SERPL-SCNC: 15 MMOL/L (ref 20–33)
CO2 SERPL-SCNC: 18 MMOL/L (ref 20–33)
CREAT SERPL-MCNC: 0.76 MG/DL (ref 0.5–1.4)
CREAT SERPL-MCNC: 0.87 MG/DL (ref 0.5–1.4)
ERYTHROCYTE [DISTWIDTH] IN BLOOD BY AUTOMATED COUNT: 49.6 FL (ref 35.9–50)
ERYTHROCYTE [DISTWIDTH] IN BLOOD BY AUTOMATED COUNT: 50.6 FL (ref 35.9–50)
GFR SERPLBLD CREATININE-BSD FMLA CKD-EPI: 110 ML/MIN/1.73 M 2
GFR SERPLBLD CREATININE-BSD FMLA CKD-EPI: 93 ML/MIN/1.73 M 2
GLOBULIN SER CALC-MCNC: 2.9 G/DL (ref 1.9–3.5)
GLOBULIN SER CALC-MCNC: 3.1 G/DL (ref 1.9–3.5)
GLUCOSE BLD STRIP.AUTO-MCNC: 108 MG/DL (ref 65–99)
GLUCOSE BLD STRIP.AUTO-MCNC: 133 MG/DL (ref 65–99)
GLUCOSE SERPL-MCNC: 147 MG/DL (ref 65–99)
GLUCOSE SERPL-MCNC: 171 MG/DL (ref 65–99)
HCT VFR BLD AUTO: 28 % (ref 37–47)
HCT VFR BLD AUTO: 32.4 % (ref 37–47)
HGB BLD-MCNC: 10.2 G/DL (ref 12–16)
HGB BLD-MCNC: 8.9 G/DL (ref 12–16)
MAGNESIUM SERPL-MCNC: 1.4 MG/DL (ref 1.5–2.5)
MAGNESIUM SERPL-MCNC: 3.4 MG/DL (ref 1.5–2.5)
MAGNESIUM SERPL-MCNC: 4.4 MG/DL (ref 1.5–2.5)
MCH RBC QN AUTO: 25.9 PG (ref 27–33)
MCH RBC QN AUTO: 26 PG (ref 27–33)
MCHC RBC AUTO-ENTMCNC: 31.5 G/DL (ref 33.6–35)
MCHC RBC AUTO-ENTMCNC: 31.8 G/DL (ref 33.6–35)
MCV RBC AUTO: 81.4 FL (ref 81.4–97.8)
MCV RBC AUTO: 82.4 FL (ref 81.4–97.8)
PLATELET # BLD AUTO: 293 K/UL (ref 164–446)
PLATELET # BLD AUTO: 304 K/UL (ref 164–446)
PMV BLD AUTO: 11.5 FL (ref 9–12.9)
PMV BLD AUTO: 11.8 FL (ref 9–12.9)
POTASSIUM SERPL-SCNC: 3.3 MMOL/L (ref 3.6–5.5)
POTASSIUM SERPL-SCNC: 3.8 MMOL/L (ref 3.6–5.5)
PROT SERPL-MCNC: 5.6 G/DL (ref 6–8.2)
PROT SERPL-MCNC: 5.8 G/DL (ref 6–8.2)
RBC # BLD AUTO: 3.44 M/UL (ref 4.2–5.4)
RBC # BLD AUTO: 3.93 M/UL (ref 4.2–5.4)
SODIUM SERPL-SCNC: 136 MMOL/L (ref 135–145)
SODIUM SERPL-SCNC: 137 MMOL/L (ref 135–145)
WBC # BLD AUTO: 17.1 K/UL (ref 4.8–10.8)
WBC # BLD AUTO: 21.4 K/UL (ref 4.8–10.8)

## 2023-01-11 PROCEDURE — 80053 COMPREHEN METABOLIC PANEL: CPT

## 2023-01-11 PROCEDURE — 85027 COMPLETE CBC AUTOMATED: CPT

## 2023-01-11 PROCEDURE — 700105 HCHG RX REV CODE 258: Performed by: OBSTETRICS & GYNECOLOGY

## 2023-01-11 PROCEDURE — 700102 HCHG RX REV CODE 250 W/ 637 OVERRIDE(OP): Performed by: OBSTETRICS & GYNECOLOGY

## 2023-01-11 PROCEDURE — 700111 HCHG RX REV CODE 636 W/ 250 OVERRIDE (IP): Performed by: OBSTETRICS & GYNECOLOGY

## 2023-01-11 PROCEDURE — A9270 NON-COVERED ITEM OR SERVICE: HCPCS | Performed by: OBSTETRICS & GYNECOLOGY

## 2023-01-11 PROCEDURE — 82962 GLUCOSE BLOOD TEST: CPT

## 2023-01-11 PROCEDURE — 0KQM0ZZ REPAIR PERINEUM MUSCLE, OPEN APPROACH: ICD-10-PCS | Performed by: OBSTETRICS & GYNECOLOGY

## 2023-01-11 PROCEDURE — 36415 COLL VENOUS BLD VENIPUNCTURE: CPT

## 2023-01-11 PROCEDURE — 304965 HCHG RECOVERY SERVICES

## 2023-01-11 PROCEDURE — 770002 HCHG ROOM/CARE - OB PRIVATE (112)

## 2023-01-11 PROCEDURE — 83735 ASSAY OF MAGNESIUM: CPT | Mod: 91

## 2023-01-11 PROCEDURE — 700101 HCHG RX REV CODE 250: Performed by: OBSTETRICS & GYNECOLOGY

## 2023-01-11 PROCEDURE — 59409 OBSTETRICAL CARE: CPT

## 2023-01-11 RX ORDER — CALCIUM CARBONATE 500 MG/1
1000 TABLET, CHEWABLE ORAL EVERY 6 HOURS PRN
Status: DISCONTINUED | OUTPATIENT
Start: 2023-01-11 | End: 2023-01-13 | Stop reason: HOSPADM

## 2023-01-11 RX ORDER — CALCIUM GLUCONATE 94 MG/ML
1 INJECTION, SOLUTION INTRAVENOUS
Status: DISCONTINUED | OUTPATIENT
Start: 2023-01-11 | End: 2023-01-13 | Stop reason: HOSPADM

## 2023-01-11 RX ORDER — OXYCODONE HYDROCHLORIDE 5 MG/1
5 TABLET ORAL EVERY 4 HOURS PRN
Status: DISCONTINUED | OUTPATIENT
Start: 2023-01-11 | End: 2023-01-13 | Stop reason: HOSPADM

## 2023-01-11 RX ORDER — VITAMIN A ACETATE, BETA CAROTENE, ASCORBIC ACID, CHOLECALCIFEROL, .ALPHA.-TOCOPHEROL ACETATE, DL-, THIAMINE MONONITRATE, RIBOFLAVIN, NIACINAMIDE, PYRIDOXINE HYDROCHLORIDE, FOLIC ACID, CYANOCOBALAMIN, CALCIUM CARBONATE, FERROUS FUMARATE, ZINC OXIDE, CUPRIC OXIDE 3080; 12; 120; 400; 1; 1.84; 3; 20; 22; 920; 25; 200; 27; 10; 2 [IU]/1; UG/1; MG/1; [IU]/1; MG/1; MG/1; MG/1; MG/1; MG/1; [IU]/1; MG/1; MG/1; MG/1; MG/1; MG/1
1 TABLET, FILM COATED ORAL
Status: DISCONTINUED | OUTPATIENT
Start: 2023-01-12 | End: 2023-01-13 | Stop reason: HOSPADM

## 2023-01-11 RX ORDER — SODIUM CHLORIDE, SODIUM LACTATE, POTASSIUM CHLORIDE, CALCIUM CHLORIDE 600; 310; 30; 20 MG/100ML; MG/100ML; MG/100ML; MG/100ML
INJECTION, SOLUTION INTRAVENOUS PRN
Status: DISCONTINUED | OUTPATIENT
Start: 2023-01-11 | End: 2023-01-13 | Stop reason: HOSPADM

## 2023-01-11 RX ORDER — DOCUSATE SODIUM 100 MG/1
100 CAPSULE, LIQUID FILLED ORAL 2 TIMES DAILY PRN
Status: DISCONTINUED | OUTPATIENT
Start: 2023-01-11 | End: 2023-01-12

## 2023-01-11 RX ORDER — ACETAMINOPHEN 500 MG
1000 TABLET ORAL
Status: CANCELLED | OUTPATIENT
Start: 2023-01-11

## 2023-01-11 RX ORDER — MAGNESIUM SULFATE HEPTAHYDRATE 40 MG/ML
2 INJECTION, SOLUTION INTRAVENOUS CONTINUOUS
Status: DISCONTINUED | OUTPATIENT
Start: 2023-01-11 | End: 2023-01-12

## 2023-01-11 RX ORDER — IBUPROFEN 800 MG/1
800 TABLET ORAL EVERY 8 HOURS PRN
Status: DISCONTINUED | OUTPATIENT
Start: 2023-01-11 | End: 2023-01-13 | Stop reason: HOSPADM

## 2023-01-11 RX ORDER — CALCIUM GLUCONATE 94 MG/ML
1 INJECTION, SOLUTION INTRAVENOUS
Status: DISCONTINUED | OUTPATIENT
Start: 2023-01-11 | End: 2023-01-12

## 2023-01-11 RX ORDER — SODIUM CHLORIDE, SODIUM LACTATE, POTASSIUM CHLORIDE, CALCIUM CHLORIDE 600; 310; 30; 20 MG/100ML; MG/100ML; MG/100ML; MG/100ML
INJECTION, SOLUTION INTRAVENOUS CONTINUOUS
Status: DISCONTINUED | OUTPATIENT
Start: 2023-01-11 | End: 2023-01-12

## 2023-01-11 RX ORDER — MISOPROSTOL 200 UG/1
800 TABLET ORAL
Status: DISCONTINUED | OUTPATIENT
Start: 2023-01-11 | End: 2023-01-13 | Stop reason: HOSPADM

## 2023-01-11 RX ORDER — DEXTROSE, SODIUM CHLORIDE, SODIUM LACTATE, POTASSIUM CHLORIDE, AND CALCIUM CHLORIDE 5; .6; .31; .03; .02 G/100ML; G/100ML; G/100ML; G/100ML; G/100ML
INJECTION, SOLUTION INTRAVENOUS CONTINUOUS
Status: DISCONTINUED | OUTPATIENT
Start: 2023-01-11 | End: 2023-01-11

## 2023-01-11 RX ORDER — SIMETHICONE 125 MG
125 TABLET,CHEWABLE ORAL 4 TIMES DAILY PRN
Status: DISCONTINUED | OUTPATIENT
Start: 2023-01-11 | End: 2023-01-13 | Stop reason: HOSPADM

## 2023-01-11 RX ORDER — MAGNESIUM SULFATE HEPTAHYDRATE 40 MG/ML
4 INJECTION, SOLUTION INTRAVENOUS ONCE
Status: COMPLETED | OUTPATIENT
Start: 2023-01-11 | End: 2023-01-11

## 2023-01-11 RX ADMIN — NIFEDIPINE 10 MG: 10 CAPSULE ORAL at 04:35

## 2023-01-11 RX ADMIN — OXYTOCIN 125 ML/HR: 10 INJECTION, SOLUTION INTRAMUSCULAR; INTRAVENOUS at 06:01

## 2023-01-11 RX ADMIN — MAGNESIUM SULFATE HEPTAHYDRATE 2 G/HR: 40 INJECTION, SOLUTION INTRAVENOUS at 13:46

## 2023-01-11 RX ADMIN — IBUPROFEN 800 MG: 800 TABLET, FILM COATED ORAL at 06:06

## 2023-01-11 RX ADMIN — OXYTOCIN 125 ML/HR: 10 INJECTION, SOLUTION INTRAMUSCULAR; INTRAVENOUS at 06:00

## 2023-01-11 RX ADMIN — SODIUM CHLORIDE, SODIUM LACTATE, POTASSIUM CHLORIDE, CALCIUM CHLORIDE AND DEXTROSE MONOHYDRATE: 5; 600; 310; 30; 20 INJECTION, SOLUTION INTRAVENOUS at 03:13

## 2023-01-11 RX ADMIN — ONDANSETRON 4 MG: 2 INJECTION INTRAMUSCULAR; INTRAVENOUS at 00:02

## 2023-01-11 RX ADMIN — IBUPROFEN 800 MG: 800 TABLET, FILM COATED ORAL at 17:28

## 2023-01-11 RX ADMIN — SODIUM CHLORIDE, POTASSIUM CHLORIDE, SODIUM LACTATE AND CALCIUM CHLORIDE: 600; 310; 30; 20 INJECTION, SOLUTION INTRAVENOUS at 12:36

## 2023-01-11 RX ADMIN — LIDOCAINE HYDROCHLORIDE 20 ML: 10 INJECTION, SOLUTION INFILTRATION; PERINEURAL at 05:03

## 2023-01-11 RX ADMIN — HYDROXYZINE HYDROCHLORIDE 50 MG: 50 TABLET, FILM COATED ORAL at 19:04

## 2023-01-11 RX ADMIN — CALCIUM CARBONATE 1000 MG: 500 TABLET, CHEWABLE ORAL at 11:50

## 2023-01-11 RX ADMIN — MAGNESIUM SULFATE HEPTAHYDRATE 4 G: 40 INJECTION, SOLUTION INTRAVENOUS at 13:25

## 2023-01-11 RX ADMIN — SODIUM CHLORIDE, SODIUM LACTATE, POTASSIUM CHLORIDE, CALCIUM CHLORIDE AND DEXTROSE MONOHYDRATE: 5; 600; 310; 30; 20 INJECTION, SOLUTION INTRAVENOUS at 03:15

## 2023-01-11 ASSESSMENT — PAIN DESCRIPTION - PAIN TYPE
TYPE: ACUTE PAIN

## 2023-01-11 NOTE — PROGRESS NOTES
1900: Received report from Johanne NICOLE. POC discussed.     2210: Pt is complete.    2213: Update Phi HAYNES on pt status    2222: Pt starts pushing    2238: Pt stopped pushing due to high station. Will resume pushing at a later time.     2335: Phi HAYNES at bedside. SVE: 10/100/+1    2350: Gave report to Buffy NICOLE. POC discussed.

## 2023-01-11 NOTE — PROGRESS NOTES
0700- Report received. Care assumed. G1 admitted two days ago for preeclampsia with severe features and diet controlled GDM. Received one dose of Nifiedipine during pushing. Delivered spontaneously at 0459 with 2nd degree vaginal lac. Infant skin to skin with good latch. Mother looks and feels well. FOB supportive at bedside. Awaiting repeat labs. POC discussed.   0830- Finished breakfast. Lab at bedside for Infant DS. Up to bathroom, steady, +large void. Pads changed and Tucks and spray administered. Back to bed.   0920- Call to Dr. Toro with CMP results. Pt reports her HA resolved with the Ibuprofen and Diet Coke. Denies RUQ pain. Chart reviewed. Okay to transfer to . May still consider starting Mag Sulfate.  0930- To  via w/c. Report to BONNIE Baez.   1211 On PP to start Magnesium. Call to Dr. Toro for orders. Orders for 4gram bolus/2grams per hour and pt may have BRP with assist.

## 2023-01-11 NOTE — ANESTHESIA POSTPROCEDURE EVALUATION
Patient: Kelvin Riggins    Procedure Summary     Date: 01/10/23 Room / Location:     Anesthesia Start: 0124 Anesthesia Stop: 01/11/23 0459    Procedure: Labor Epidural Diagnosis:     Scheduled Providers:  Responsible Provider: Adrian Cleary M.D.    Anesthesia Type: epidural ASA Status: 2          Final Anesthesia Type: epidural  Last vitals  BP   Blood Pressure: (!) 141/68    Temp   37.5 °C (99.5 °F)    Pulse   96   Resp   18    SpO2   95 %      Anesthesia Post Evaluation    Patient location during evaluation: bedside  Patient participation: complete - patient participated  Level of consciousness: awake and alert    Airway patency: patent  Anesthetic complications: no  Cardiovascular status: hemodynamically stable  Respiratory status: acceptable  Hydration status: euvolemic    PONV: none    patient able to participate, but full recovery from regional anesthesia has not occurred and is not expected within the stipulated timeframe for the completion of the evaluation      No notable events documented.     Nurse Pain Score: 0 (NPRS)

## 2023-01-11 NOTE — CARE PLAN
Problem: Knowledge Deficit - L&D  Goal: Patient and family/caregivers will demonstrate understanding of plan of care, disease process/condition, diagnostic tests and medications  Outcome: Progressing     Problem: Psychosocial - L&D  Goal: Patient's level of anxiety will decrease  Outcome: Progressing     Problem: Pain  Goal: Patient's pain will be alleviated or reduced to the patient’s comfort goal  Outcome: Progressing   The patient is Stable - Low risk of patient condition declining or worsening    Shift Goals  Clinical Goals: cervical change  Patient Goals: healthy mom  Family Goals: healthy baby

## 2023-01-11 NOTE — PROGRESS NOTES
Labor Progress Note    Kelvin Riggins   36w1d      Subjective:  More comfortable after epidural was replaced.  Now complete  Objective:   Vitals:    01/10/23 2326 01/10/23 2346 01/11/23 0028 01/11/23 0100   BP: 133/66 (!) 141/68     Pulse: (!) 108 96     Resp:       Temp:   37.2 °C (98.9 °F) 37.6 °C (99.6 °F)   TempSrc:   Temporal Temporal   SpO2:       Weight:       Height:         SVE: c/100/0-+1 station  [place] q3min  FHT: cat 1 , mod variability, occ early decels present   Ext: no calf pain maribel LE    Labs:  Recent Results (from the past 24 hour(s))   POCT glucose device results    Collection Time: 01/10/23  5:06 AM   Result Value Ref Range    POC Glucose, Blood 90 65 - 99 mg/dL   POCT glucose device results    Collection Time: 01/10/23  7:56 AM   Result Value Ref Range    POC Glucose, Blood 87 65 - 99 mg/dL   POCT glucose device results    Collection Time: 01/10/23  1:43 PM   Result Value Ref Range    POC Glucose, Blood 72 65 - 99 mg/dL   POCT glucose device results    Collection Time: 01/10/23  5:33 PM   Result Value Ref Range    POC Glucose, Blood 99 65 - 99 mg/dL   POCT glucose device results    Collection Time: 01/10/23  9:20 PM   Result Value Ref Range    POC Glucose, Blood 95 65 - 99 mg/dL   POCT glucose device results    Collection Time: 01/11/23 12:10 AM   Result Value Ref Range    POC Glucose, Blood 108 (H) 65 - 99 mg/dL       Assessment: Plan  36w1d  Pre-e with severe features:   Bp stable  Now complete but will labor down another 1/2 hour and then start pushing.   Fetal tracing overall reassuring.       Annmarie Yip M.D.

## 2023-01-11 NOTE — L&D DELIVERY NOTE
DATE OF SERVICE:  01/11/2023     PREOPERATIVE DIAGNOSES:    1.  Intrauterine pregnancy at 36 weeks' gestation.  2.  Preeclampsia with severe features.     POSTOPERATIVE DIAGNOSES:    1.  Intrauterine pregnancy at 36 weeks' gestation.  2.  Preeclampsia with severe features.     PROCEDURE:  Normal spontaneous vaginal delivery.     DELIVERING PHYSICIAN:  Annmarie Yip MD     ANESTHESIOLOGIST:  Adrian Cleary MD     ANESTHESIA:  Epidural.     COMPLICATIONS:  None.     ESTIMATED BLOOD LOSS:  400 mL     FINDINGS:  Male infant, cephalic presentation, clear amniotic fluid, Apgars 8   and 9, weight is pending at the time of this dictation.  Intact placenta,   3-vessel cord, second-degree midline vaginal laceration repaired with 3-0   Vicryl and local lidocaine.  Sponge, laps and needle counts were correct x3.    Rectum and sphincter intact.  Baby did have caput and some scalp edema from   delivery.     DESCRIPTION OF PROCEDURE:  The patient was brought in secondary to   significantly elevated LFTs at 35 and 6/7th weeks' gestation and diagnosed   with severe preeclampsia based on this and pain in her right upper quadrant.    She was induced.  She had a Devine, Cook catheter balloon.  Once that came out,   she was augmented with Pitocin.  She had artificial rupture of membranes and   had polyhydramnios, clear fluid.  She was internalized and then slowly went to   complete.  Maximum, she was 26 milliunits of Pitocin in an hour.  She pushed   for 4 hours, but had a vaginal delivery over a second-degree midline   laceration.  Once the anterior shoulder delivered, the rest of baby delivered   uneventfully.  Baby was placed on maternal abdomen with awaiting delivery team   nurse.  Cord was clamped x2 and cut by father of the baby after a 30-second   cord clamp delay.  The placenta delivered spontaneously after 30 minutes with   maternal expulsive efforts.  Repaired with 1% local lidocaine and a 2-0   Vicryl.  Sponge, laps and needle  counts were correct x3.  Fundus was firm,   bleeding scant.  The patient had some elevated blood pressures while pushing   in the last several hours, but is normal now that she is not pushing, she did   not require magnesium sulfate thus far.  We will continue to watch her blood   pressures, we will repeat her labs now.  She still may need magnesium sulfate.        ______________________________  MD ANA PICKENS/MARIA EUGENIA/JAN    DD:  01/11/2023 06:01  DT:  01/11/2023 06:21    Job#:  [id number]

## 2023-01-11 NOTE — PROGRESS NOTES
0997 Assume care from L&D. Oriented patient to room,call light, and emergancy light. Assessment completed,fundus firm,lochia light. Plan of care reviewed, verbilized understanding. Patient denies pain at this time, will call if intervention needed.  1155 Dr. Toro at bedside, gave verbal order to start Magnesium infusion over next 24hrs.   1210 RN Gabby at bedside to assist w/ Magnesium administration.  1325 LR running. Magnesium 4g bolus started. VS stable, patient tolerating well.   1346 Magnesium rate changed to 2g/hr.

## 2023-01-11 NOTE — ANESTHESIA PROCEDURE NOTES
Epidural Block    Date/Time: 1/10/2023 5:52 PM  Performed by: Adrian Cleary M.D.  Authorized by: Adrian Cleary M.D.     Patient Location:  OB  Start Time:  1/10/2023 5:52 PM  End Time:  1/10/2023 6:07 PM  Reason for Block: labor analgesia    patient identified, IV checked, site marked, risks and benefits discussed, surgical consent, monitors and equipment checked, pre-op evaluation and timeout performed    Patient Position:  Sitting  Prep: ChloraPrep, patient draped and sterile technique    Monitoring:  Blood pressure, continuous pulse oximetry and heart rate  Approach:  Midline  Location:  L3-L4  Injection Technique:  JAZZMINE saline  Skin infiltration:  Lidocaine  Strength:  1%  Dose:  3ml  Needle Type:  Tuohy  Needle Gauge:  17 G  Needle Length:  3.5 in  Loss of resistance::  5  Catheter Size:  19 G  Catheter at Skin Depth:  13  Test Dose Result:  Negative  Events: paresthesia-transient/resolved     Pt continues to c/o contraction pain and ok with replacing.  Removed original epidural catheter; tip intact.  Went to level below the original epidural site. Transient paresthesia.  Good JAZZMINE. Performed dural puncture using 27G Pencan spinal needle and obtained CSF.  Bolus w/ Bupiv 0.125% 5mL x2.

## 2023-01-11 NOTE — PROGRESS NOTES
0001 report received from Adelaida NICOLE. Patient is calm, spouse is in room. Pt. Sitting in high throne position.     0038 started pushing, notified provider    0231 pitocin increased per provider to 26. Patient tolerating well. Pushing with spouse and this RN.    0300 Pt. Mother arrived, support provided pushing. Education provided regarding pushing/breathing. Squat bar provided with sheet.     0435 nifedipine provided for elevated /76. Provider notified.     0436 provider evaluating Pt.    0459 [place] delivered    0531 Placenta delivered.     0700 End of shift report given to SHARRI NICOLE. [place] is pink being held by mother and breastfeeding. No s/s of distress. Mother is calm, alert. Spouse is in room. Plan of care discussed and all questions answered.

## 2023-01-11 NOTE — PROGRESS NOTES
L&D Progress Note    Name:   Kelvin Riggins   Date/Time:  1/10/2023 4:17 PM  Gestational Age:  36w0d  Admit Date:   1/9/2023  Admitting Dx:   Indication for care in labor or delivery [O75.9]    Subjective:  States she was more comfortable but shes intermittently still emotional and tearful.     Objective:   Vitals:    01/10/23 1250 01/10/23 1305 01/10/23 1320 01/10/23 1335   BP: 123/60 135/60 124/58 126/72   Pulse: 81 89 80 95   Resp:       Temp:       TempSrc:       SpO2:       Weight:       Height:         SVE: 4-5/75/-3  Arom clear fluid copious amt with FSC  IUPC placed without difficulty  Exam was still a lot more uncomfortable than would be expected with an epidural.   FHT: cat 1, moderate variability, no decels  [place] q2-3 min    Labs:  Recent Results (from the past 72 hour(s))   CBC WITHOUT DIFFERENTIAL    Collection Time: 01/09/23  5:00 PM   Result Value Ref Range    WBC 11.0 (H) 4.8 - 10.8 K/uL    RBC 4.59 4.20 - 5.40 M/uL    Hemoglobin 11.9 (L) 12.0 - 16.0 g/dL    Hematocrit 37.3 37.0 - 47.0 %    MCV 81.3 (L) 81.4 - 97.8 fL    MCH 25.9 (L) 27.0 - 33.0 pg    MCHC 31.9 (L) 33.6 - 35.0 g/dL    RDW 48.0 35.9 - 50.0 fL    Platelet Count 311 164 - 446 K/uL    MPV 11.7 9.0 - 12.9 fL   Comp Metabolic Panel    Collection Time: 01/09/23  5:00 PM   Result Value Ref Range    Sodium 135 135 - 145 mmol/L    Potassium 4.1 3.6 - 5.5 mmol/L    Chloride 104 96 - 112 mmol/L    Co2 16 (L) 20 - 33 mmol/L    Anion Gap 15.0 7.0 - 16.0    Glucose 76 65 - 99 mg/dL    Bun 10 8 - 22 mg/dL    Creatinine 0.60 0.50 - 1.40 mg/dL    Calcium 9.7 8.5 - 10.5 mg/dL    AST(SGOT) 75 (H) 12 - 45 U/L    ALT(SGPT) 165 (H) 2 - 50 U/L    Alkaline Phosphatase 180 (H) 30 - 99 U/L    Total Bilirubin 0.4 0.1 - 1.5 mg/dL    Albumin 3.4 3.2 - 4.9 g/dL    Total Protein 7.1 6.0 - 8.2 g/dL    Globulin 3.7 (H) 1.9 - 3.5 g/dL    A-G Ratio 0.9 g/dL   URIC ACID    Collection Time: 01/09/23  5:00 PM   Result Value Ref Range    Uric Acid 5.1 1.9 - 8.2 mg/dL    PROTEIN/CREAT RATIO URINE    Collection Time: 01/09/23  5:00 PM   Result Value Ref Range    Total Protein, Urine 18.0 (H) 0.0 - 15.0 mg/dL    Creatinine, Random Urine 90.58 mg/dL    Protein Creatinine Ratio 199 (H) 10 - 107 mg/g   HOLD BLOOD BANK SPECIMEN (NOT TESTED)    Collection Time: 01/09/23  5:00 PM   Result Value Ref Range    Holding Tube - Bb DONE    T.PALLIDUM AB EMMANUELLE (SCREENING)    Collection Time: 01/09/23  5:00 PM   Result Value Ref Range    Syphilis, Treponemal Qual Non-Reactive Non-Reactive   CORRECTED CALCIUM    Collection Time: 01/09/23  5:00 PM   Result Value Ref Range    Correct Calcium 10.2 8.5 - 10.5 mg/dL   ESTIMATED GFR    Collection Time: 01/09/23  5:00 PM   Result Value Ref Range    GFR (CKD-EPI) 126 >60 mL/min/1.73 m 2   CBC WITH DIFFERENTIAL    Collection Time: 01/09/23  5:00 PM   Result Value Ref Range    WBC 11.0 (H) 4.8 - 10.8 K/uL    RBC 4.59 4.20 - 5.40 M/uL    Hemoglobin 11.9 (L) 12.0 - 16.0 g/dL    Hematocrit 37.3 37.0 - 47.0 %    MCV 81.3 (L) 81.4 - 97.8 fL    MCH 25.9 (L) 27.0 - 33.0 pg    MCHC 31.9 (L) 33.6 - 35.0 g/dL    RDW 48.0 35.9 - 50.0 fL    Platelet Count 311 164 - 446 K/uL    MPV 11.7 9.0 - 12.9 fL    Neutrophils-Polys 74.10 (H) 44.00 - 72.00 %    Lymphocytes 18.70 (L) 22.00 - 41.00 %    Monocytes 5.90 0.00 - 13.40 %    Eosinophils 0.30 0.00 - 6.90 %    Basophils 0.20 0.00 - 1.80 %    Immature Granulocytes 0.80 0.00 - 0.90 %    Nucleated RBC 0.00 /100 WBC    Neutrophils (Absolute) 8.11 (H) 2.00 - 7.15 K/uL    Lymphs (Absolute) 2.04 1.00 - 4.80 K/uL    Monos (Absolute) 0.64 0.00 - 0.85 K/uL    Eos (Absolute) 0.03 0.00 - 0.51 K/uL    Baso (Absolute) 0.02 0.00 - 0.12 K/uL    Immature Granulocytes (abs) 0.09 0.00 - 0.11 K/uL    NRBC (Absolute) 0.00 K/uL   POCT glucose device results    Collection Time: 01/09/23  8:50 PM   Result Value Ref Range    POC Glucose, Blood 93 65 - 99 mg/dL   POCT glucose device results    Collection Time: 01/09/23 11:36 PM   Result Value Ref Range     POC Glucose, Blood 91 65 - 99 mg/dL   POCT glucose device results    Collection Time: 01/10/23  5:06 AM   Result Value Ref Range    POC Glucose, Blood 90 65 - 99 mg/dL   POCT glucose device results    Collection Time: 01/10/23  7:56 AM   Result Value Ref Range    POC Glucose, Blood 87 65 - 99 mg/dL   POCT glucose device results    Collection Time: 01/10/23  1:43 PM   Result Value Ref Range    POC Glucose, Blood 72 65 - 99 mg/dL         Assessment: plan  36 weeks with pre-eclampsia with severe features - moving toward delivery. Bp is normal. Elevated LFT's and abdominal pain/ruq.  Polyhydramnios  GDM and maternal obesity  GBS negative  Fetal tracing reassuring  Will continue pitocin augmentation.   Will give her some Atarax for her anxiety    Annmarie Yip M.D.

## 2023-01-11 NOTE — PROGRESS NOTES
"OB Progress Note    Feeling tired. No HA or visual disturbances. No abdominal pain. LE swelling present. Working on breast feeding    /62   Pulse 92   Temp 36.8 °C (98.2 °F) (Temporal)   Resp 20   Ht 1.676 m (5' 6\")   Wt 113 kg (250 lb)   LMP 2022   SpO2 96%   Breastfeeding Unknown   BMI 40.35 kg/m²     General: NAD, sitting up in bed comfortably  Abdomen: soft, non tender  Extremities: mild LE swelling    Recent Labs     23  1700 23  0635   WBC 11.0*  11.0* 21.4*   RBC 4.59  4.59 3.93*   HEMOGLOBIN 11.9*  11.9* 10.2*   HEMATOCRIT 37.3  37.3 32.4*   MCV 81.3*  81.3* 82.4   MCH 25.9*  25.9* 26.0*   RDW 48.0  48.0 50.6*   PLATELETCT 311  311 304   MPV 11.7  11.7 11.8   NEUTSPOLYS 74.10*  --    LYMPHOCYTES 18.70*  --    MONOCYTES 5.90  --    EOSINOPHILS 0.30  --    BASOPHILS 0.20  --      Creatinine:0.8  AST:80  ALT:163    Assessment:  PPD#0 s/p   Preeclampsia with severe features    Plan:  Discussed risks and benefits of magnesium therapy. Will start now for 24 hours   Questions answered    Roger Toro M.D.    "

## 2023-01-11 NOTE — ANESTHESIA TIME REPORT
Anesthesia Start and Stop Event Times     Date Time Event    1/10/2023 0120 Ready for Procedure     0124 Anesthesia Start    1/11/2023 0459 Anesthesia Stop        Responsible Staff  01/10/23 to 01/11/23    Name Role Begin End    Daljit Hawkins M.D. Anesth 0124 0702    Min TORREY Cleary M.D. Anesth 0702 0459        Overtime Reason:  no overtime (within assigned shift)    Comments:

## 2023-01-11 NOTE — PROGRESS NOTES
PT with epidural but still having to push her button. Devine has been out and she is on pitocin  Vss afebrile  SVE: 4-5 cm/ difficult exam due to how uncomfortable pt is /screaming with exam  FHT: category 1, moderate variability, no decels; contractions are hard to trace.  Ext; 1+ edema maribel LE    A/p  Stopped exam as was too uncomfortable with her.  Notified anesthesia - will see if he can make her more comfortable as with her Poly I have to be able to leave my hand in place to safely AROM her and make sure head stays applied.

## 2023-01-12 LAB
MAGNESIUM SERPL-MCNC: 5.5 MG/DL (ref 1.5–2.5)
MAGNESIUM SERPL-MCNC: 5.8 MG/DL (ref 1.5–2.5)

## 2023-01-12 PROCEDURE — 700102 HCHG RX REV CODE 250 W/ 637 OVERRIDE(OP): Performed by: OBSTETRICS & GYNECOLOGY

## 2023-01-12 PROCEDURE — A9270 NON-COVERED ITEM OR SERVICE: HCPCS | Performed by: OBSTETRICS & GYNECOLOGY

## 2023-01-12 PROCEDURE — 700105 HCHG RX REV CODE 258: Performed by: OBSTETRICS & GYNECOLOGY

## 2023-01-12 PROCEDURE — 36415 COLL VENOUS BLD VENIPUNCTURE: CPT

## 2023-01-12 PROCEDURE — 770002 HCHG ROOM/CARE - OB PRIVATE (112)

## 2023-01-12 PROCEDURE — 700111 HCHG RX REV CODE 636 W/ 250 OVERRIDE (IP): Performed by: OBSTETRICS & GYNECOLOGY

## 2023-01-12 PROCEDURE — 83735 ASSAY OF MAGNESIUM: CPT | Mod: 91

## 2023-01-12 RX ORDER — ZOLMITRIPTAN 2.5 MG/1
2.5 TABLET, ORALLY DISINTEGRATING ORAL EVERY 6 HOURS PRN
Status: DISCONTINUED | OUTPATIENT
Start: 2023-01-12 | End: 2023-01-12

## 2023-01-12 RX ORDER — SUMATRIPTAN 25 MG/1
25 TABLET, FILM COATED ORAL
Status: DISCONTINUED | OUTPATIENT
Start: 2023-01-12 | End: 2023-01-13 | Stop reason: HOSPADM

## 2023-01-12 RX ADMIN — IBUPROFEN 800 MG: 800 TABLET, FILM COATED ORAL at 16:21

## 2023-01-12 RX ADMIN — SUMATRIPTAN SUCCINATE 25 MG: 25 TABLET ORAL at 21:44

## 2023-01-12 RX ADMIN — MAGNESIUM SULFATE HEPTAHYDRATE 2 G/HR: 40 INJECTION, SOLUTION INTRAVENOUS at 07:24

## 2023-01-12 RX ADMIN — IBUPROFEN 800 MG: 800 TABLET, FILM COATED ORAL at 01:32

## 2023-01-12 RX ADMIN — SODIUM CHLORIDE, POTASSIUM CHLORIDE, SODIUM LACTATE AND CALCIUM CHLORIDE: 600; 310; 30; 20 INJECTION, SOLUTION INTRAVENOUS at 01:05

## 2023-01-12 RX ADMIN — PRENATAL WITH FERROUS FUM AND FOLIC ACID 1 TABLET: 3080; 920; 120; 400; 22; 1.84; 3; 20; 10; 1; 12; 200; 27; 25; 2 TABLET ORAL at 07:22

## 2023-01-12 ASSESSMENT — PAIN DESCRIPTION - PAIN TYPE
TYPE: ACUTE PAIN

## 2023-01-12 NOTE — CARE PLAN
The patient is Stable - Low risk of patient condition declining or worsening    Shift Goals  Clinical Goals: Remain clinically stable & stable BP's    Progress made toward(s) clinical / shift goals:  Pt verbalizes when in pain and in need of pain medication or additional comfort measures to reach comfort goal. Pt fundus is firm and palpable, lochia scant-light, and perineum intact. Pt tolerating ambulation and voiding w/o difficulty. Pt actively involved in treatment plan of self and infant. Pt on Mg2+ and BP's and VS have been stable this shift.     Patient is not progressing towards the following goals: N/A    Problem: Pain - Standard  Goal: Alleviation of pain or a reduction in pain to the patient’s comfort goal  1/12/2023 0503 by Kisha Nixon R.N.  Outcome: Progressing  1/12/2023 0450 by Kisha Nixon R.N.  Outcome: Progressing     Problem: Knowledge Deficit - Postpartum  Goal: Patient will verbalize and demonstrate understanding of self and infant care  1/12/2023 0503 by Kisha Nixon R.N.  Outcome: Progressing  1/12/2023 0450 by Kisha Nixon R.N.  Outcome: Progressing     Problem: Psychosocial - Postpartum  Goal: Patient will verbalize and demonstrate effective bonding and parenting behavior  Outcome: Progressing     Problem: Altered Physiologic Condition  Goal: Patient physiologically stable as evidenced by normal lochia, palpable uterine involution and vitals within normal limits  1/12/2023 0503 by JULIETA Pena.N.  Outcome: Progressing  1/12/2023 0450 by MU PenaN.  Outcome: Progressing

## 2023-01-12 NOTE — CARE PLAN
The patient is Watcher - Medium risk of patient condition declining or worsening    Shift Goals  Clinical Goals: Patient will maintain stable VS; Lochia WDL; Pain WDL  Patient Goals: healthy mom  Family Goals: healthy baby    Progress made toward(s) clinical / shift goals:    Problem: Knowledge Deficit - L&D  Goal: Patient and family/caregivers will demonstrate understanding of plan of care, disease process/condition, diagnostic tests and medications  Outcome: Met     Problem: Psychosocial - L&D  Goal: Patient's level of anxiety will decrease  Outcome: Met  Goal: Patient will be able to discuss coping skills during hospitalization  Outcome: Met  Goal: Patient's ability to re-evaluate and adapt role responsibilities will improve  Outcome: Met  Goal: Spiritual and cultural needs incorporated into hospitalization  Outcome: Met     Problem: Risk for Venous Thromboembolism (VTE)  Goal: VTE prevention measures will be implemented and patient will remain free from VTE  Outcome: Met     Problem: Risk for Infection and Impaired Wound Healing  Goal: Patient will remain free from infection  Outcome: Met  Goal: Patient's wound/surgical incision will decrease in size and heals properly  Outcome: Met     Problem: Risk for Fluid Imbalance  Goal: Patient's fluid volume balance will be maintained or improve  Outcome: Met     Problem: Risk for Injury  Goal: Patient and fetus will be free of preventable injury/complications  Outcome: Met     Problem: Pain  Goal: Patient's pain will be alleviated or reduced to the patient’s comfort goal  Outcome: Met     Problem: Discharge Barriers/Planning  Goal: Patient's continuum of care needs are met  Outcome: Met       Patient is not progressing towards the following goals:

## 2023-01-12 NOTE — PROGRESS NOTES
0715 Assessment completed. Lochia light, fundus firm. Plan of care reviewed. Declines pain intervention at this time, will call if pain intervention needed.    1330 Magnesium therapy complete, Infusion ended and IV pole removed from room. Patient wanted IV port removed as well. Discussed that IV port is usually kept until discharge incase of need to give emergency medication. Patient stated she understands risks and prefers to have another IV inserted if necessary, and this IV is cause her a great deal of discomfort. Patient's VS stable, has not had any  range BP in last 24 hrs, IV removed per patient's request. Patient expressed that she is exhausted and just wants sleep. With patient's permission, infant taken to NBN for car seat challenge and do not disturb sign place on door until infant's next feeding.

## 2023-01-12 NOTE — LACTATION NOTE
This note was copied from a baby's chart.  Initial Consult:      at 36+1weeks. Pregnancy c/o with PET, GDM.  Currently on MgSO4 infusion.  Delivery uncomplicated. No prior breastfeeding experience. Current weight loss at 3.22%.  Will begin 3 step plan today.      1) Breastfeed.  Approx 15-30min    2) Pace bottle feed DBM according to Supplemental Feeding Guidelines.    3) Pump with HG pump 80cpm for 2min decrease to 60cpm.  Suction between 20-40%. Total time =15min. Repeat every 3hrs.  Using 25mm flanges.     Upon entering room MOB teary eyed and desires sleep.  Taught how to paced bottle feed with DBM.  Infnat consumed approx 14ml within 10min.  Burped swaddled and return to [facility].    Will return to bedside to assist with latch later today, if MOB desires.    Follow up @ 1530 for scheduled feeding time.  MOB had just finished attempting a latch.  MOB states baby latched for approx 10-15min, but fell asleep.  Denies difficulty.  Declines assistance with latch. Reviewed 3 step plan, no questions at this time.

## 2023-01-12 NOTE — CARE PLAN
The patient is Watcher - Medium risk of patient condition declining or worsening  Patient started on Mag.     Shift Goals  Clinical Goals: Patient will maintain stable VS; Lochia WDL; Pain WDL  Patient Goals: healthy mom  Family Goals: healthy baby    Progress made toward(s) clinical / shift goals:    Problem: Risk for Excess Fluid Volume  Goal: Patient will demonstrate pulse, blood pressure and neurologic signs within expected ranges and without any respiratory complications  Outcome: Progressing     Problem: Pain - Standard  Goal: Alleviation of pain or a reduction in pain to the patient’s comfort goal  Outcome: Progressing     Problem: Knowledge Deficit - Standard  Goal: Patient and family/care givers will demonstrate understanding of plan of care, disease process/condition, diagnostic tests and medications  Outcome: Progressing     Problem: Knowledge Deficit - Postpartum  Goal: Patient will verbalize and demonstrate understanding of self and infant care  Outcome: Progressing     Problem: Psychosocial - Postpartum  Goal: Patient will verbalize and demonstrate effective bonding and parenting behavior  Outcome: Progressing     Problem: Altered Physiologic Condition  Goal: Patient physiologically stable as evidenced by normal lochia, palpable uterine involution and vitals within normal limits  Outcome: Progressing     Problem: Infection - Postpartum  Goal: Postpartum patient will be free of signs and symptoms of infection  Outcome: Progressing

## 2023-01-12 NOTE — PROGRESS NOTES
"OB Progress Note    Doing well this morning.  Denies headache, visual disturbance, upper abdominal pain.  Swelling in lower extremities but it is not bothering her.  Ambulating without difficulty.  Normal lochia.  Tolerating p.o.  Voiding without difficulty.  Working on breast-feeding.    /65   Pulse 82   Temp 36.3 °C (97.3 °F) (Temporal)   Resp 16   Ht 1.676 m (5' 6\")   Wt 113 kg (250 lb)   LMP 2022   SpO2 95%   Breastfeeding Yes   BMI 40.35 kg/m²       General: NAD, sitting up in bed comfortably  Abdomen: soft, non tender  Extremities: mild LE swelling    Recent Labs     23  1700 23  0635   WBC 11.0*  11.0* 21.4*   RBC 4.59  4.59 3.93*   HEMOGLOBIN 11.9*  11.9* 10.2*   HEMATOCRIT 37.3  37.3 32.4*   MCV 81.3*  81.3* 82.4   MCH 25.9*  25.9* 26.0*   RDW 48.0  48.0 50.6*   PLATELETCT 311  311 304   MPV 11.7  11.7 11.8   NEUTSPOLYS 74.10*  --    LYMPHOCYTES 18.70*  --    MONOCYTES 5.90  --    EOSINOPHILS 0.30  --    BASOPHILS 0.20  --        Creatinine:0.8  AST:80--->54  ALT:163--->123    Assessment:  PPD#1 s/p   Preeclampsia with severe features  Elevated liver enzymes now improving!    Plan:  Discontinue magnesium therapy at 2 PM today  Ambulation encouraged  Questions answered    Roger Toro M.D.    "

## 2023-01-13 ENCOUNTER — PHARMACY VISIT (OUTPATIENT)
Dept: PHARMACY | Facility: MEDICAL CENTER | Age: 28
End: 2023-01-13
Payer: COMMERCIAL

## 2023-01-13 VITALS
BODY MASS INDEX: 40.18 KG/M2 | RESPIRATION RATE: 16 BRPM | HEIGHT: 66 IN | SYSTOLIC BLOOD PRESSURE: 135 MMHG | OXYGEN SATURATION: 98 % | TEMPERATURE: 97.7 F | WEIGHT: 250 LBS | DIASTOLIC BLOOD PRESSURE: 74 MMHG | HEART RATE: 92 BPM

## 2023-01-13 PROCEDURE — RXMED WILLOW AMBULATORY MEDICATION CHARGE: Performed by: OBSTETRICS & GYNECOLOGY

## 2023-01-13 PROCEDURE — 700102 HCHG RX REV CODE 250 W/ 637 OVERRIDE(OP): Performed by: OBSTETRICS & GYNECOLOGY

## 2023-01-13 PROCEDURE — A9270 NON-COVERED ITEM OR SERVICE: HCPCS | Performed by: OBSTETRICS & GYNECOLOGY

## 2023-01-13 RX ORDER — IBUPROFEN 800 MG/1
800 TABLET ORAL EVERY 8 HOURS PRN
Qty: 30 TABLET | Refills: 0 | Status: SHIPPED | OUTPATIENT
Start: 2023-01-13

## 2023-01-13 RX ADMIN — IBUPROFEN 800 MG: 800 TABLET, FILM COATED ORAL at 08:08

## 2023-01-13 RX ADMIN — IBUPROFEN 800 MG: 800 TABLET, FILM COATED ORAL at 01:01

## 2023-01-13 RX ADMIN — PRENATAL WITH FERROUS FUM AND FOLIC ACID 1 TABLET: 3080; 920; 120; 400; 22; 1.84; 3; 20; 10; 1; 12; 200; 27; 25; 2 TABLET ORAL at 08:08

## 2023-01-13 ASSESSMENT — EDINBURGH POSTNATAL DEPRESSION SCALE (EPDS)
I HAVE FELT SAD OR MISERABLE: NO, NOT AT ALL
I HAVE LOOKED FORWARD WITH ENJOYMENT TO THINGS: RATHER LESS THAN I USED TO
THE THOUGHT OF HARMING MYSELF HAS OCCURRED TO ME: NEVER
I HAVE BEEN ABLE TO LAUGH AND SEE THE FUNNY SIDE OF THINGS: AS MUCH AS I ALWAYS COULD
I HAVE BEEN SO UNHAPPY THAT I HAVE BEEN CRYING: NO, NEVER
I HAVE FELT SCARED OR PANICKY FOR NO GOOD REASON: NO, NOT AT ALL
I HAVE BEEN ANXIOUS OR WORRIED FOR NO GOOD REASON: NO, NOT AT ALL
THINGS HAVE BEEN GETTING ON TOP OF ME: YES, SOMETIMES I HAVEN'T BEEN COPING AS WELL AS USUAL
I HAVE BEEN SO UNHAPPY THAT I HAVE HAD DIFFICULTY SLEEPING: NOT AT ALL
I HAVE BLAMED MYSELF UNNECESSARILY WHEN THINGS WENT WRONG: NOT VERY OFTEN

## 2023-01-13 ASSESSMENT — PAIN DESCRIPTION - PAIN TYPE
TYPE: ACUTE PAIN

## 2023-01-13 NOTE — PROGRESS NOTES
Discharge Summary:     Date of Admission: 2023  Date of Discharge: 23      Admitting diagnosis:    1. Pregnancy @ 35w6d  2. Pre-eclampsia with severe features  3. Gestational diabetes      Discharge Diagnosis:   1. Status post spontaneous vaginal delivery  2. Pre-eclampsia with severe features  PAST MEDICAL HISTORY:  Again includes irritable bowel syndrome, depression,   anxiety and migraine headaches.     PAST SURGICAL HISTORY:  Includes right knee surgery in .     FAMILY HISTORY:  Includes mother with blood clotting disorder, MTHFR,   hypertension, hypercholesterolemia and a half-brother with hemophilia.     SOCIAL HISTORY:  No alcohol, tobacco or history of drug use.     ALLERGIES:  SEPTRA, WHICH CAUSES A RASH.  PENICILLIN CAUSES A RASH AND   SHELLFISH.     GYNECOLOGIC HISTORY:  Last menstrual period 2022.  She has no history of   abnormal Paps or STDs including no history of herpes simplex virus.     Hospital Course:   Pt is a 27 y.o. now  who presented for induction of labor secondary to preE with SF secondary to increased LFTs and RUQ pain.  Her induction was started with a cervical balloon and pitocin.  She underwent spontaneosu vaginal delivery, giving birth to a viable male infant, APGARs 8/9, weight 3105g.  Postpartum she did receive mag sulfate x24hrs for seizure prophylaxis. Her blood pressures remained normal to mild range throughout her stay and postpartum she remained essentially normotensive with very rare mild ranges.  Her LFTs were trending down prior to discharge.  On PPD 2 pt felt ready for discharge.  On day of discharge pt was ambulating, voiding spontaneously, tolerating PO, with adequate pain control, and desiring to go home.    Physical Exam:  Temp:  [35.8 °C (96.5 °F)-36.8 °C (98.2 °F)] 36.6 °C (97.9 °F)  Pulse:  [72-97] 72  Resp:  [16-20] 18  BP: (124-144)/(61-79) 124/77  SpO2:  [95 %-99 %] 97 %  Gen: AAO, NAD  Abd: soft, NT, ND, fundus firm below  umiblicus  Ext: NT, 1+edema bilaterally    Current Facility-Administered Medications   Medication Dose    SUMAtriptan (IMITREX) tablet 25 mg  25 mg    lactated ringers infusion      ibuprofen (MOTRIN) tablet 800 mg  800 mg    calcium GLUConate injection 1 g  1 g    miSOPROStol (CYTOTEC) tablet 800 mcg  800 mcg    oxyCODONE immediate-release (ROXICODONE) tablet 5 mg  5 mg    prenatal plus vitamin (STUARTNATAL 1+1) 27-1 MG tablet 1 Tablet  1 Tablet    simethicone (Mylicon) chewable tablet 125 mg  125 mg    calcium carbonate (Tums) chewable tab 1,000 mg  1,000 mg    calcium GLUConate injection 1 g  1 g    calcium carbonate (Tums) chewable tab 1,000 mg  1,000 mg    hydrOXYzine HCl (ATARAX) tablet 50 mg  50 mg       Recent Labs     01/11/23  0635 01/11/23  1947   WBC 21.4* 17.1*   RBC 3.93* 3.44*   HEMOGLOBIN 10.2* 8.9*   HEMATOCRIT 32.4* 28.0*   MCV 82.4 81.4   MCH 26.0* 25.9*   MCHC 31.5* 31.8*   RDW 50.6* 49.6   PLATELETCT 304 293   MPV 11.8 11.5         Activity/ Discharge Instructions::   Discharge to home  Pelvic Rest x 6 weeks  No heavy lifting x4 weeks  Call or come to ED for: heavy vaginal bleeding, fever >100.4, severe abdominal pain, severe headache, chest pain, shortness of breath,  N/V, abnormal vaginal discharge, or other concerns.       Follow up:  <1k for BP check w/ Dr. Yip; 5-6wks for PP visit.     Discharge Meds:      Medication List        START taking these medications        Instructions   ibuprofen 800 MG Tabs  Commonly known as: MOTRIN   Take 1 Tablet by mouth every 8 hours as needed for Moderate Pain.  Dose: 800 mg            CONTINUE taking these medications        Instructions   docusate sodium 100 MG Caps  Commonly known as: COLACE   Take 100 mg by mouth 2 times a day.  Dose: 100 mg     famotidine 20 MG Tabs  Commonly known as: PEPCID   Take 20 mg by mouth 2 times a day.  Dose: 20 mg     ferrous gluconate 324 (38 Fe) MG Tabs  Commonly known as: FERGON   Take 324 mg by mouth every  evening.  Dose: 324 mg     PRENATAL 1 PO   Take  by mouth every evening.            STOP taking these medications      vitamin D2 (Ergocalciferol) 1.25 MG (75852 UT) Caps capsule  Commonly known as: Nathan Reed MD

## 2023-01-13 NOTE — DISCHARGE PLANNING
Meds-to-Beds: Discharge prescription order listed below delivered to patient's bedside. RN Melina notified. Patient counseled. Patient elected to have co-payment billed to patient account.      Current Outpatient Medications   Medication Sig Dispense Refill    ibuprofen (MOTRIN) 800 MG Tab Take 1 Tablet by mouth every 8 hours as needed for Moderate Pain. 30 Tablet 0      Leana Coronel, PharmD

## 2023-01-13 NOTE — PROGRESS NOTES
Assessment per flowsheet completed. Pt  denied H/A or dizziness. Reviewed POC for this evening; questions answered.

## 2023-01-13 NOTE — DISCHARGE PLANNING
Discharge Planning Assessment Post Partum    Reason for Referral: History of anxiety and depression  Address: [facility] [place] [address]  Phone: [phone]  Type of Living Situation: living with FOB  Mom Diagnosis: Pregnancy  Baby Diagnosis: [place]-36.1 weeks  Primary Language: English    Father of the Baby: Danie Riggins   Involved in baby’s care? Yes  Contact Information: [phone]    Prenatal Care: Yes  Mom's PCP: IRENE Weiner  PCP for new baby: [facility]    Support System: FOB and family  Coping/Bonding between mother & baby: Yes  Source of Feeding: breast feeding  Supplies for Infant: prepared for infant; denies any needs    Mom's Insurance: [employer]  Baby Covered on Insurance:Yes  Mother Employed/School: [employer]  Other children in the home/names & ages: first baby    Financial Hardship/Income: No   Mom's Mental status: alert and oriented  Services used prior to admit: None    CPS History: No  Psychiatric History: history of anxiety and depression.  MOB scored a 4 on the EPDS screen.  Discussed with parents and provided a list of counseling and support group resources specializing in maternal mental health  Domestic Violence History: No  Drug/ETOH History: No    Resources Provided: post partum support and counseling resources provided  Referrals Made: None     Clearance for Discharge: Infant is cleared to discharge home with parents once medically cleared

## 2023-01-13 NOTE — PROGRESS NOTES
Assessment completed, fundus firm, lochia light. POC reviewed, verbalized understanding. Rating pain 2/10. Prn medication given.

## 2023-01-13 NOTE — CARE PLAN
The patient is Stable - Low risk of patient condition declining or worsening    Shift Goals  Clinical Goals: Patient will maintain stable VS; Lochia WDL; Pain WDL  Patient Goals: healthy mom  Family Goals: healthy baby    Progress made toward(s) clinical / shift goals:    Problem: Risk for Excess Fluid Volume  Goal: Patient will demonstrate pulse, blood pressure and neurologic signs within expected ranges and without any respiratory complications  Outcome: Progressing     Problem: Pain - Standard  Goal: Alleviation of pain or a reduction in pain to the patient’s comfort goal  Outcome: Progressing     Problem: Knowledge Deficit - Standard  Goal: Patient and family/care givers will demonstrate understanding of plan of care, disease process/condition, diagnostic tests and medications  Outcome: Progressing     Problem: Knowledge Deficit - Postpartum  Goal: Patient will verbalize and demonstrate understanding of self and infant care  Outcome: Progressing     Problem: Psychosocial - Postpartum  Goal: Patient will verbalize and demonstrate effective bonding and parenting behavior  Outcome: Progressing     Problem: Altered Physiologic Condition  Goal: Patient physiologically stable as evidenced by normal lochia, palpable uterine involution and vitals within normal limits  Outcome: Progressing     Problem: Infection - Postpartum  Goal: Postpartum patient will be free of signs and symptoms of infection  Outcome: Progressing       Patient is not progressing towards the following goals:

## 2023-01-13 NOTE — DISCHARGE INSTRUCTIONS

## 2023-01-13 NOTE — CARE PLAN
The patient is Stable - Low risk of patient condition declining or worsening    Shift Goals  Clinical Goals: Adequate pain control  Patient Goals: rest  Family Goals: support and family bonding    Problem: Pain - Standard  Goal: Alleviation of pain or a reduction in pain to the patient’s comfort goal  Outcome: Progressing     Problem: Altered Physiologic Condition  Goal: Patient physiologically stable as evidenced by normal lochia, palpable uterine involution and vitals within normal limits  Outcome: Progressing     Progress made toward(s) clinical / shift goals: Pt reports adequate pain relief with PRN Motrin. Lochia remains scant without clots expressed, fundus firm and midline.      Patient is not progressing towards the following goals:

## 2023-01-17 NOTE — DISCHARGE SUMMARY
Discharge Summary:     Date of Admission: 2023  Date of Discharge: 23      Admitting diagnosis:    1. Pregnancy @ 35w6d  2. Pre-eclampsia with severe features  3. Gestational diabetes      Discharge Diagnosis:   1. Status post spontaneous vaginal delivery  2. Pre-eclampsia with severe features  PAST MEDICAL HISTORY:  Again includes irritable bowel syndrome, depression,   anxiety and migraine headaches.     PAST SURGICAL HISTORY:  Includes right knee surgery in .     FAMILY HISTORY:  Includes mother with blood clotting disorder, MTHFR,   hypertension, hypercholesterolemia and a half-brother with hemophilia.     SOCIAL HISTORY:  No alcohol, tobacco or history of drug use.     ALLERGIES:  SEPTRA, WHICH CAUSES A RASH.  PENICILLIN CAUSES A RASH AND   SHELLFISH.     GYNECOLOGIC HISTORY:  Last menstrual period 2022.  She has no history of   abnormal Paps or STDs including no history of herpes simplex virus.     Hospital Course:   Pt is a 27 y.o. now  who presented for induction of labor secondary to preE with SF secondary to increased LFTs and RUQ pain.  Her induction was started with a cervical balloon and pitocin.  She underwent spontaneosu vaginal delivery, giving birth to a viable male infant, APGARs 8/9, weight 3105g.  Postpartum she did receive mag sulfate x24hrs for seizure prophylaxis. Her blood pressures remained normal to mild range throughout her stay and postpartum she remained essentially normotensive with very rare mild ranges.  Her LFTs were trending down prior to discharge.  On PPD 2 pt felt ready for discharge.  On day of discharge pt was ambulating, voiding spontaneously, tolerating PO, with adequate pain control, and desiring to go home.    Physical Exam:     Gen: AAO, NAD  Abd: soft, NT, ND, fundus firm below umiblicus  Ext: NT, 1+edema bilaterally    No current facility-administered medications for this encounter.     Current Outpatient Medications   Medication    ibuprofen  (MOTRIN) 800 MG Tab    Prenatal MV-Min-Fe Fum-FA-DHA (PRENATAL 1 PO)    ferrous gluconate (FERGON) 324 (38 Fe) MG Tab    docusate sodium (COLACE) 100 MG Cap    famotidine (PEPCID) 20 MG Tab                 Activity/ Discharge Instructions::   Discharge to home  Pelvic Rest x 6 weeks  No heavy lifting x4 weeks  Call or come to ED for: heavy vaginal bleeding, fever >100.4, severe abdominal pain, severe headache, chest pain, shortness of breath,  N/V, abnormal vaginal discharge, or other concerns.       Follow up:  <1k for BP check w/ Dr. Yip; 5-6wks for PP visit.     Discharge Meds:      Medication List        START taking these medications        Instructions   ibuprofen 800 MG Tabs  Commonly known as: MOTRIN   Take 1 Tablet by mouth every 8 hours as needed for Moderate Pain.  Dose: 800 mg            CONTINUE taking these medications        Instructions   docusate sodium 100 MG Caps  Commonly known as: COLACE   Take 100 mg by mouth 2 times a day.  Dose: 100 mg     famotidine 20 MG Tabs  Commonly known as: PEPCID   Take 20 mg by mouth 2 times a day.  Dose: 20 mg     ferrous gluconate 324 (38 Fe) MG Tabs  Commonly known as: FERGON   Take 324 mg by mouth every evening.  Dose: 324 mg     PRENATAL 1 PO   Take  by mouth every evening.            STOP taking these medications      vitamin D2 (Ergocalciferol) 1.25 MG (73789 UT) Caps capsule  Commonly known as: Nathan Reed MD

## 2023-01-19 ENCOUNTER — OFFICE VISIT (OUTPATIENT)
Dept: OBGYN | Facility: CLINIC | Age: 28
End: 2023-01-19
Payer: COMMERCIAL

## 2023-01-19 DIAGNOSIS — Z91.89 AT RISK FOR POSTPARTUM DEPRESSION: ICD-10-CM

## 2023-01-19 DIAGNOSIS — O92.29 SORE NIPPLES DUE TO LACTATION: ICD-10-CM

## 2023-01-19 DIAGNOSIS — O92.79 LACTATION DISORDER, POSTPARTUM CONDITION: ICD-10-CM

## 2023-01-19 PROCEDURE — 99215 OFFICE O/P EST HI 40 MIN: CPT | Performed by: NURSE PRACTITIONER

## 2023-01-19 NOTE — PROGRESS NOTES
Summary: 36.1 week, started on LPI protocol, home triple feeding but not sustainable. Lansinoh pump single, making enough milk for the next feeding. Has milk frozen from the first days when baby as taking less. No formula. Breastfeeding limited as he falls asleep. Hospitalized for jaundice day 5, 24 hours.  Today: Infant not ready to breastfeed and triple feeding exhausting and not purposefully. Had pumped an hour ago.  Plan: Maximize supply with double pumping. Does not have platinum pieces from the hospital, may ask for Spectra loan from other friend/family. Pump 8x/day, may use some freezer milk to not feel stressed about making milk for the next feeding. Offer breast for skin to skin or after a bottle and he wants to suck, will practice breastfeeding as his jaundice levels fall and his gestational age increases. Feeding him well, keep up bottles at about 18oz per day for a 7# baby, 20oz for an 8# baby. These are minimums, volume per feed will vary.   Follow up:   Lactation appointment: Group Encouraged and 2 weeks as baby is more interested in being at the breast and capable of removing milk more effectively  Baby 's Provider appointment: 14 Day Well Child Check   Referrals: Maternal Mental Health Discussed, mom will initiate as needed    Subjective:     Kelvin Riggins is a 27 y.o. female here for lactation care. She is here today with baby    Concerns:   Maternal: latch on difficulties , nipple pain , and feeling that there is not enough milk   Infant: sleepy baby and baby not interested    HPI:   Pertinent  history: , preelampsia with seere features, supported with MgSO4    Mother does not have a history of advanced maternal age, hypertension prior to pregnancy, insulin resistance, multiple gestation, PCOS, and thyroid disease. Common condition(s) which may interfere with milk supply.    Mother does have GDM, GHTN, and auto immune disease (IBS). Common condition(s) that may interfere in milk  supply.    Breast changes in pregnancy: Yes, leaking in pregnancy  History of breast surgeries: No    FEEDING HISTORY:    Previous Breastfeeding History: First baby.   Hospital Course: 36.1 week, started on LPI protocol, home triple feeding  Currently 1/19/2023: Home triple feeding but not sustainable. Lansinoh pump single, making enough milk for the next feeding. Has milk frozen from the first days when baby as taking less. No formula. Breastfeeding limited as he falls asleep     Both breasts: No     Supplement: Supplementation initiated inpatient and Expressed breast milk  Quantity: 2oz now, had been providing smaller amounts until admitted for jaundice  How given/devices:  Bottle  Bottle/nipple type: Slow flow [facility] nipple    Nipple Shield Use: None    Breast Pumping:   Frequency every 3 hours , Quantity obtained enough for the next feeding about 2oz, today 30ml extra, Type of pump Lansinoh, and Flange size/type 24mm  NO pain with pumping    Maternal ROS:  Constitutional: No fever, chills. Feeling well  Breasts: No soreness of breasts and Soreness of nipples  Psychiatric: Feels exhausted and Managing ok  Mental Health: No mention of feeling irritable, agitated, angry, overwhelmed, apathetic, appetite changes, exhausted nor having sleep changes outside infant feeds/demands.  Current Outpatient Medications on File Prior to Visit   Medication Sig Dispense Refill    ibuprofen (MOTRIN) 800 MG Tab Take 1 Tablet by mouth every 8 hours as needed for Moderate Pain. 30 Tablet 0    Prenatal MV-Min-Fe Fum-FA-DHA (PRENATAL 1 PO) Take  by mouth every evening.      ferrous gluconate (FERGON) 324 (38 Fe) MG Tab Take 324 mg by mouth every evening.      docusate sodium (COLACE) 100 MG Cap Take 100 mg by mouth 2 times a day.      famotidine (PEPCID) 20 MG Tab Take 20 mg by mouth 2 times a day.       No current facility-administered medications on file prior to visit.      Past Medical History:   Diagnosis Date    Allergy      Anxiety     ASTHMA     child -cifuentes.  controlled without meds since 6 yrs old    Clotting disorder (HCC)     patient unsure of the specifics (MTHFR) - heterozygous carrier     Depression     IBS (irritable bowel syndrome)     Migraine          Objective:     Maternal Physical Lactation Exam  General: No acute distress  Breasts: Symmetrical , Soft, Plugged Duct - no evidence, and Mastitis  - no S/S  Psychiatric: Normal mood and affect. Her behavior is normal. Judgment and thought content normal.  Mental Health: Did NOT exhibit sadness, crying, feeling overwhelmed, agitation or hypervigilance.    Assessment/Plan & Lactation Counseling:     Infant Weight History:   23  6#13.5oz  23  6#15.3oz hospital  23  6#10oz ped office before hospital admission.  23 6#13.2oz naked at peds, jaundice follow up visit.    Infant Intake at Breast:Not breastfeeding at this time.      Pumped: Not indicated had pumped an hour earlier  Type of Pump:  Lansinoh       Nipple Pain from: Constant rubbing, using some nipple butter or Aquaphor   Milk Supply Available: normal    Maternal Diagnosis/Problem:  Lactation Disorder- Baby not latching   Sore nipples  At risk for PPD    MATERNAL PERSONALIZED BREASTFEEDING PLAN  Discussed concerns and symptoms as listed above in assessment and guidance summarized below. Shared decision making was used between myself and the family for this encounter, home care, and follow up. Topics reviewed included:   4th Trimester: The 12-week period immediately after mom has had the baby. Not everyone has heard of it, but every mother and their  baby will go through it. It is a time of great physical and emotional change as the baby adjusts to being outside the womb, and the family adjusts to new life as parents  During the fourth trimester, one can expect fussiness and crying from the baby and very likely exhaustion for the family. [place] babies are learning to adjust to life outside the  "womb where it was warm and squishy!  There is a lot of misinformation about babies and their needs, and parents are often encouraged to ignore baby's signals. Bad idea. Babies are “half-baked” at birth and have much to learn with the help of physical and emotional support from caregivers. Taking care of a baby's needs is an investment that pays off with a happier, healthier child and adult.  It can take weeks or even months for your body to feel totally normal again. There is a major hormonal upheaval experienced by moms in the first few weeks after birth, because their bodies are shifting from many pregnancy hormones to a more normal hormonal make-up.  These first three months with baby earthside is a delicate time. Honor it with a mindful dose of support. Mindful Mamma's is an mayte that may help.   Self-Compassion through Relational Support and Interaction.   Be kind to ourself. This is the first step in reducing anxiety and depression. Pay attention to how you are doing.   What do you need? Food, Rest, Sleep, Support, to Laugh/giggle: who will you ask today? They want to help you. We want to help you.   How do you stop your self-blame, or your self-criticism?   Get out of the house each day, walk or drive somewhere or ask a friend to drive you,  a \"treat\" at a drive through.   Mood and Anxiety Disorders: Discussed having a new baby can be joyful time for some new moms, but a difficult time for others. For all, it is a time of profound physical and emotional change. Balancing baby, family, partners and friends, work, pets, and preexisting or new life stressors as well as sleep deprivation can be extremely challenging. Untreated depression, sleep exhaustion, and anxiety are each a challenge that must be addressed and in addition can contribute to early cessation of breastfeeding. It is important both for the mental health and physical health of new moms and babies to get on the path to feeling better " and if therapeutic support is needed, specific resources are available, please ask.   Triple Feeding and its sustainability and its impact on the mother baby relationship, mom most aware  Sleep or Lack of: Human Giver Syndrome (moms) tells us it’s “self-indulgent” to rest and sleep, which makes as much sense as believing it’s weak or selfindulgent to breathe. We discussed strategies to manage restorative sleep, although short amounts, significant to the mental health of the mother. The principle follows:  Mom goes to sleep right after 8pm +/- feeding  Partner covers first late evening feeding (10pm/11pm), settles baby,  then goes to bed  Mom covers next feeding 1am /2am, partner sleeps  Next feeding share  LPIs (Late  Infants 36-37 Weeks)The early baby born before 37 weeks of gestation is very prone to be sleepy at the breast, especially in the first several weeks. The most common reason for lack of milk transfer in newborns is simply falling asleep at the breast. It can sometimes be very difficult to determine whether sleepiness is due to insufficient transfer from a tongue tie/oromotor issues, or whether they are really primarily sleepy. If a baby needs to be woken frequently to feed during the day and/or falls asleep bottle feeding, they are NOT ready for prime time at the breast, so please don't expect them to transfer sufficiently at the breast.  Infants who are sleepy can take 3 and, very occasionally, 4-6 weeks after their due date to be strong enough to transfer at every feeding. One way to know if this is the issue for this baby is if he has 1-2 feeds during the day when he is great at the breast, transfers sufficient volume, and does not need a bottle. This would typically occur after a good nap, or first feed in the am. I would encourage you to feed the baby less often at the breast, so mom can decrease the frequency of triple feeds. Pump and bottle feed several of the feeds. Once the baby  demonstrates success with a few feeds at the breast, then gradually increase the # of feeds at the breast. Triple feeding should never last longer than a week, double feeding yes, not triple 24/7. Sustainability is critical.  Milk Supply is dependent on glandular tissue development, hormonal influences, how many times the baby removes milk and how well the breasts are emptied in a 24 hour period. This is a biological reality that we can NOT work around. If, for any reason, your baby is not latching, or you are not able to nurse, then it is important for you to remove the milk instead by pumping or hand expression.  There's no magic trick, tea, food, drink, cookie or supplement that will increase your milk supply. One  must  effectively remove milk to continue to make and maximize milk. In the early days and weeks that can be 8+ times in 24 hours. For older babies, on average 6-7 + times in 24 hours.    Hydration: Staying hydrated is important however lack of hydration is usually not a cause of significantly low milk production.  Everyone needs a different amount of water, depending on their activity and diet. A high salt and/or high-protein diet, high physical activity, or very warm weather/sweating will require more fluids. A person eating a diet high in veggies and fruit, with a lack of physical activity will require fewer fluids. There is no magic number for the # of ounces of water each day.The best way to know that you are well hydrated is by looking at your urine.  Urine should look clear to light pale yellow, and you should need to pee at least every 3 to 4 hours unless you have a large bladder capacity.  Hypertensive Disease in PG (HDP) (preeclampsia, Gestational hypertension and eclampsia)is associated delayed lactogenesis ll and reduced  milk production:   delayed initiation of breastfeeding (e.g., mother ill,  difficult birth, mother?infant separation) ? decreased  breast stimulation   Postpartum edema  (3rd spacing of fluid) ? lower   intravascular volume ? poor diffusion of fluid into  mammary alveoli ? delayed onset of copious milk  production    postpartum breast edema may also contribute to difficulties  with latch; sore nipples   role of underlying metabolic condition    MgSO4 may impact milk supply  (Eulalio et al, 2018):   Herbs and Medications  A galactagogue is an herb or medication taken by a breastfeeding mother to increase her milk supply. We know that for centuries mothers around the world have sought out remedies to increase their milk supply. However, there is limited research on the safety and effectiveness of herbal galactagogues, which makes it hard for us to endorse them. It is not known if any of these herbs are truly effective, and it is difficult to predict how a specific herbal galactagogue will affect an individual, requiring “trial and error” in many situations. When effective, results are generally seen within 24-72 hours of starting an herbal galactagogue. Many of these herbs are used to decrease high blood sugar. If you are diabetic or have problems with low blood sugar, or thyroid disease you may not be a candidate for herbs. Not all women can increase their low milk supply with a galactagogue due to the many underlying causes of low milk production.  When taking a galactagogue, remember that frequent milk removal is still the most effective way to increase supply.   Feeding:   Infant feeding well given current interval growth, guidelines to follow:  Feed your baby every 1.5-3 hours, more often if baby acts hungry.   Awaken baby for feeding if going over 3 hours in the day.   ONE four hour at night is acceptable if has had 8 prior feedings in 24 hours.    Daily goal: 8-12 feedings per 24 hours.   Supplement:   Supplement with expressed milk  Supplement with formula if needed   Proper powder formula preparation:  https://www.cdc.gov/nutrition/downloads/prepare-store-powered-fszkzi-eijdxsx-167.pdf.   For babies under 2 months: https://www.cdc.gov/cronobacter/infection-and-infants.html  Pacing the feeding:  A slow flow nipple helps, but how you feed the baby is more important.  Good positioning can compensate for a faster flow nipple.  When bottle-feeding, the baby should control how much is consumed at a feeding.  Holding the baby in an upright position with the bottle horizontal ensures that the baby gets milk only when sucking.  Here is a nice video demonstrating this concept of paced bottle feeding,  https://www.[employer].com/watch?v=HmZJE5pFL1P  Pacifier Use:  The American Academy of Pediatrics' Position Paper reports: Although we recommend a conservative approach regarding pacifier use, we do not endorse a complete ban on the use of pacifiers, nor do we support an approach that induces parental guilt concerning their choices about the use of pacifiers. Pacifier use and breastfeeding in term and  newborns- a systematic review and meta-analysis from the  J of Pediatrics Published online 2022. Has found that when pacifiers are used among individuals who have been counseled on the risks, do not interfere with breastfeeding exclusivity or duration. These are parental choices.    Positioning and Latch to be discussed when baby ready to breastfeed   DoubleFeeding: A short term solution: Bottle/pump: Consider mostly double pumping as baby prepares for more robust feeding sessions  If triple feeing to practice breastfeeding in the future days, FIRST decide what you can do at that feeding and if you decide to double feed -pump and bottle, that is sufficient.  If you are going to offer the breast at that feeding:  nurse first and limit time on the breasts to 20+/- min total  finish with bottle  pump afterwards to finish emptying your breasts which will help increase milk supply  As baby becomes more effective,  evidenced by not pumping much milk after a breastfeeding, we will create a plan to decrease pumping.  Use your own pumped milk, donor human milk, or formula.    Pumping Guidelines:  Both breasts   Sustaining a healthy baseline prolactin level is vital- this means feeding/pumping at least every 3 hours with no more than a 5 hour break at night.   Pumping is effective but can quickly exhaust and overwhelm a new mother  Pump 8 times in 24 hours if tolerated, if you need to pump less, you will make less milk but your mental health is more important  Bra    Consider a hands free bra - [employer]ishes is recommended.  Type of Pump:  Lansinoh  Ameda Platinum Settings if you rent  http://www.[employer]/healthcare-professionals/videos/ameda-platinum-with-hygienikit-video  Speed: Start at 80 then turn down to 60 after 1.5-2 minutes when you see milk in the flange channel  Suction: To comfort, goal of  31%. NEVER to discomfort.   Spectra Settings if you use a Spectra  Press letdown button when first starting         Cycle: 70 / Vacuum: L1 - L 5 (To comfort)  Once milk lets down, press letdown button again  Cycle: 54 / Vacuum: L1 - L12 (To comfort)  To check the hours on the Spectra  https://SiteJabber.Gemvara.com/how-to-check-the-hours-on-your-spectra/  Always double pump  How long will vary woman to woman, typically 8-15 minutes, or 1-2 minutes after flow slows  Flange Fit: Freely moving nipple in the tunnel with some movement of the areola.    Caution with Breast Massage The word “Massage” means different things in the breastfeeding world. It is described and interpreted in so many different ways and unfortunately potentially harmful. The hands can be safe on a breast and  gentle to help in many ways but they also can be damaging when used in the wrong way.  Lymphatic drainage massage is appropriate,  open hand , lightly stroking only, and can be highly effective. The massage advice to knead , massage deeply , vibrate with  marketed tools is  most concerning. Be gentle with this gland to not increase inflammation.     Increasing supply besides Galactogogues and Pumping:  Warmth  Relaxation   Physical, auditory narratives  Childbirth relaxation Techniques  Acupuncture and acupressure  [facility] https://www.GAMEVIL/   Mino Garcia [email or web address]    Nipple care:    May apply breastmilk  Moist-oily ointment after feeding/pumping, ie Lanolin nipple butter, coconut or olive oil, if desired/needed 2-3 times/day until nipples are healed  You do not need to wash this off before pumping or feeding the baby  Hydrogels recommended  Hydrogels Soothies when you need to provide moisture, coconut oil is not thick enough    Answers to FAQs: https://www.infantrisk.com/category/breastfeeding  Alcohol & Breastfeeding: What's your time-to-zero?  Cough & Cold Medications while Breastfeeding  Vitamin D Supplementation and Breastfeeding  Antidepressant Use While Breastfeeding: What should I know?  Breastfeeding, Caffeine, and Energy Drinks    Connect with other mothers:  Breastfeeding [place] [facility]  WEIGHT CHECKS  Tuesdays 10am - 11am. Women's Health at [address] [place] [address], [facility] [address] [place] [address], 3rd floor conference room  Check your baby's weight, do a feeding and see how your baby is growing, visit with other mothers, plan on a walk or coffee date after group.  Please download Growth: Baby and Child for Apple or Child Growth Tracker for [employer] if you would like to  document and follow your baby's growth curve.  Please wear a mask coming and going: you may remove it in the classroom  Due to space limitations - limit strollers please (New c/section moms please use your stroller).  We would love to have dads stay, but moms won't breastfeed if there are men in the room, sorry.  The room is generally scheduled for another event following group.  Please take all diapers with you   ONLINE SUPPORT GROUPS  [employer]  (PSI) support groups are conducted using a aztx-ms-hgcq support model and are not intended for those experiencing a mental health crisis. Groups are 90 minutes (1.5 hours) in length. The first ~30 minutes is providing information, education, and establishing group guidelines. The next ~60 minutes is “talk time,” in which group members share and talk with each other. Group members must be present for the group guidelines before joining in the discussion or “talk time.”     Position, Latch and Pumping discussed and plan provided. (Documented on moms chart).     Infant Exam Summary:    Healthy 8 day old.  Anticipatory guidance was provided regarding feedings.   Weight  good interval growth:  Created a plan to meet family's breastfeeding goals with pumping and holding off on breastfeeding  Patient will learn to breastfeed as his jaundice decreases and gestational age increases and offering it 1x/day to evaluate is appropriate  Patient with mild jaundice on chest and face, peds following.     Contact Breastfeeding Medicine    or your Pediatrician for any of the following:   Decreased wet or poopy diapers  Decreased feeding  Baby not waking up for feeds on own most of time.   Irritability  Lethargy  Dry sticky mouth.   Any breastfeeding questions or concerns.    In Conclusion:   Managing breastfeeding and milk supply is very dynamic,can be a complex and intimate journey, and is not one size fits all. When obstacles present themselves, it takes confidence, persistence and support. The rights of the child include optimal nutrition and mothers need help to make informed decisions. You  and your baby have been screened for biological, psychological, and social risk factors that might interfere with achieving your goals.  Support is critical. You are now the focus of our Breastfeeding Medicine team; we are here to support your decisions and vision.     Follow up requires close monitoring in this time sensitive window of  opportunity to establish milk supply and facilitate the learning of  breastfeeding as Tc gets older in the next days/weeks    Follow-up for infant weight check and dyad breastfeeding evaluation:  2 weeks and group encouraged.    Please call [phone] our voicemail line or the front office at [phone] to scheduled your next appointment.  Family is encouraged to e-mail or mychart us to update how the plan is working.    A total of 60 minutes, not including infant assessment time, with more than 50% was spent preparing to see the patient, obtaining and reviewing separately obtained history, performing a medically appropriate examination and evaluation, counseling and educating the family, referring and communicating with other health care professionals, documenting clinical information in the electronic health record, independently interpreting weighted feeds and infant growth results, communicating these results to the family and care coordination as detailed in the above note.       GENARO Crowley.

## 2023-02-10 ENCOUNTER — OFFICE VISIT (OUTPATIENT)
Dept: OBGYN | Facility: CLINIC | Age: 28
End: 2023-02-10
Payer: COMMERCIAL

## 2023-02-10 DIAGNOSIS — O92.29 SORE NIPPLES DUE TO LACTATION: ICD-10-CM

## 2023-02-10 DIAGNOSIS — O92.70 LACTATION PROBLEM: ICD-10-CM

## 2023-02-10 PROCEDURE — 99215 OFFICE O/P EST HI 40 MIN: CPT | Performed by: NURSE PRACTITIONER

## 2023-02-10 NOTE — PROGRESS NOTES
Summary: Beautiful transition from a very difficulty start. Mom has successfully taught Tc to breastfeed. For the last week plus, mom is no longer pumping, breastfeeds one side each time in the day and both sides at night. Her left nipple is sore. Tc has had excellent weight gain. Cluster feeding in the evening and does not sleep very long stretches at night. Mom has surrounded herself with a good support system of family and friends and knows the value of getting out.  Today: Independently latched and shown options for a deeper latch and experienced that with no pain. He removed 3.2oz and content being held by mom. Discussed sleep, cluster feeding, nipple treatments. Pumping not indicated  Plan: Adjust latch for depth to allow the left nipple to heal, over correcting latch for comfort. Cluster feeding may be more an indicator of overstimulation and being worn and pacified may help better than feedings. If breastfeeding may leave him on one side longer so he can suck and not continuously take in food. Pump for dad to continue providing a snack bottle several times a week so he doesn't lose that pattern. Colic remedies discussed  Follow up:   Lactation appointment: Group Encouraged   Baby 's Provider appointment: 2 month C  Referrals: None    Subjective:     Kelvin Riggins is a 27 y.o. female here for lactation care. She is here today with baby    Concerns:   Maternal and Infant: Breastfeeding and weight evaluation     HPI:   Pertinent  history: , preelampsia with severe features, supported with MgSO4    Mother does not have a history of advanced maternal age, hypertension prior to pregnancy, insulin resistance, multiple gestation, PCOS, and thyroid disease. Common condition(s) which may interfere with milk supply.    Mother does have GDM, GHTN, and auto immune disease (IBS). Common condition(s) that may interfere in milk supply.    Breast changes in pregnancy: Yes, leaking in  pregnancy  History of breast surgeries: No    FEEDING HISTORY:    Previous Breastfeeding History: First baby.   Hospital Course: 36.1 week, started on LPI protocol, home triple feeding  Prior to consultation on 1/19/2023: Home triple feeding but not sustainable. Lansinoh pump single, making enough milk for the next feeding. Has milk frozen from the first days when baby as taking less. No formula. Breastfeeding limited as he falls asleep   Currently 2/10/2023 Beautiful transition from a very difficulty start. Mom has successfully taught Tc to breastfeed. For the last week plus, mom is no longer pumping, breastfeeds one side each time in the day and both sides at night. Her left nipple is sore. Tc has had excellent weight gain. Cluster feeding in the evening and does not sleep very long stretches at night. Mom has surrounded herself with a good support system of family and friends and knows the value of getting out.    Both breasts: No  in the day, yes at night    Supplement: Supplementation initiated inpatient and Expressed breast milk  Quantity: 3oz now if bottle   How given/devices:  Bottle    Nipple Shield Use: None    Breast Pumping:   Frequency as desired , Has 2 days worth in the freezer  NO pain with pumping    Maternal ROS:  Constitutional: No fever, chills. Feeling well  Breasts: No soreness of breasts and Soreness of nipples from pumping resolved, left nipple sore with breastfeeding  Psychiatric:Managing well  Mental Health: No mention of feeling irritable, agitated, angry, overwhelmed, apathetic, appetite changes, exhausted nor having sleep changes outside infant feeds/demands.  Current Outpatient Medications on File Prior to Visit   Medication Sig Dispense Refill    ibuprofen (MOTRIN) 800 MG Tab Take 1 Tablet by mouth every 8 hours as needed for Moderate Pain. 30 Tablet 0    Prenatal MV-Min-Fe Fum-FA-DHA (PRENATAL 1 PO) Take  by mouth every evening.      ferrous gluconate (FERGON) 324 (38 Fe) MG Tab  Take 324 mg by mouth every evening.      docusate sodium (COLACE) 100 MG Cap Take 100 mg by mouth 2 times a day.      famotidine (PEPCID) 20 MG Tab Take 20 mg by mouth 2 times a day.       No current facility-administered medications on file prior to visit.      Past Medical History:   Diagnosis Date    Allergy     Anxiety     ASTHMA     child -cifuentes.  controlled without meds since 6 yrs old    Clotting disorder (HCC)     patient unsure of the specifics (MTHFR) - heterozygous carrier     Depression     IBS (irritable bowel syndrome)     Migraine          Objective:     Maternal Physical Lactation Exam  General: No acute distress  Breasts: Symmetrical , Soft, Plugged Duct - no evidence, and Mastitis  - no S/S  NIpples intact  Psychiatric: Normal mood and affect. Her behavior is normal. Judgment and thought content normal.  Mental Health: Did NOT exhibit sadness, crying, feeling overwhelmed, agitation or hypervigilance.    Assessment/Plan & Lactation Counseling:     Infant Weight History:   1/11/23  6#13.5oz  1/16/23  6#15.3oz hospital  11/16/23  6#10oz ped office before hospital admission.  1/19/23 6#13.2oz naked at peds, jaundice follow up visit.  2/10/2023 8#10.1oz    nfant Intake at Breast: 3.2oz left     not offered the right    Total: 3.2oz  Milk Transfer at this feeding:   Effective breastfeeding     Pumped: Not indicated   Initiation of Feeding: Infant initiates  Position of Feeding:    Right: not offered  Left: cross cradle  Attachment Achieved: rapidly  Nipple shield: N/A      Suck Pattern at the Breast: Suck burst and normal rest  Suck Pattern on the Bottle: Not Indicated     Behavior Following Observed Feeding: content  Nipple Pain from:Contact forces of the tongue causing nipple strain resulting in damage     Latch: Mom latches independently and Assisted latch  Suckling/Feeding: attaches, audible swallows, baby fed effectively, baby roots, elicits MARCE, and rhythmic  Sucking strength: Moderate  Strong  Sucking Rhythm Coordinated   Compression: WNL    Once latched, baby fell into a mature and fully integrated SSB pattern.    Swallowing No difficulty noted  Milk Supply Available: normal    Nipple Pain from: latch  Milk Supply Available: normal    Maternal Diagnosis/Problem:  Lactation problem, managing breastfeeding now  Sore nipples    MATERNAL PERSONALIZED BREASTFEEDING PLAN  Discussed concerns and symptoms as listed above in assessment and guidance summarized below. Shared decision making was used between myself and the family for this encounter, home care, and follow up. Topics reviewed included:   LPIs (Late  Infants 36-37 Weeks)The early baby born before 37 weeks of gestation is very prone to be sleepy at the breast, especially in the first several weeks. The most common reason for lack of milk transfer in newborns is simply falling asleep at the breast. It can sometimes be very difficult to determine whether sleepiness is due to insufficient transfer from a tongue tie/oromotor issues, or whether they are really primarily sleepy. If a baby needs to be woken frequently to feed during the day and/or falls asleep bottle feeding, they are NOT ready for prime time at the breast, so please don't expect them to transfer sufficiently at the breast.  Infants who are sleepy can take 3 and, very occasionally, 4-6 weeks after their due date to be strong enough to transfer at every feeding. One way to know if this is the issue for this baby is if he has 1-2 feeds during the day when he is great at the breast, transfers sufficient volume, and does not need a bottle. This would typically occur after a good nap, or first feed in the am. I would encourage you to feed the baby less often at the breast, so mom can decrease the frequency of triple feeds. Pump and bottle feed several of the feeds. Once the baby demonstrates success with a few feeds at the breast, then gradually increase the # of feeds at the breast.  Triple feeding should never last longer than a week, double feeding yes, not triple 24/. Sustainability is critical. This is what the mom has accomplished  Milk Supply is dependent on glandular tissue development, hormonal influences, how many times the baby removes milk and how well the breasts are emptied in a 24 hour period. This is a biological reality that we can NOT work around. If, for any reason, your baby is not latching, or you are not able to nurse, then it is important for you to remove the milk instead by pumping or hand expression.  There's no magic trick, tea, food, drink, cookie or supplement that will increase your milk supply. One  must  effectively remove milk to continue to make and maximize milk. In the early days and weeks that can be 8+ times in 24 hours. For older babies, on average 6-7 + times in 24 hours.    Feeding:   Infant feeding well given current interval growth, guidelines to follow:  Feed your baby every 1.5-3 hours, more often if baby acts hungry.   Awaken baby for feeding if going over 3 hours in the day.   No alarms at night needed, feeding every 3 hours, may be altered with more feedings in the day if he is interested.    Daily goal: 8-12 feedings per 24 hours.   Supplement:   No Supplement needed    Pacifier Use:  The American Academy of Pediatrics' Position Paper reports: Although we recommend a conservative approach regarding pacifier use, we do not endorse a complete ban on the use of pacifiers, nor do we support an approach that induces parental guilt concerning their choices about the use of pacifiers. Pacifier use and breastfeeding in term and  newborns- a systematic review and meta-analysis from the  J of Pediatrics Published online 2022. Has found that when pacifiers are used among individuals who have been counseled on the risks, do not interfere with breastfeeding exclusivity or duration. These are parental choices.    Positioning Techniques  for Bare Breast  Pillow used: Anna 2 Nu Dwight  Suggested positions Cross cradle  Fine tune position by making sure your fingers beneath the breast as well as your bra, are out of the way of your baby's chin.  Positioning: See http://globalhealthmedia.org/portfolio-items/positions-for-breastfeeding/?gfbkchwabHQ=22390    Latch on Techniques for Bare Breast.   Aim is an asymmetric deep latch.  First make yourself comfortable. Sit with knees bent (unless lying down), feet on a stool if needed. You will support your baby, and pillows will be used to support YOU. You want your baby's belly facing your breast/body (belly to belly). If your baby is extremely fussy and crying, do skin-to-skin for a while until he or she calms down, then try (or try again).   Fine tune latch:  By holding your baby more securely at the breast, assisting your baby to stay attached by:  Support your baby with your hand behind the shoulder blades (NOT THE HEAD).  1. Align your baby's NOSE with your nipple  2. Your baby's chin should be the first part of his or her body to touch your breast  3. Tickle the baby's upper lip or nose with your nipple  4. When your baby's mouth is WIDE OPEN, swiftly bring your baby's mouth over your nipple/areola.  Steps 2 and 3 could be slightly reversed order or essentially happening at the same time.  Bringing your baby to your breast, not breast to the baby  Your baby's cheek to touch breast securely, nose tipped back  Hold your baby firmly in place so when your baby forgets to suck and picks it back up again your baby is in the correct spot. You will be extinguishing behavior and replacing it with a deeper latch to stimulate suck and provide satisfaction at the breast.  Your baby needs as much breast as deep in the mouth as possible to allow your nipples to heal and for you more importantly to maximize efficiency at the breast  If that doesn’t help, you should make an appointment with me to re-evaluate.   Latch  "is asymmetrical, leading with the chin, getting the baby below the breast, as if offering a large \"deli laurie sandwich\".  Here is a video from [employer] on the asymmetric latch- https://www.youCrescendo Biologicsube.com/watch?v=0I-JWk3Yt36    Pumping Guidelines:  As needed for snack bottle several times per week    Caution with Breast Massage The word “Massage” means different things in the breastfeeding world. It is described and interpreted in so many different ways and unfortunately potentially harmful. The hands can be safe on a breast and  gentle to help in many ways but they also can be damaging when used in the wrong way.  Lymphatic drainage massage is appropriate,  open hand , lightly stroking only, and can be highly effective. The massage advice to knead , massage deeply , vibrate with marketed tools is  most concerning. Be gentle with this gland to not increase inflammation.     Nipple care:    May apply breastmilk  Moist-oily ointment after feeding/pumping, ie Lanolin nipple butter, coconut or olive oil, if desired/needed 2-3 times/day until nipples are healed  You do not need to wash this off before pumping or feeding the baby  Hydrogels recommended  Hydrogels Soothies when you need to provide moisture  Silverettes with milk appropriate for breastmilk application    Connect with other mothers:  [facility]  WEIGHT CHECKS  Tuesdays 10am - 11am. [facility] at [address] [place] [address], [address] [place] [address], 3rd floor conference room  Check your baby's weight, do a feeding and see how your baby is growing, visit with other mothers, plan on a walk or coffee date after group.  Please download Growth: Baby and Child for Apple or Child Growth Tracker for Flightfox if you would like to  document and follow your baby's growth curve.  Please wear a mask coming and going: you may remove it in the classroom  Due to space limitations - limit strollers please (New c/section moms please use your stroller).  We would love to have dads stay, " but moms won't breastfeed if there are men in the room, sorry.  The room is generally scheduled for another event following group.  Please take all diapers with you   ONLINE SUPPORT GROUPS  [employer] ([employer]) support groups are conducted using a [id number]-vt-zyxo support model and are not intended for those experiencing a mental health crisis. Groups are 90 minutes (1.5 hours) in length. The first ~30 minutes is providing information, education, and establishing group guidelines. The next ~60 minutes is “talk time,” in which group members share and talk with each other. Group members must be present for the group guidelines before joining in the discussion or “talk time.”     Follow up requires close monitoring in this time sensitive window of opportunity to establish milk supply and facilitate the learning of  breastfeeding as Tc gets older in the next days/weeks    Please call [phone] our voicemail line or the front office at [phone] to scheduled your next appointment.  Family is encouraged to e-mail or mychart us to update how the plan is working.    A total of 45 minutes, not including infant assessment time, with more than 50% was spent preparing to see the patient, obtaining and reviewing separately obtained history, performing a medically appropriate examination and evaluation, counseling and educating the family, referring and communicating with other health care professionals, documenting clinical information in the electronic health record, independently interpreting weighted feeds and infant growth results, communicating these results to the family and care coordination as detailed in the above note.       GENARO Crowley.

## 2023-06-11 ENCOUNTER — HOSPITAL ENCOUNTER (OUTPATIENT)
Facility: MEDICAL CENTER | Age: 28
End: 2023-06-11
Attending: NURSE PRACTITIONER
Payer: COMMERCIAL

## 2023-06-11 ENCOUNTER — OFFICE VISIT (OUTPATIENT)
Dept: URGENT CARE | Facility: CLINIC | Age: 28
End: 2023-06-11
Payer: COMMERCIAL

## 2023-06-11 VITALS
BODY MASS INDEX: 33.43 KG/M2 | SYSTOLIC BLOOD PRESSURE: 118 MMHG | TEMPERATURE: 98 F | OXYGEN SATURATION: 97 % | DIASTOLIC BLOOD PRESSURE: 70 MMHG | HEART RATE: 90 BPM | HEIGHT: 66 IN | RESPIRATION RATE: 20 BRPM | WEIGHT: 208 LBS

## 2023-06-11 DIAGNOSIS — R30.0 DYSURIA: ICD-10-CM

## 2023-06-11 DIAGNOSIS — N30.01 ACUTE CYSTITIS WITH HEMATURIA: ICD-10-CM

## 2023-06-11 LAB
APPEARANCE UR: NORMAL
BILIRUB UR STRIP-MCNC: NORMAL MG/DL
COLOR UR AUTO: NORMAL
GLUCOSE UR STRIP.AUTO-MCNC: NORMAL MG/DL
KETONES UR STRIP.AUTO-MCNC: NORMAL MG/DL
LEUKOCYTE ESTERASE UR QL STRIP.AUTO: NORMAL
NITRITE UR QL STRIP.AUTO: NORMAL
PH UR STRIP.AUTO: 6 [PH] (ref 5–8)
POCT INT CON NEG: NEGATIVE
POCT INT CON POS: POSITIVE
POCT URINE PREGNANCY TEST: NEGATIVE
PROT UR QL STRIP: 100 MG/DL
RBC UR QL AUTO: NORMAL
SP GR UR STRIP.AUTO: 1.01
UROBILINOGEN UR STRIP-MCNC: 0.2 MG/DL

## 2023-06-11 PROCEDURE — 81002 URINALYSIS NONAUTO W/O SCOPE: CPT | Performed by: NURSE PRACTITIONER

## 2023-06-11 PROCEDURE — 99213 OFFICE O/P EST LOW 20 MIN: CPT | Performed by: NURSE PRACTITIONER

## 2023-06-11 PROCEDURE — 3074F SYST BP LT 130 MM HG: CPT | Performed by: NURSE PRACTITIONER

## 2023-06-11 PROCEDURE — 87086 URINE CULTURE/COLONY COUNT: CPT

## 2023-06-11 PROCEDURE — 87077 CULTURE AEROBIC IDENTIFY: CPT

## 2023-06-11 PROCEDURE — 3078F DIAST BP <80 MM HG: CPT | Performed by: NURSE PRACTITIONER

## 2023-06-11 PROCEDURE — 81025 URINE PREGNANCY TEST: CPT | Performed by: NURSE PRACTITIONER

## 2023-06-11 RX ORDER — NORETHINDRONE 0.35 MG/1
TABLET ORAL
COMMUNITY
Start: 2023-05-10

## 2023-06-11 RX ORDER — CEFDINIR 300 MG/1
300 CAPSULE ORAL 2 TIMES DAILY
Qty: 20 CAPSULE | Refills: 0 | Status: SHIPPED | OUTPATIENT
Start: 2023-06-11 | End: 2023-06-21

## 2023-06-11 ASSESSMENT — FIBROSIS 4 INDEX: FIB4 SCORE: 0.45

## 2023-06-11 NOTE — PROGRESS NOTES
Patient has consented to treatment and for use of patient information for treatment and billing purposes.    Date: 06/11/23     Arrival Mode: Private Vehicle    Chief Complaint:    Chief Complaint   Patient presents with    Painful Urination     Urgency, blood in urine x last night         History of Present Illness: 27 y.o.  female presents to clinic with burning with urination and urinary urgency and frequency that began at 5 AM this morning.  Patient states she does have a history of recurrent urinary tract infections.  This does feel similar.  Patient denies any vaginal symptoms including increased vaginal discharge or itching.  Patient is currently 5 months postpartum she did have a Devine catheter while in the hospital.  She is currently breast-feeding.  She denies any fevers or body aches.  She does admit to some mild flank pain.  She denies that this pain is colic like.  She does have some pelvic pressure.  No nausea vomiting or diarrhea.  States she feels generally unwell.      ROS:    As stated in HPI     Pertinent Medical History:  Past Medical History:   Diagnosis Date    Allergy     Anxiety     ASTHMA     child -cifuentes.  controlled without meds since 6 yrs old    Clotting disorder (HCC)     patient unsure of the specifics (MTHFR) - heterozygous carrier     Depression     IBS (irritable bowel syndrome)     Migraine         Pertinent Surgical History:  Past Surgical History:   Procedure Laterality Date    KNEE ARTHROSCOPY  1/8/2009    Performed by SHAD CORDOBA at SURGERY [facility]S    SYNOVECTOMY  1/8/2009    Performed by SHAD CORDOBA at SURGERY [facility]        Pertinent Medications:    Current Outpatient Medications on File Prior to Visit   Medication Sig Dispense Refill    JUSTEN 0.35 MG tablet TAKE 1 TABLET BY MOUTH EVERY DAY FOR 84 DAYS      ibuprofen (MOTRIN) 800 MG Tab Take 1 Tablet by mouth every 8 hours as needed for Moderate Pain. 30 Tablet 0    Prenatal MV-Min-Fe Fum-FA-DHA  (PRENATAL 1 PO) Take  by mouth every evening.      ferrous gluconate (FERGON) 324 (38 Fe) MG Tab Take 324 mg by mouth every evening.      docusate sodium (COLACE) 100 MG Cap Take 100 mg by mouth 2 times a day.      famotidine (PEPCID) 20 MG Tab Take 20 mg by mouth 2 times a day.       No current facility-administered medications on file prior to visit.        Allergies:    Albumin, Apple, Bactrim, Broccoli [brassica oleracea italica], Crab, Sulfamethoxazole w-trimethoprim, Tuna, and Pcn [penicillins]     Social History:  Social History     Tobacco Use    Smoking status: Never    Smokeless tobacco: Never   Vaping Use    Vaping Use: Never used   Substance Use Topics    Alcohol use: Not Currently     Comment: Occ    Drug use: No        No LMP recorded.           Physical Exam:    Vitals:    06/11/23 0948   BP: 118/70   Pulse: 90   Resp: 20   Temp: 36.7 °C (98 °F)   SpO2: 97%             Physical Exam  Constitutional:       General: She is not in acute distress.     Appearance: Normal appearance. She is well-developed. She is not ill-appearing or toxic-appearing.   HENT:      Head: Normocephalic and atraumatic.   Cardiovascular:      Rate and Rhythm: Normal rate and regular rhythm.      Heart sounds: Normal heart sounds.   Pulmonary:      Effort: Pulmonary effort is normal. No respiratory distress.      Breath sounds: Normal breath sounds. No wheezing.   Abdominal:      General: Abdomen is flat. Bowel sounds are normal.      Palpations: Abdomen is soft.      Tenderness: There is no abdominal tenderness. There is right CVA tenderness and left CVA tenderness. There is no guarding or rebound.      Comments: Mild cva tenderness    Skin:     General: Skin is warm.      Capillary Refill: Capillary refill takes less than 2 seconds.      Coloration: Skin is not cyanotic or pale.   Neurological:      Mental Status: She is alert and oriented to person, place, and time.      Gait: Gait is intact.   Psychiatric:         Behavior:  Behavior normal. Behavior is cooperative.                  Diagnostics:      Recent Results (from the past 8 hour(s))   POCT Urinalysis    Collection Time: 06/11/23  9:57 AM   Result Value Ref Range    POC Color PINK Negative    POC Appearance CLOUDY Negative    POC Glucose NEG Negative mg/dL    POC Bilirubin NEG Negative mg/dL    POC Ketones NEG Negative mg/dL    POC Specific Gravity 1.010 <1.005 - >1.030    POC Blood LARGE Negative    POC Urine PH 6.0 5.0 - 8.0    POC Protein 100 Negative mg/dL    POC Urobiligen 0.2 Negative (0.2) mg/dL    POC Nitrites NEG Negative    POC Leukocyte Esterase SMALL Negative   POCT PREGNANCY    Collection Time: 06/11/23  9:57 AM   Result Value Ref Range    POC Urine Pregnancy Test Negative     Internal Control Positive Positive     Internal Control Negative Negative           Diagnostics interpreted by myself.      Medical Decision making and clinic course :  I personally reviewed prior external notes and test results pertinent to today's visit. Pt is clinically stable at today's acute urgent care visit.  No acute distress noted. Appropriate for outpatient care at this time. Shared decision-making was utilized with patient for treatment plan.    Pleasant nontoxic-appearing 27-year-old female presenting clinic with 1 day of urinary frequency urgency and painful urination.  POCT urinalysis does show small leukocytes as well as hematuria.  Patient has very mild flank pain on exam.  Patient is allergic to penicillins has tolerated Keflex prior.  Did send for 10-day course of cefdinir as 10 days will cover the kidneys.  Discussed finishing the antibiotics in their entirety.  Urine culture is pending.  Discussed that if urine culture indicates a change is needed antibiotics will discuss through MyChart patient did verbalize understanding and confirm that she has access to her MyChart.        The patient remained stable during the urgent care visit.    Plan:    Medication discussed included  indication for use and the potential  benefits and side effects.     1. Dysuria    - POCT Urinalysis  - URINE CULTURE(NEW); Future  - POCT PREGNANCY    2. Acute cystitis with hematuria    - cefdinir (OMNICEF) 300 MG Cap; Take 1 Capsule by mouth 2 times a day for 10 days.  Dispense: 20 Capsule; Refill: 0      All of the patient's questions were answered to their satisfaction at the time of discharge.    Follow up:    Recommended f/u in  3 days  if there is no improvement.    Patient was encouraged to monitor symptoms closely. Those signs and symptoms which would warrant concern and mandate seeking a higher level of service through the emergency department discussed at length and included in discharge papers.  Patient stated agreement and understanding of this plan of care.    Disposition:  Home in stable condition       Voice Recognition Disclaimer:  Portions of this document were created using voice recognition software. The software does have a chance of producing errors of grammar and possibly content. I have made every reasonable attempt to correct obvious errors, but there may be errors of grammar and possibly content that I did not discover before finalizing the documentation.    Joana Black, A.P.R.N.

## 2023-06-13 LAB
BACTERIA UR CULT: NORMAL
SIGNIFICANT IND 70042: NORMAL
SITE SITE: NORMAL
SOURCE SOURCE: NORMAL

## 2023-11-06 ENCOUNTER — OFFICE VISIT (OUTPATIENT)
Dept: URGENT CARE | Facility: CLINIC | Age: 28
End: 2023-11-06
Payer: COMMERCIAL

## 2023-11-06 VITALS
SYSTOLIC BLOOD PRESSURE: 114 MMHG | TEMPERATURE: 98.7 F | DIASTOLIC BLOOD PRESSURE: 72 MMHG | RESPIRATION RATE: 16 BRPM | HEIGHT: 67 IN | WEIGHT: 210 LBS | BODY MASS INDEX: 32.96 KG/M2 | OXYGEN SATURATION: 96 % | HEART RATE: 90 BPM

## 2023-11-06 DIAGNOSIS — H10.32 ACUTE CONJUNCTIVITIS OF LEFT EYE, UNSPECIFIED ACUTE CONJUNCTIVITIS TYPE: ICD-10-CM

## 2023-11-06 PROCEDURE — 99213 OFFICE O/P EST LOW 20 MIN: CPT | Performed by: NURSE PRACTITIONER

## 2023-11-06 PROCEDURE — 3078F DIAST BP <80 MM HG: CPT | Performed by: NURSE PRACTITIONER

## 2023-11-06 PROCEDURE — 3074F SYST BP LT 130 MM HG: CPT | Performed by: NURSE PRACTITIONER

## 2023-11-06 RX ORDER — OFLOXACIN 3 MG/ML
1 SOLUTION/ DROPS OPHTHALMIC 4 TIMES DAILY
Qty: 5 ML | Refills: 0 | Status: SHIPPED | OUTPATIENT
Start: 2023-11-06 | End: 2023-11-11

## 2023-11-06 ASSESSMENT — ENCOUNTER SYMPTOMS
EYE DISCHARGE: 1
EYE REDNESS: 1
FOREIGN BODY SENSATION: 0

## 2023-11-06 ASSESSMENT — FIBROSIS 4 INDEX: FIB4 SCORE: 0.47

## 2023-11-06 NOTE — PROGRESS NOTES
Subjective     Laquita Oliva Riggins is a 28 y.o. female who presents with Eye Problem (Left eye, Started yesterday, Swollen, discharge from eye, Sore throat/X2-3 days Left ear pain)            Eye Problem   The left eye is affected. This is a new problem. Episode onset: pt reports new onset of left eye swelling, irritation and drainage that started yesterday. this morning her eye was swollen shut and had crust. associated left ear pain and throat pain. no fevers or chills. There was no injury mechanism. Associated symptoms include an eye discharge and eye redness. Pertinent negatives include no foreign body sensation. She has tried eye drops (warm compress, visine drops) for the symptoms. The treatment provided no relief.       Review of Systems   Eyes:  Positive for discharge and redness.   All other systems reviewed and are negative.         Past Medical History:   Diagnosis Date    Allergy     Anxiety     ASTHMA     child -cifuentes.  controlled without meds since 6 yrs old    Clotting disorder (HCC)     patient unsure of the specifics (MTHFR) - heterozygous carrier     Depression     IBS (irritable bowel syndrome)     Migraine       Past Surgical History:   Procedure Laterality Date    KNEE ARTHROSCOPY  1/8/2009    Performed by SHAD CORDOBA at SURGERY [facility]    SYNOVECTOMY  1/8/2009    Performed by SHAD CORDOBA at SURGERY [facility]      Social History     Socioeconomic History    Marital status: Single     Spouse name: Not on file    Number of children: Not on file    Years of education: Not on file    Highest education level: Not on file   Occupational History    Not on file   Tobacco Use    Smoking status: Never    Smokeless tobacco: Never   Vaping Use    Vaping Use: Never used   Substance and Sexual Activity    Alcohol use: Not Currently     Comment: Occ    Drug use: No    Sexual activity: Yes     Birth control/protection: Pill   Other Topics Concern    Not on file   Social History  "Narrative    Not on file     Social Determinants of Health     Financial Resource Strain: Not on file   Food Insecurity: Not on file   Transportation Needs: Not on file   Physical Activity: Not on file   Stress: Not on file   Social Connections: Not on file   Intimate Partner Violence: Not on file   Housing Stability: Not on file         Objective     /72 (BP Location: Right arm, Patient Position: Sitting, BP Cuff Size: Large adult)   Pulse 90   Temp 37.1 °C (98.7 °F) (Temporal)   Resp 16   Ht 1.689 m (5' 6.5\")   Wt 95.3 kg (210 lb) Comment: Pt stated  LMP 10/31/2023 (Approximate)   SpO2 96%   Breastfeeding No   BMI 33.39 kg/m²      Physical Exam  Vitals and nursing note reviewed.   Constitutional:       Appearance: Normal appearance. She is normal weight.   HENT:      Head: Normocephalic and atraumatic.      Left Ear: Tympanic membrane and external ear normal.      Nose: Nose normal.      Mouth/Throat:      Mouth: Mucous membranes are moist.      Pharynx: Oropharynx is clear. No posterior oropharyngeal erythema.   Eyes:      Extraocular Movements: Extraocular movements intact.      Conjunctiva/sclera:      Left eye: Left conjunctiva is injected. Exudate present.      Pupils: Pupils are equal, round, and reactive to light.   Cardiovascular:      Rate and Rhythm: Normal rate and regular rhythm.   Pulmonary:      Effort: Pulmonary effort is normal.   Musculoskeletal:         General: Normal range of motion.      Cervical back: Normal range of motion.   Skin:     General: Skin is warm and dry.      Capillary Refill: Capillary refill takes less than 2 seconds.   Neurological:      General: No focal deficit present.      Mental Status: She is alert and oriented to person, place, and time. Mental status is at baseline.   Psychiatric:         Mood and Affect: Mood normal.         Speech: Speech normal.         Thought Content: Thought content normal.         Judgment: Judgment normal.                       "       Assessment & Plan        1. Acute conjunctivitis of left eye, unspecified acute conjunctivitis type  - ofloxacin (OCUFLOX) 0.3 % Solution; Administer 1 Drop into the left eye 4 times a day for 5 days.  Dispense: 5 mL; Refill: 0       Warm compresses to affected eye(s) 3 times daily  Hand hygiene discussed  Wash all recently used linens and towels in hot water  Do not touch dropper to eye (s)  Supportive care, differential diagnoses, and indications for immediate follow-up discussed with patient.    Pathogenesis of diagnosis discussed including typical length and natural progression.    Instructed to return to  or nearest emergency department if symptoms fail to improve, for any change in condition, further concerns, or new concerning symptoms.  Patient states understanding of the plan of care and discharge instructions.

## 2024-03-04 ENCOUNTER — OFFICE VISIT (OUTPATIENT)
Dept: MEDICAL GROUP | Facility: LAB | Age: 29
End: 2024-03-04
Payer: COMMERCIAL

## 2024-03-04 VITALS
RESPIRATION RATE: 12 BRPM | BODY MASS INDEX: 36.96 KG/M2 | DIASTOLIC BLOOD PRESSURE: 80 MMHG | HEIGHT: 66 IN | HEART RATE: 90 BPM | WEIGHT: 230 LBS | TEMPERATURE: 97.5 F | OXYGEN SATURATION: 96 % | SYSTOLIC BLOOD PRESSURE: 120 MMHG

## 2024-03-04 DIAGNOSIS — R05.9 COUGH, UNSPECIFIED TYPE: ICD-10-CM

## 2024-03-04 DIAGNOSIS — J01.00 ACUTE NON-RECURRENT MAXILLARY SINUSITIS: ICD-10-CM

## 2024-03-04 LAB
FLUAV RNA SPEC QL NAA+PROBE: NEGATIVE
FLUBV RNA SPEC QL NAA+PROBE: NEGATIVE
RSV RNA SPEC QL NAA+PROBE: NEGATIVE
SARS-COV-2 RNA RESP QL NAA+PROBE: NEGATIVE

## 2024-03-04 PROCEDURE — 0241U POCT CEPHEID COV-2, FLU A/B, RSV - PCR: CPT | Performed by: NURSE PRACTITIONER

## 2024-03-04 PROCEDURE — 3079F DIAST BP 80-89 MM HG: CPT | Performed by: NURSE PRACTITIONER

## 2024-03-04 PROCEDURE — 3074F SYST BP LT 130 MM HG: CPT | Performed by: NURSE PRACTITIONER

## 2024-03-04 PROCEDURE — 99213 OFFICE O/P EST LOW 20 MIN: CPT | Performed by: NURSE PRACTITIONER

## 2024-03-04 RX ORDER — DOXYCYCLINE HYCLATE 100 MG
100 TABLET ORAL 2 TIMES DAILY
Qty: 14 TABLET | Refills: 0 | Status: SHIPPED | OUTPATIENT
Start: 2024-03-04 | End: 2024-03-11

## 2024-03-04 RX ORDER — ALBUTEROL SULFATE 90 UG/1
2 AEROSOL, METERED RESPIRATORY (INHALATION) EVERY 4 HOURS PRN
Qty: 1 EACH | Refills: 5 | Status: SHIPPED | OUTPATIENT
Start: 2024-03-04

## 2024-03-04 ASSESSMENT — PATIENT HEALTH QUESTIONNAIRE - PHQ9: CLINICAL INTERPRETATION OF PHQ2 SCORE: 0

## 2024-03-04 ASSESSMENT — FIBROSIS 4 INDEX: FIB4 SCORE: 0.47

## 2024-03-04 NOTE — PROGRESS NOTES
"Verbal consent was acquired by the patient to use D1G ambient listening note generation during this visit Yes      Subjective   Laquita Day Gilson is a 28 y.o. female who presents for uri symptoms.   History of Present Illness  The patient is a 28-year-old established female here with chest congestion.    4 d duration. New issue.  On Friday, she thought it was allergies because she got a little stuffy in her head. She took some Sudafed and went on with her life. On Saturday, she woke up really congested in her head and was a little dizzy. She had intermittent fever, so she took some more Sudafed throughout the day. Yesterday morning, she woke up with chest soreness and was coughing a lot more. She was coughing up green phlegm in the mornings, but the longer she stayed up, the less green she got. Today, when she lays down, she gets dizzy. Her voice has been hoarse with coughing. She got winded from the parking lot walking in. Her 's work was sick on and off a couple of weeks ago with COVID-19 and influenza B, but they stayed clear of it. She is not currently breastfeeding. She tried Mucinex yesterday, but it did not really make a difference. She does not smoke. Her sore throat is not bad right now, but she has been drinking warm liquids. When she wakes up, her sore throat is bad. She has not had COVID-19 in the last 6 months. She has not had her influenza vaccine this year. She does not have an inhaler. She had COVID-19 infection in 2020.   She works in a bank as a teller.  Her 1-year-old is not in .  Her  works in online gun sales.    Review of Systems  Neg except HPI    Objective   /80 (BP Location: Left arm, Patient Position: Sitting, BP Cuff Size: Large adult)   Pulse 90   Temp 36.4 °C (97.5 °F)   Resp 12   Ht 1.676 m (5' 6\")   Wt 104 kg (230 lb)   SpO2 96%   Physical Exam  ENT:  throat is erythematous without exudates. TM is translucent bilaterally. Sinuses are tender - " maxillary only bilaterally   Lymph nodes tender.  Lungs:  CTA bilaterally without wheezing / rhonchit  CV: Normal rhythm. Rate is good.    Vital Signs  Temperature is 97. Oxygen saturation is normal.      Assessment & Plan  1. Acute uri:  Negative COVID, influenza and RSV testing.  She was advised to drink plenty of water. She can continue Mucinex. She can take Sudafed. She can take 1 teaspoon of honey every hour. She can use saline 4 times a day, gargle with it, and irrigate her sinuses.  May start antibiotics for a sinus infection, which I would like for her to start in about 72 hours if symptoms are not improving on their own.  Discussed viral versus bacterial infections and treatment for each.    She will follow-up within 72 hours if symptoms are not improving, sooner with worsening.         Please note that this dictation was created using voice recognition software. I have made every reasonable attempt to correct obvious errors, but I expect that there are errors of grammar and possibly content that I did not discover before finalizing the note.

## 2024-05-26 ENCOUNTER — PATIENT MESSAGE (OUTPATIENT)
Dept: MEDICAL GROUP | Facility: LAB | Age: 29
End: 2024-05-26
Payer: COMMERCIAL

## 2024-05-26 DIAGNOSIS — L50.9 URTICARIA: ICD-10-CM

## 2024-05-28 RX ORDER — EPINEPHRINE 0.3 MG/.3ML
0.3 INJECTION SUBCUTANEOUS ONCE
Qty: 0.3 ML | Refills: 0 | Status: SHIPPED | OUTPATIENT
Start: 2024-05-28 | End: 2024-05-28

## 2024-10-02 ENCOUNTER — OFFICE VISIT (OUTPATIENT)
Dept: MEDICAL GROUP | Facility: LAB | Age: 29
End: 2024-10-02
Payer: COMMERCIAL

## 2024-10-02 VITALS
SYSTOLIC BLOOD PRESSURE: 118 MMHG | DIASTOLIC BLOOD PRESSURE: 68 MMHG | HEART RATE: 86 BPM | BODY MASS INDEX: 38.25 KG/M2 | TEMPERATURE: 97.7 F | WEIGHT: 238 LBS | HEIGHT: 66 IN | RESPIRATION RATE: 16 BRPM | OXYGEN SATURATION: 95 %

## 2024-10-02 DIAGNOSIS — R42 DIZZINESS: ICD-10-CM

## 2024-10-02 DIAGNOSIS — Z00.00 PREVENTATIVE HEALTH CARE: ICD-10-CM

## 2024-10-02 DIAGNOSIS — G43.109 MIGRAINE WITH AURA AND WITHOUT STATUS MIGRAINOSUS, NOT INTRACTABLE: ICD-10-CM

## 2024-10-02 DIAGNOSIS — Z23 NEED FOR VACCINATION: ICD-10-CM

## 2024-10-02 DIAGNOSIS — Z97.5 IUD (INTRAUTERINE DEVICE) IN PLACE: ICD-10-CM

## 2024-10-02 PROBLEM — N92.0 MENORRHAGIA WITH REGULAR CYCLE: Status: RESOLVED | Noted: 2021-03-10 | Resolved: 2024-10-02

## 2024-10-02 PROBLEM — R74.01 TRANSAMINITIS: Status: RESOLVED | Noted: 2023-01-04 | Resolved: 2024-10-02

## 2024-10-02 PROCEDURE — 99214 OFFICE O/P EST MOD 30 MIN: CPT | Mod: 25 | Performed by: NURSE PRACTITIONER

## 2024-10-02 PROCEDURE — 90656 IIV3 VACC NO PRSV 0.5 ML IM: CPT | Performed by: NURSE PRACTITIONER

## 2024-10-02 PROCEDURE — 90471 IMMUNIZATION ADMIN: CPT | Performed by: NURSE PRACTITIONER

## 2024-10-02 PROCEDURE — 3074F SYST BP LT 130 MM HG: CPT | Performed by: NURSE PRACTITIONER

## 2024-10-02 PROCEDURE — 3078F DIAST BP <80 MM HG: CPT | Performed by: NURSE PRACTITIONER

## 2024-10-02 RX ORDER — MECLIZINE HCL 12.5 MG 12.5 MG/1
12.5-25 TABLET ORAL 3 TIMES DAILY PRN
Qty: 30 TABLET | Refills: 0 | Status: SHIPPED | OUTPATIENT
Start: 2024-10-02

## 2024-10-02 RX ORDER — RIZATRIPTAN BENZOATE 10 MG/1
10 TABLET, ORALLY DISINTEGRATING ORAL
Qty: 10 TABLET | Refills: 3 | Status: SHIPPED | OUTPATIENT
Start: 2024-10-02

## 2024-10-02 ASSESSMENT — FIBROSIS 4 INDEX: FIB4 SCORE: 0.47

## 2024-11-07 ENCOUNTER — HOSPITAL ENCOUNTER (OUTPATIENT)
Dept: LAB | Facility: MEDICAL CENTER | Age: 29
End: 2024-11-07
Attending: NURSE PRACTITIONER
Payer: COMMERCIAL

## 2024-11-07 ENCOUNTER — APPOINTMENT (OUTPATIENT)
Dept: MEDICAL GROUP | Facility: LAB | Age: 29
End: 2024-11-07
Payer: COMMERCIAL

## 2024-11-07 VITALS
RESPIRATION RATE: 12 BRPM | HEART RATE: 92 BPM | OXYGEN SATURATION: 95 % | HEIGHT: 66 IN | TEMPERATURE: 97.2 F | DIASTOLIC BLOOD PRESSURE: 68 MMHG | SYSTOLIC BLOOD PRESSURE: 108 MMHG | WEIGHT: 243 LBS | BODY MASS INDEX: 39.05 KG/M2

## 2024-11-07 DIAGNOSIS — Z00.00 PREVENTATIVE HEALTH CARE: ICD-10-CM

## 2024-11-07 DIAGNOSIS — L50.9 URTICARIA: ICD-10-CM

## 2024-11-07 LAB
ALBUMIN SERPL BCP-MCNC: 4.5 G/DL (ref 3.2–4.9)
ALBUMIN/GLOB SERPL: 1.2 G/DL
ALP SERPL-CCNC: 120 U/L (ref 30–99)
ALT SERPL-CCNC: 43 U/L (ref 2–50)
ANION GAP SERPL CALC-SCNC: 14 MMOL/L (ref 7–16)
AST SERPL-CCNC: 26 U/L (ref 12–45)
BASOPHILS # BLD AUTO: 0.7 % (ref 0–1.8)
BASOPHILS # BLD: 0.06 K/UL (ref 0–0.12)
BILIRUB SERPL-MCNC: 0.3 MG/DL (ref 0.1–1.5)
BUN SERPL-MCNC: 17 MG/DL (ref 8–22)
CALCIUM ALBUM COR SERPL-MCNC: 9.4 MG/DL (ref 8.5–10.5)
CALCIUM SERPL-MCNC: 9.8 MG/DL (ref 8.5–10.5)
CHLORIDE SERPL-SCNC: 103 MMOL/L (ref 96–112)
CHOLEST SERPL-MCNC: 171 MG/DL (ref 100–199)
CO2 SERPL-SCNC: 21 MMOL/L (ref 20–33)
CREAT SERPL-MCNC: 0.72 MG/DL (ref 0.5–1.4)
EOSINOPHIL # BLD AUTO: 0.1 K/UL (ref 0–0.51)
EOSINOPHIL NFR BLD: 1.2 % (ref 0–6.9)
ERYTHROCYTE [DISTWIDTH] IN BLOOD BY AUTOMATED COUNT: 43.6 FL (ref 35.9–50)
EST. AVERAGE GLUCOSE BLD GHB EST-MCNC: 120 MG/DL
FASTING STATUS PATIENT QL REPORTED: NORMAL
GFR SERPLBLD CREATININE-BSD FMLA CKD-EPI: 116 ML/MIN/1.73 M 2
GLOBULIN SER CALC-MCNC: 3.9 G/DL (ref 1.9–3.5)
GLUCOSE SERPL-MCNC: 90 MG/DL (ref 65–99)
HBA1C MFR BLD: 5.8 % (ref 4–5.6)
HCT VFR BLD AUTO: 42.5 % (ref 37–47)
HDLC SERPL-MCNC: 33 MG/DL
HGB BLD-MCNC: 13.4 G/DL (ref 12–16)
IMM GRANULOCYTES # BLD AUTO: 0.04 K/UL (ref 0–0.11)
IMM GRANULOCYTES NFR BLD AUTO: 0.5 % (ref 0–0.9)
LDLC SERPL CALC-MCNC: 111 MG/DL
LYMPHOCYTES # BLD AUTO: 2.41 K/UL (ref 1–4.8)
LYMPHOCYTES NFR BLD: 28.4 % (ref 22–41)
MCH RBC QN AUTO: 26.2 PG (ref 27–33)
MCHC RBC AUTO-ENTMCNC: 31.5 G/DL (ref 32.2–35.5)
MCV RBC AUTO: 83 FL (ref 81.4–97.8)
MONOCYTES # BLD AUTO: 0.53 K/UL (ref 0–0.85)
MONOCYTES NFR BLD AUTO: 6.2 % (ref 0–13.4)
NEUTROPHILS # BLD AUTO: 5.36 K/UL (ref 1.82–7.42)
NEUTROPHILS NFR BLD: 63 % (ref 44–72)
NRBC # BLD AUTO: 0 K/UL
NRBC BLD-RTO: 0 /100 WBC (ref 0–0.2)
PLATELET # BLD AUTO: 351 K/UL (ref 164–446)
PMV BLD AUTO: 10.5 FL (ref 9–12.9)
POTASSIUM SERPL-SCNC: 4.3 MMOL/L (ref 3.6–5.5)
PROT SERPL-MCNC: 8.4 G/DL (ref 6–8.2)
RBC # BLD AUTO: 5.12 M/UL (ref 4.2–5.4)
SODIUM SERPL-SCNC: 138 MMOL/L (ref 135–145)
TRIGL SERPL-MCNC: 137 MG/DL (ref 0–149)
TSH SERPL-ACNC: 3.43 UIU/ML (ref 0.35–5.5)
WBC # BLD AUTO: 8.5 K/UL (ref 4.8–10.8)

## 2024-11-07 PROCEDURE — 3074F SYST BP LT 130 MM HG: CPT | Performed by: NURSE PRACTITIONER

## 2024-11-07 PROCEDURE — 99213 OFFICE O/P EST LOW 20 MIN: CPT | Performed by: NURSE PRACTITIONER

## 2024-11-07 PROCEDURE — 3078F DIAST BP <80 MM HG: CPT | Performed by: NURSE PRACTITIONER

## 2024-11-07 PROCEDURE — 36415 COLL VENOUS BLD VENIPUNCTURE: CPT

## 2024-11-07 PROCEDURE — 80053 COMPREHEN METABOLIC PANEL: CPT

## 2024-11-07 PROCEDURE — 83036 HEMOGLOBIN GLYCOSYLATED A1C: CPT

## 2024-11-07 PROCEDURE — 80061 LIPID PANEL: CPT

## 2024-11-07 PROCEDURE — 85025 COMPLETE CBC W/AUTO DIFF WBC: CPT

## 2024-11-07 PROCEDURE — 84443 ASSAY THYROID STIM HORMONE: CPT

## 2024-11-07 RX ORDER — HYDROXYZINE HYDROCHLORIDE 25 MG/1
25 TABLET, FILM COATED ORAL 3 TIMES DAILY PRN
Qty: 30 TABLET | Refills: 2 | Status: SHIPPED | OUTPATIENT
Start: 2024-11-07

## 2024-11-07 ASSESSMENT — FIBROSIS 4 INDEX: FIB4 SCORE: 0.48

## 2024-11-07 NOTE — PROGRESS NOTES
"Chief Complaint   Patient presents with    Itching     Sensitive skin X 1 month       HPI:  Itching - new issue x a month.  Redness to skin with scratching.  Tried lotion / cortisone cream - no relief.  Tried cetaphil without improvement.  Cbd balm helps.  No hx of skin issues. Multiple food allergies - vomits with apple. Swelling with crab.  Last allergy appt was age 20.  Itching is 24/7 but improved during the day.  Itching is all over her body. Worse with warmth and constrictive clothes.  Taking claritin - switched from zyrtec recently.    Normal life stressors.   Nothing new to her in life environmentally..    Head and shoulders helped scalp itching.    Reduced warm shower.    Bk: coffee / eggs or bagel / oatmeal.  Lunch: left overs (no new diets)  Dinner: protein / carb / veg.   No wheezing / throat tightening.     Exam:   /68 (BP Location: Right arm, Patient Position: Sitting, BP Cuff Size: Large adult)   Pulse 92   Temp 36.2 °C (97.2 °F)   Resp 12   Ht 1.676 m (5' 6\")   Wt 110 kg (243 lb)   SpO2 95%   BMI 39.22 kg/m²     Gen: NAD  Resp: nonlabored.  Able to speak in full sentences.  CTA bilaterally.   Ent: Oropharynx without erythema or exudate.  Psy: pleasant / cooperative.   Neuro:  Alert and oriented x 3        Assessment / Plan:  #1-idiopathic urticaria: Needs further allergy workup and referral was placed.  In the meantime she can try hydroxyzine 12.5 to 25 mg up to 3 times daily if tolerated, otherwise only prior to bedtime and can utilize newer generation antihistamine in the morning such as Claritin, Allegra or Zyrtec.  She will continue to moisturize with Cetaphil or other neutral moisturizer.  She will let me know how long she is waiting on allergy consult and if the hydroxyzine is helpful or not.    Side effects of all medications prescribed today were discussed with the patient including how to take the medications and proper dosage. Discussed repercussions of not taking the " medications as prescribed. Instructed to call the office should she have any negative side effects or problems with the medications.

## 2025-04-15 ENCOUNTER — HOSPITAL ENCOUNTER (OUTPATIENT)
Dept: LAB | Facility: MEDICAL CENTER | Age: 30
End: 2025-04-15
Attending: NURSE PRACTITIONER
Payer: COMMERCIAL

## 2025-04-15 LAB
ALBUMIN SERPL BCP-MCNC: 4.2 G/DL (ref 3.2–4.9)
ALBUMIN/GLOB SERPL: 1.3 G/DL
ALP SERPL-CCNC: 88 U/L (ref 30–99)
ALT SERPL-CCNC: 51 U/L (ref 2–50)
ANION GAP SERPL CALC-SCNC: 12 MMOL/L (ref 7–16)
APPEARANCE UR: CLEAR
AST SERPL-CCNC: 23 U/L (ref 12–45)
BASOPHILS # BLD AUTO: 0.3 % (ref 0–1.8)
BASOPHILS # BLD: 0.03 K/UL (ref 0–0.12)
BILIRUB SERPL-MCNC: 0.4 MG/DL (ref 0.1–1.5)
BILIRUB UR QL STRIP.AUTO: NEGATIVE
BUN SERPL-MCNC: 17 MG/DL (ref 8–22)
C3 SERPL-MCNC: 172 MG/DL (ref 87–200)
C4 SERPL-MCNC: 28.2 MG/DL (ref 19–52)
CALCIUM ALBUM COR SERPL-MCNC: 9.3 MG/DL (ref 8.5–10.5)
CALCIUM SERPL-MCNC: 9.5 MG/DL (ref 8.5–10.5)
CHLORIDE SERPL-SCNC: 104 MMOL/L (ref 96–112)
CO2 SERPL-SCNC: 25 MMOL/L (ref 20–33)
COLOR UR: YELLOW
CREAT SERPL-MCNC: 0.83 MG/DL (ref 0.5–1.4)
EOSINOPHIL # BLD AUTO: 0.05 K/UL (ref 0–0.51)
EOSINOPHIL NFR BLD: 0.5 % (ref 0–6.9)
ERYTHROCYTE [DISTWIDTH] IN BLOOD BY AUTOMATED COUNT: 44 FL (ref 35.9–50)
ERYTHROCYTE [SEDIMENTATION RATE] IN BLOOD BY WESTERGREN METHOD: 11 MM/HOUR (ref 0–25)
GFR SERPLBLD CREATININE-BSD FMLA CKD-EPI: 97 ML/MIN/1.73 M 2
GLOBULIN SER CALC-MCNC: 3.3 G/DL (ref 1.9–3.5)
GLUCOSE SERPL-MCNC: 92 MG/DL (ref 65–99)
GLUCOSE UR STRIP.AUTO-MCNC: NEGATIVE MG/DL
HCT VFR BLD AUTO: 41.7 % (ref 37–47)
HGB BLD-MCNC: 12.9 G/DL (ref 12–16)
IMM GRANULOCYTES # BLD AUTO: 0.06 K/UL (ref 0–0.11)
IMM GRANULOCYTES NFR BLD AUTO: 0.6 % (ref 0–0.9)
KETONES UR STRIP.AUTO-MCNC: NEGATIVE MG/DL
LEUKOCYTE ESTERASE UR QL STRIP.AUTO: NEGATIVE
LYMPHOCYTES # BLD AUTO: 4.52 K/UL (ref 1–4.8)
LYMPHOCYTES NFR BLD: 44.1 % (ref 22–41)
MCH RBC QN AUTO: 25.9 PG (ref 27–33)
MCHC RBC AUTO-ENTMCNC: 30.9 G/DL (ref 32.2–35.5)
MCV RBC AUTO: 83.7 FL (ref 81.4–97.8)
MICRO URNS: NORMAL
MONOCYTES # BLD AUTO: 0.4 K/UL (ref 0–0.85)
MONOCYTES NFR BLD AUTO: 3.9 % (ref 0–13.4)
NEUTROPHILS # BLD AUTO: 5.18 K/UL (ref 1.82–7.42)
NEUTROPHILS NFR BLD: 50.6 % (ref 44–72)
NITRITE UR QL STRIP.AUTO: NEGATIVE
NRBC # BLD AUTO: 0 K/UL
NRBC BLD-RTO: 0 /100 WBC (ref 0–0.2)
PH UR STRIP.AUTO: 6.5 [PH] (ref 5–8)
PLATELET # BLD AUTO: 331 K/UL (ref 164–446)
PMV BLD AUTO: 10.5 FL (ref 9–12.9)
POTASSIUM SERPL-SCNC: 3.7 MMOL/L (ref 3.6–5.5)
PROT SERPL-MCNC: 7.5 G/DL (ref 6–8.2)
PROT UR QL STRIP: NEGATIVE MG/DL
RBC # BLD AUTO: 4.98 M/UL (ref 4.2–5.4)
RBC UR QL AUTO: NEGATIVE
SODIUM SERPL-SCNC: 141 MMOL/L (ref 135–145)
SP GR UR STRIP.AUTO: 1.03
TSH SERPL-ACNC: 3.19 UIU/ML (ref 0.38–5.33)
UROBILINOGEN UR STRIP.AUTO-MCNC: 1 EU/DL
WBC # BLD AUTO: 10.2 K/UL (ref 4.8–10.8)

## 2025-04-15 PROCEDURE — 86160 COMPLEMENT ANTIGEN: CPT

## 2025-04-15 PROCEDURE — 36415 COLL VENOUS BLD VENIPUNCTURE: CPT

## 2025-04-15 PROCEDURE — 81003 URINALYSIS AUTO W/O SCOPE: CPT

## 2025-04-15 PROCEDURE — 88184 FLOWCYTOMETRY/ TC 1 MARKER: CPT

## 2025-04-15 PROCEDURE — 85652 RBC SED RATE AUTOMATED: CPT

## 2025-04-15 PROCEDURE — 83520 IMMUNOASSAY QUANT NOS NONAB: CPT

## 2025-04-15 PROCEDURE — 88185 FLOWCYTOMETRY/TC ADD-ON: CPT

## 2025-04-15 PROCEDURE — 86038 ANTINUCLEAR ANTIBODIES: CPT

## 2025-04-15 PROCEDURE — 85025 COMPLETE CBC W/AUTO DIFF WBC: CPT

## 2025-04-15 PROCEDURE — 80053 COMPREHEN METABOLIC PANEL: CPT

## 2025-04-15 PROCEDURE — 84443 ASSAY THYROID STIM HORMONE: CPT

## 2025-04-16 LAB — NUCLEAR IGG SER QL IA: NORMAL

## 2025-04-17 LAB — TRYPTASE SERPL-MCNC: 5.9 UG/L

## 2025-04-18 LAB — URTIIND BASO ACT Q4770: 0 %

## 2049-03-08 ENCOUNTER — HOSPITAL ENCOUNTER (OUTPATIENT)
Facility: MEDICAL CENTER | Age: 54
End: 2049-03-08
Attending: PHYSICIAN ASSISTANT

## 2049-03-08 DIAGNOSIS — N89.8 VAGINAL DISCHARGE: ICD-10-CM

## 2049-03-08 DIAGNOSIS — N89.8 VAGINAL ITCHING: ICD-10-CM
